# Patient Record
Sex: MALE | Race: WHITE | Employment: OTHER | URBAN - METROPOLITAN AREA
[De-identification: names, ages, dates, MRNs, and addresses within clinical notes are randomized per-mention and may not be internally consistent; named-entity substitution may affect disease eponyms.]

---

## 2017-01-05 ENCOUNTER — OFFICE VISIT (OUTPATIENT)
Dept: FAMILY MEDICINE CLINIC | Age: 67
End: 2017-01-05

## 2017-01-05 VITALS
HEIGHT: 68 IN | HEART RATE: 102 BPM | WEIGHT: 183 LBS | SYSTOLIC BLOOD PRESSURE: 116 MMHG | TEMPERATURE: 97.1 F | BODY MASS INDEX: 27.74 KG/M2 | DIASTOLIC BLOOD PRESSURE: 88 MMHG | OXYGEN SATURATION: 98 %

## 2017-01-05 DIAGNOSIS — F32.A DEPRESSION, UNSPECIFIED DEPRESSION TYPE: ICD-10-CM

## 2017-01-05 DIAGNOSIS — R53.83 OTHER FATIGUE: Primary | ICD-10-CM

## 2017-01-05 DIAGNOSIS — Z13.9 SCREENING: ICD-10-CM

## 2017-01-05 DIAGNOSIS — K50.812 CROHN'S DISEASE OF BOTH SMALL AND LARGE INTESTINE WITH INTESTINAL OBSTRUCTION (HCC): ICD-10-CM

## 2017-01-05 PROCEDURE — 99214 OFFICE O/P EST MOD 30 MIN: CPT | Performed by: FAMILY MEDICINE

## 2017-01-05 RX ORDER — SERTRALINE HYDROCHLORIDE 100 MG/1
100 TABLET, FILM COATED ORAL DAILY
Qty: 90 TABLET | Refills: 1 | Status: SHIPPED | OUTPATIENT
Start: 2017-01-05

## 2017-01-05 ASSESSMENT — ENCOUNTER SYMPTOMS
RESPIRATORY NEGATIVE: 1
ABDOMINAL PAIN: 1

## 2017-01-13 LAB
ALBUMIN SERPL-MCNC: 4.5 G/DL
ALP BLD-CCNC: 87 U/L
ALT SERPL-CCNC: 33 U/L
AST SERPL-CCNC: 43 U/L
BASOPHILS ABSOLUTE: 0 /ΜL
BASOPHILS RELATIVE PERCENT: 0.4 %
BILIRUB SERPL-MCNC: 0.5 MG/DL (ref 0.1–1.4)
BUN BLDV-MCNC: 17 MG/DL
CALCIUM SERPL-MCNC: 9 MG/DL
CHLORIDE BLD-SCNC: 103 MMOL/L
CHOLESTEROL, TOTAL: 198 MG/DL
CHOLESTEROL/HDL RATIO: NORMAL
CO2: 29 MMOL/L
CREAT SERPL-MCNC: 1.1 MG/DL
EOSINOPHILS ABSOLUTE: 0.2 /ΜL
EOSINOPHILS RELATIVE PERCENT: 3.1 %
GFR CALCULATED: 69
GLUCOSE BLD-MCNC: 89 MG/DL
HCT VFR BLD CALC: 44 % (ref 41–53)
HDLC SERPL-MCNC: 49 MG/DL (ref 35–70)
HEMOGLOBIN: 14.9 G/DL (ref 13.5–17.5)
LDL CHOLESTEROL CALCULATED: 84 MG/DL (ref 0–160)
LYMPHOCYTES ABSOLUTE: 1.8 /ΜL
LYMPHOCYTES RELATIVE PERCENT: 30.2 %
MCH RBC QN AUTO: 33.8 PG
MCHC RBC AUTO-ENTMCNC: 34 G/DL
MCV RBC AUTO: 99.6 FL
MONOCYTES ABSOLUTE: 0.6 /ΜL
MONOCYTES RELATIVE PERCENT: 9.4 %
NEUTROPHILS ABSOLUTE: 3.4 /ΜL
NEUTROPHILS RELATIVE PERCENT: 56.9 %
PDW BLD-RTO: 14.4 %
PLATELET # BLD: 163 K/ΜL
PMV BLD AUTO: NORMAL FL
POTASSIUM SERPL-SCNC: 4.6 MMOL/L
RBC # BLD: 4.42 10^6/ΜL
SODIUM BLD-SCNC: 142 MMOL/L
T3 TOTAL: 94
T4 TOTAL: 6.9
TOTAL PROTEIN: 6.8
TRIGL SERPL-MCNC: 325 MG/DL
TSH SERPL DL<=0.05 MIU/L-ACNC: 2.6 UIU/ML
VLDLC SERPL CALC-MCNC: 65 MG/DL
WBC # BLD: 6 10^3/ML

## 2017-01-24 DIAGNOSIS — Z13.9 SCREENING: ICD-10-CM

## 2017-01-24 DIAGNOSIS — K50.812 CROHN'S DISEASE OF BOTH SMALL AND LARGE INTESTINE WITH INTESTINAL OBSTRUCTION (HCC): ICD-10-CM

## 2017-01-24 DIAGNOSIS — R53.83 OTHER FATIGUE: ICD-10-CM

## 2017-02-16 ENCOUNTER — HOSPITAL ENCOUNTER (OUTPATIENT)
Dept: INFUSION THERAPY | Age: 67
Discharge: HOME | End: 2017-02-16
Attending: SPECIALIST | Admitting: SPECIALIST

## 2017-02-16 VITALS
TEMPERATURE: 98.8 F | DIASTOLIC BLOOD PRESSURE: 93 MMHG | HEART RATE: 82 BPM | HEIGHT: 68 IN | BODY MASS INDEX: 28.04 KG/M2 | SYSTOLIC BLOOD PRESSURE: 135 MMHG | WEIGHT: 185 LBS | RESPIRATION RATE: 16 BRPM

## 2017-02-16 DIAGNOSIS — K50.814 CROHN'S DISEASE OF BOTH SMALL AND LARGE INTESTINE WITH ABSCESS (HCC): ICD-10-CM

## 2017-02-16 RX ORDER — 0.9 % SODIUM CHLORIDE 0.9 %
10 VIAL (ML) INJECTION PRN
Status: CANCELLED
Start: 2017-02-16

## 2017-02-16 RX ORDER — 0.9 % SODIUM CHLORIDE 0.9 %
10 VIAL (ML) INJECTION PRN
Status: ACTIVE | OUTPATIENT
Start: 2017-02-16 | End: 2017-02-16

## 2017-02-16 RX ADMIN — Medication 10 ML: at 10:05

## 2017-02-16 RX ADMIN — Medication 10 ML: at 11:40

## 2017-04-13 ENCOUNTER — HOSPITAL ENCOUNTER (OUTPATIENT)
Dept: INFUSION THERAPY | Age: 67
Discharge: HOME | End: 2017-04-13
Attending: SPECIALIST | Admitting: SPECIALIST

## 2017-04-13 VITALS — HEART RATE: 76 BPM | RESPIRATION RATE: 18 BRPM | SYSTOLIC BLOOD PRESSURE: 145 MMHG | DIASTOLIC BLOOD PRESSURE: 86 MMHG

## 2017-04-13 DIAGNOSIS — K50.814 CROHN'S DISEASE OF BOTH SMALL AND LARGE INTESTINE WITH ABSCESS (HCC): ICD-10-CM

## 2017-04-13 RX ORDER — 0.9 % SODIUM CHLORIDE 0.9 %
10 VIAL (ML) INJECTION PRN
Status: DISCONTINUED | OUTPATIENT
Start: 2017-04-13 | End: 2017-04-14 | Stop reason: HOSPADM

## 2017-04-13 RX ORDER — 0.9 % SODIUM CHLORIDE 0.9 %
10 VIAL (ML) INJECTION PRN
Status: CANCELLED
Start: 2017-04-13

## 2017-04-13 RX ADMIN — Medication 10 ML: at 12:06

## 2017-04-13 RX ADMIN — Medication 10 ML: at 11:25

## 2017-06-08 ENCOUNTER — HOSPITAL ENCOUNTER (OUTPATIENT)
Dept: INFUSION THERAPY | Age: 67
Discharge: HOME | End: 2017-06-08
Attending: SPECIALIST | Admitting: SPECIALIST

## 2017-06-08 VITALS
HEART RATE: 75 BPM | RESPIRATION RATE: 18 BRPM | DIASTOLIC BLOOD PRESSURE: 82 MMHG | OXYGEN SATURATION: 97 % | SYSTOLIC BLOOD PRESSURE: 126 MMHG | TEMPERATURE: 98.1 F

## 2017-06-08 DIAGNOSIS — K50.814 CROHN'S DISEASE OF BOTH SMALL AND LARGE INTESTINE WITH ABSCESS (HCC): ICD-10-CM

## 2017-06-08 RX ORDER — 0.9 % SODIUM CHLORIDE 0.9 %
10 VIAL (ML) INJECTION PRN
Status: CANCELLED
Start: 2017-06-08

## 2017-06-08 RX ORDER — 0.9 % SODIUM CHLORIDE 0.9 %
10 VIAL (ML) INJECTION PRN
Status: DISCONTINUED | OUTPATIENT
Start: 2017-06-08 | End: 2017-06-09 | Stop reason: HOSPADM

## 2017-06-08 RX ADMIN — Medication 10 ML: at 10:58

## 2017-06-12 PROBLEM — K50.80 CROHN'S DISEASE OF BOTH SMALL AND LARGE INTESTINE WITHOUT COMPLICATION (HCC): Status: ACTIVE | Noted: 2017-06-12

## 2017-08-03 ENCOUNTER — HOSPITAL ENCOUNTER (OUTPATIENT)
Dept: INFUSION THERAPY | Age: 67
Discharge: HOME | End: 2017-08-03
Attending: SPECIALIST | Admitting: SPECIALIST

## 2017-08-03 VITALS
DIASTOLIC BLOOD PRESSURE: 78 MMHG | RESPIRATION RATE: 16 BRPM | TEMPERATURE: 98 F | HEART RATE: 72 BPM | SYSTOLIC BLOOD PRESSURE: 136 MMHG

## 2017-08-03 RX ORDER — SODIUM CHLORIDE 0.9 % (FLUSH) 0.9 %
10 SYRINGE (ML) INJECTION PRN
Status: DISCONTINUED | OUTPATIENT
Start: 2017-08-03 | End: 2017-08-04 | Stop reason: HOSPADM

## 2017-08-03 RX ADMIN — Medication 10 ML: at 11:09

## 2017-08-03 RX ADMIN — Medication 10 ML: at 10:28

## 2017-09-28 ENCOUNTER — HOSPITAL ENCOUNTER (OUTPATIENT)
Dept: INFUSION THERAPY | Age: 67
Discharge: HOME | End: 2017-09-28
Attending: SPECIALIST | Admitting: SPECIALIST

## 2017-09-28 VITALS — WEIGHT: 187 LBS | BODY MASS INDEX: 28.43 KG/M2

## 2017-09-28 RX ORDER — SODIUM CHLORIDE 0.9 % (FLUSH) 0.9 %
10 SYRINGE (ML) INJECTION PRN
Status: DISCONTINUED | OUTPATIENT
Start: 2017-09-28 | End: 2017-09-29 | Stop reason: HOSPADM

## 2017-09-28 RX ADMIN — Medication 10 ML: at 11:15

## 2017-09-28 RX ADMIN — Medication 10 ML: at 10:20

## 2017-09-28 ASSESSMENT — PAIN SCALES - GENERAL: PAINLEVEL_OUTOF10: 0

## 2017-11-22 ENCOUNTER — HOSPITAL ENCOUNTER (OUTPATIENT)
Dept: INFUSION THERAPY | Age: 67
Discharge: HOME | End: 2017-11-22
Attending: SPECIALIST | Admitting: SPECIALIST

## 2017-11-22 VITALS
HEIGHT: 67 IN | DIASTOLIC BLOOD PRESSURE: 79 MMHG | HEART RATE: 60 BPM | WEIGHT: 186 LBS | RESPIRATION RATE: 16 BRPM | SYSTOLIC BLOOD PRESSURE: 150 MMHG | BODY MASS INDEX: 29.19 KG/M2 | TEMPERATURE: 98.2 F

## 2017-11-22 RX ORDER — SODIUM CHLORIDE 0.9 % (FLUSH) 0.9 %
10 SYRINGE (ML) INJECTION PRN
Status: DISCONTINUED | OUTPATIENT
Start: 2017-11-22 | End: 2017-11-23 | Stop reason: HOSPADM

## 2017-11-22 RX ADMIN — Medication 10 ML: at 10:40

## 2017-11-22 RX ADMIN — Medication 10 ML: at 09:55

## 2017-11-22 NOTE — DISCHARGE SUMMARY
Tolerated Entyvio well. Discharge instructions explained written copy given. Understanding verbalized. Discharged home. Down to private auto per self.

## 2017-11-22 NOTE — PLAN OF CARE
Ambulatory to unit room 6 for Entyvio. Orientated to unit. Procedure and plan of care explained. Questions answered. Understanding verbalized.

## 2018-01-24 ENCOUNTER — HOSPITAL ENCOUNTER (OUTPATIENT)
Dept: INFUSION THERAPY | Age: 68
Discharge: HOME | End: 2018-01-24
Attending: SPECIALIST | Admitting: SPECIALIST

## 2018-01-24 VITALS
HEART RATE: 69 BPM | RESPIRATION RATE: 16 BRPM | SYSTOLIC BLOOD PRESSURE: 138 MMHG | TEMPERATURE: 98.5 F | DIASTOLIC BLOOD PRESSURE: 84 MMHG

## 2018-01-24 RX ORDER — SODIUM CHLORIDE 0.9 % (FLUSH) 0.9 %
10 SYRINGE (ML) INJECTION PRN
Status: DISCONTINUED | OUTPATIENT
Start: 2018-01-24 | End: 2018-01-25 | Stop reason: HOSPADM

## 2018-01-24 RX ADMIN — Medication 10 ML: at 11:05

## 2018-01-24 RX ADMIN — Medication 10 ML: at 10:10

## 2018-01-24 ASSESSMENT — PAIN SCALES - GENERAL: PAINLEVEL_OUTOF10: 0

## 2018-03-21 ENCOUNTER — HOSPITAL ENCOUNTER (OUTPATIENT)
Dept: INFUSION THERAPY | Age: 68
Discharge: HOME | End: 2018-03-21
Attending: SPECIALIST | Admitting: SPECIALIST

## 2018-03-21 VITALS
OXYGEN SATURATION: 97 % | WEIGHT: 189 LBS | BODY MASS INDEX: 29.66 KG/M2 | DIASTOLIC BLOOD PRESSURE: 83 MMHG | TEMPERATURE: 98.4 F | SYSTOLIC BLOOD PRESSURE: 136 MMHG | HEART RATE: 74 BPM | RESPIRATION RATE: 16 BRPM | HEIGHT: 67 IN

## 2018-03-21 RX ORDER — 0.9 % SODIUM CHLORIDE 0.9 %
10 VIAL (ML) INJECTION PRN
Status: DISCONTINUED | OUTPATIENT
Start: 2018-03-21 | End: 2018-03-22 | Stop reason: HOSPADM

## 2018-03-21 RX ADMIN — Medication 10 ML: at 10:40

## 2018-03-21 RX ADMIN — Medication 10 ML: at 09:55

## 2018-05-16 ENCOUNTER — HOSPITAL ENCOUNTER (OUTPATIENT)
Dept: INFUSION THERAPY | Age: 68
Discharge: HOME | End: 2018-05-16
Attending: SPECIALIST | Admitting: SPECIALIST

## 2018-05-16 VITALS
SYSTOLIC BLOOD PRESSURE: 134 MMHG | HEART RATE: 62 BPM | TEMPERATURE: 98 F | RESPIRATION RATE: 16 BRPM | DIASTOLIC BLOOD PRESSURE: 77 MMHG

## 2018-05-16 RX ORDER — SODIUM CHLORIDE 0.9 % (FLUSH) 0.9 %
10 SYRINGE (ML) INJECTION PRN
Status: DISCONTINUED | OUTPATIENT
Start: 2018-05-16 | End: 2018-05-17 | Stop reason: HOSPADM

## 2018-05-16 RX ADMIN — Medication 10 ML: at 09:55

## 2018-05-16 RX ADMIN — Medication 10 ML: at 10:55

## 2018-07-11 ENCOUNTER — HOSPITAL ENCOUNTER (OUTPATIENT)
Dept: INFUSION THERAPY | Age: 68
Discharge: HOME | End: 2018-07-11
Attending: SPECIALIST | Admitting: SPECIALIST

## 2018-07-11 VITALS
TEMPERATURE: 98.3 F | HEART RATE: 83 BPM | RESPIRATION RATE: 20 BRPM | SYSTOLIC BLOOD PRESSURE: 123 MMHG | DIASTOLIC BLOOD PRESSURE: 82 MMHG

## 2018-07-11 RX ORDER — SODIUM CHLORIDE 0.9 % (FLUSH) 0.9 %
10 SYRINGE (ML) INJECTION PRN
Status: DISCONTINUED | OUTPATIENT
Start: 2018-07-11 | End: 2018-07-12 | Stop reason: HOSPADM

## 2018-07-11 RX ADMIN — Medication 10 ML: at 11:20

## 2018-07-11 RX ADMIN — Medication 10 ML: at 10:00

## 2018-07-11 ASSESSMENT — PAIN SCALES - GENERAL: PAINLEVEL_OUTOF10: 0

## 2018-09-05 ENCOUNTER — HOSPITAL ENCOUNTER (OUTPATIENT)
Dept: INFUSION THERAPY | Age: 68
Discharge: HOME | End: 2018-09-05
Attending: SPECIALIST | Admitting: SPECIALIST

## 2018-09-05 VITALS
SYSTOLIC BLOOD PRESSURE: 134 MMHG | HEART RATE: 67 BPM | DIASTOLIC BLOOD PRESSURE: 87 MMHG | RESPIRATION RATE: 18 BRPM | TEMPERATURE: 97.4 F

## 2018-09-05 RX ORDER — SERTRALINE HYDROCHLORIDE 100 MG/1
100 TABLET, FILM COATED ORAL
COMMUNITY
End: 2018-09-05

## 2018-09-05 RX ORDER — FOLIC ACID 1 MG/1
1 TABLET ORAL
COMMUNITY
Start: 2016-05-02 | End: 2018-09-05

## 2018-09-05 RX ORDER — DIPHENOXYLATE HYDROCHLORIDE AND ATROPINE SULFATE 2.5; .025 MG/1; MG/1
2 TABLET ORAL
COMMUNITY
End: 2018-09-05

## 2018-09-05 ASSESSMENT — PAIN SCALES - GENERAL: PAINLEVEL_OUTOF10: 0

## 2018-09-06 NOTE — PROGRESS NOTES
Pt taken to room 00 for entyvio infusion. Pt oriented to room, call light, bed/chair controls, TV, pt voiced understanding. Plan of care explained to pt, pt voiced understanding.

## 2018-10-31 ENCOUNTER — HOSPITAL ENCOUNTER (OUTPATIENT)
Dept: INFUSION THERAPY | Age: 68
Setting detail: INFUSION SERIES
Discharge: HOME | End: 2018-10-31
Payer: MEDICARE

## 2018-10-31 VITALS
SYSTOLIC BLOOD PRESSURE: 150 MMHG | HEART RATE: 86 BPM | DIASTOLIC BLOOD PRESSURE: 86 MMHG | OXYGEN SATURATION: 98 % | TEMPERATURE: 98.9 F | RESPIRATION RATE: 16 BRPM

## 2018-10-31 PROCEDURE — 96365 THER/PROPH/DIAG IV INF INIT: CPT

## 2018-10-31 NOTE — PROGRESS NOTES
Pt taken to room 1 . Pt oriented to room, call light, bed/chair controls, TV, pt voiced understanding. Plan of care explained to pt, pt voiced understanding.      Pt information packet about Entyvio given to pt

## 2018-10-31 NOTE — PROGRESS NOTES
Reviewed discharge instructions with pt, voiced understanding. Pt ambulated to exit with steady gait.

## 2018-12-19 ENCOUNTER — HOSPITAL ENCOUNTER (OUTPATIENT)
Dept: INFUSION THERAPY | Age: 68
End: 2018-12-19

## 2018-12-27 ENCOUNTER — HOSPITAL ENCOUNTER (OUTPATIENT)
Dept: INFUSION THERAPY | Age: 68
Setting detail: INFUSION SERIES
Discharge: HOME | End: 2018-12-27
Payer: MEDICARE

## 2018-12-27 VITALS
WEIGHT: 175 LBS | BODY MASS INDEX: 27.47 KG/M2 | RESPIRATION RATE: 14 BRPM | TEMPERATURE: 98.4 F | DIASTOLIC BLOOD PRESSURE: 85 MMHG | HEIGHT: 67 IN | HEART RATE: 87 BPM | OXYGEN SATURATION: 96 % | SYSTOLIC BLOOD PRESSURE: 139 MMHG

## 2018-12-27 PROCEDURE — 96365 THER/PROPH/DIAG IV INF INIT: CPT

## 2018-12-27 PROCEDURE — 96360 HYDRATION IV INFUSION INIT: CPT

## 2018-12-27 PROCEDURE — 99211 OFF/OP EST MAY X REQ PHY/QHP: CPT

## 2018-12-27 RX ORDER — 0.9 % SODIUM CHLORIDE 0.9 %
10 VIAL (ML) INJECTION PRN
Status: DISCONTINUED | OUTPATIENT
Start: 2018-12-27 | End: 2018-12-28 | Stop reason: HOSPADM

## 2019-02-21 ENCOUNTER — HOSPITAL ENCOUNTER (OUTPATIENT)
Dept: INFUSION THERAPY | Age: 69
Setting detail: INFUSION SERIES
Discharge: HOME | End: 2019-02-21
Payer: MEDICARE

## 2019-02-21 VITALS
DIASTOLIC BLOOD PRESSURE: 89 MMHG | SYSTOLIC BLOOD PRESSURE: 126 MMHG | HEART RATE: 86 BPM | HEIGHT: 68 IN | WEIGHT: 172 LBS | TEMPERATURE: 98.6 F | OXYGEN SATURATION: 96 % | BODY MASS INDEX: 26.07 KG/M2 | RESPIRATION RATE: 15 BRPM

## 2019-02-21 PROCEDURE — 96365 THER/PROPH/DIAG IV INF INIT: CPT

## 2019-02-21 PROCEDURE — 2580000003 HC RX 258: Performed by: SPECIALIST

## 2019-02-21 PROCEDURE — 99211 OFF/OP EST MAY X REQ PHY/QHP: CPT

## 2019-02-21 RX ORDER — 0.9 % SODIUM CHLORIDE 0.9 %
10 VIAL (ML) INJECTION PRN
Status: DISCONTINUED | OUTPATIENT
Start: 2019-02-21 | End: 2019-02-22 | Stop reason: HOSPADM

## 2019-02-21 RX ADMIN — SODIUM CHLORIDE, PRESERVATIVE FREE 10 ML: 5 INJECTION INTRAVENOUS at 11:00

## 2019-02-21 NOTE — PLAN OF CARE
Ambulatory to unit room 1 for Entyvio. Reorientated to unit. Procedure and plan of care explained. Questions answered. Understanding verbalized.

## 2019-04-18 ENCOUNTER — HOSPITAL ENCOUNTER (OUTPATIENT)
Dept: INFUSION THERAPY | Age: 69
Setting detail: INFUSION SERIES
Discharge: HOME | End: 2019-04-18
Payer: MEDICARE

## 2019-04-18 VITALS
SYSTOLIC BLOOD PRESSURE: 129 MMHG | BODY MASS INDEX: 25.76 KG/M2 | HEART RATE: 72 BPM | TEMPERATURE: 97.1 F | HEIGHT: 68 IN | RESPIRATION RATE: 14 BRPM | WEIGHT: 170 LBS | DIASTOLIC BLOOD PRESSURE: 78 MMHG

## 2019-04-18 PROCEDURE — 2580000003 HC RX 258

## 2019-04-18 PROCEDURE — 99211 OFF/OP EST MAY X REQ PHY/QHP: CPT

## 2019-04-18 PROCEDURE — 2580000003 HC RX 258: Performed by: SPECIALIST

## 2019-04-18 PROCEDURE — 96365 THER/PROPH/DIAG IV INF INIT: CPT

## 2019-04-18 RX ORDER — 0.9 % SODIUM CHLORIDE 0.9 %
10 VIAL (ML) INJECTION PRN
Status: DISCONTINUED | OUTPATIENT
Start: 2019-04-18 | End: 2019-04-19 | Stop reason: HOSPADM

## 2019-04-18 RX ADMIN — SODIUM CHLORIDE, PRESERVATIVE FREE 10 ML: 5 INJECTION INTRAVENOUS at 10:30

## 2019-04-18 RX ADMIN — SODIUM CHLORIDE, PRESERVATIVE FREE 10 ML: 5 INJECTION INTRAVENOUS at 09:45

## 2019-04-18 NOTE — DISCHARGE SUMMARY
Tolerated Entyvio  well. Discharge instructions explained written copy given. Understanding verbalized. Discharged Down to private auto per self.

## 2019-05-14 ENCOUNTER — HOSPITAL ENCOUNTER (OUTPATIENT)
Dept: SURGERY | Age: 69
Discharge: HOME | End: 2019-05-14
Payer: MEDICARE

## 2019-05-14 ENCOUNTER — HOSPITAL ENCOUNTER (OUTPATIENT)
Dept: OPERATING ROOM | Age: 69
Setting detail: OUTPATIENT SURGERY
Discharge: HOME | End: 2019-05-15

## 2019-05-14 PROCEDURE — 91200 LIVER ELASTOGRAPHY: CPT

## 2019-05-17 NOTE — PROCEDURES
FIBROSCAN  REPORT     Patient Name: Samanta Davis  : 1950    Date: 19      MRN: [id number]    Physician: No att. providers found    Ordering Physician: Everrett Kehr  Date of exam: 19      Technician: wa                  Probe used:    [] M   [x] XL      Indication: (select suspected liver diagnosis)   [] NAFLD  (K76.0)  [] Chronic Hepatitis C (B18.2) [] Chronic Hepatitis B (B18.1)   [] Alcohol-related Liver Disease (K70.9)  [] PBC (K74.3)  [] Sclerosing Cholangitis (K83.0)   [] Other:______________________    Recent labs: AST 17 ALT 12 Bilirubin N  Alk. Phosphatase N  Platelet Count  405A  Date of labs:18        Procedure: After providing oral explanation of the Fibroscan VCTE test procedure to the patient, the patient was placed in the supine position with the right arm in maximum abduction to allow exposure of the right lateral abdomen. The patient was briefly assessed, identifying the terminus of the xiphoid process and locating an ideal testing site, mid-line and lateral to this point. The patient was instructed to breath normally and remain stationary during the testing process. Pre-measurement data confirmed that the probe was centered over the liver parenchyma. A series of at least ten 50 Hz mechanical pulses were applied with controlled application pressure to induce a mechanical shear wave in the liver tissue. For each measurement, the shear wave propagation speed was detected, displayed and converted to its equivalent liver stiffness value measured in kilopascals (kPa). Skin-to-liver capsule distance and shear wave characteristics were monitored during the entire exam to assure data quality. Median liver stiffness measurement and interquartile range were calculated and displayed in real time. Acquired measurement data was stored and submitted to the provider for review and interpretation.  The patient tolerated the procedure well and was discharged without incident. FINDINGS:  A. FIBROSIS ASSSESSMENT:   MEDIAN LIVER STIFFNESS SCORE____4.8___________kPa   INTERQUARTILE RANGE (IQR) /MEDIAN ____15_____%   INTERPRETATION: Taking into account the patient's history, this liver stiffness     score is consistent with:        [x]  NO OR MINIMAL FIBROSIS (F0-F1)                   []  MODERATE FIBROSIS (F2)                   []  ADVANCED / SIGNIFICANT FIBROSIS (F3)                   []  CIRRHOSIS (F4)  B. LIVER FAT ESTIMATION:       MEDIAN CAP (controlled attenuation parameter)_258________ dB/m  IRQ of  __53___ % INTERPRETATION: Taking into account the patient's history, this CAP score is     consistent with:        [] NO STEATOSIS (S0)        [x] MINIMAL-MILD STEATOSIS (S0-S1)        [] MILD- MODERATE STEATOSIS (S1-S2)          [] SIGNIFICANT STEATOSIS (S3)   COMMENTS: THE ABOVE FINDINGS SUGGEST MINIMAL TO MILD STEATOSIS. THE CALCULATED FIB-4 INDEX OF 2.01 SUGGESTS F2-F3 FIBROSIS HOWEVER THE FIBROSCAN SUGGESTS NO SIGNIFICANT FIBROSIS      SIGNATURE OF INTERPRETING PROVIDER: Electronically signed by Luz Delgado MD on 5/17/2019 at 10:17 AM    My interpretation of this Vibration Controlled Transient Elastogtaphy (VCTE) was developed after careful consideration of the patient's current medical evidence. Any and all Fibroscan studies must be carefully evaluated, taking fully into account all individual measurements/scans, patient history, and other factors. Any further medical or surgical intervention should be made only while fully considering the circumstances of this patient, in consultation with this patient, fully informed by the product characteristics of any drugs or treatment protocols.

## 2019-06-13 ENCOUNTER — HOSPITAL ENCOUNTER (OUTPATIENT)
Dept: INFUSION THERAPY | Age: 69
Setting detail: INFUSION SERIES
Discharge: HOME | End: 2019-06-13
Payer: MEDICARE

## 2019-06-13 VITALS
TEMPERATURE: 98.2 F | SYSTOLIC BLOOD PRESSURE: 138 MMHG | RESPIRATION RATE: 12 BRPM | DIASTOLIC BLOOD PRESSURE: 71 MMHG | HEART RATE: 70 BPM

## 2019-06-13 PROCEDURE — 99211 OFF/OP EST MAY X REQ PHY/QHP: CPT

## 2019-06-13 PROCEDURE — 2580000003 HC RX 258: Performed by: SPECIALIST

## 2019-06-13 PROCEDURE — 96365 THER/PROPH/DIAG IV INF INIT: CPT

## 2019-06-13 RX ORDER — 0.9 % SODIUM CHLORIDE 0.9 %
10 VIAL (ML) INJECTION PRN
Status: DISCONTINUED | OUTPATIENT
Start: 2019-06-13 | End: 2019-06-14 | Stop reason: HOSPADM

## 2019-06-13 RX ADMIN — SODIUM CHLORIDE, PRESERVATIVE FREE 10 ML: 5 INJECTION INTRAVENOUS at 10:05

## 2019-06-13 RX ADMIN — SODIUM CHLORIDE, PRESERVATIVE FREE 10 ML: 5 INJECTION INTRAVENOUS at 10:45

## 2019-06-13 NOTE — PLAN OF CARE
Ambulatory to unit room 6 for Entyvio. Reorientated to unit. Procedure and plan of care explained. Questions answered. Understanding verbalized.

## 2019-06-13 NOTE — DISCHARGE SUMMARY
Tolerated etyvio well. Discharge instructions explained written copy given. Understanding verbalized. Discharged home. Down to private auto per self.

## 2019-06-25 LAB
A/G RATIO: 1.8 (CALC) (ref 0.8–2.6)
ALBUMIN SERPL-MCNC: 4.5 GM/DL (ref 3.5–5.2)
ALP BLD-CCNC: 89 U/L (ref 23–144)
ALT SERPL-CCNC: 13 U/L (ref 0–60)
AST SERPL-CCNC: 19 U/L (ref 0–55)
BASOPHILS ABSOLUTE: 0 K/MM3 (ref 0–0.3)
BASOPHILS RELATIVE PERCENT: 0.7 % (ref 0–2)
BILIRUB SERPL-MCNC: 0.4 MG/DL (ref 0–1.2)
BUN / CREAT RATIO: 13 (CALC) (ref 7–25)
BUN BLDV-MCNC: 15 MG/DL (ref 3–29)
C-REACTIVE PROTEIN: <0.3 MG/DL
CALCIUM SERPL-MCNC: 9.8 MG/DL (ref 8.5–10.5)
CHLORIDE BLD-SCNC: 103 MEQ/L (ref 96–110)
CO2: 27 MEQ/L (ref 19–32)
CREAT SERPL-MCNC: 1.2 MG/DL
EOSINOPHILS ABSOLUTE: 0.2 K/MM3 (ref 0–0.6)
EOSINOPHILS RELATIVE PERCENT: 2.4 % (ref 0–7)
GFR SERPL CREATININE-BSD FRML MDRD: 61 ML/MIN/1.73M2
GLOBULIN: 2.5 GM/DL (CALC) (ref 1.9–3.6)
GLUCOSE BLD-MCNC: 81 MG/DL
HCT VFR BLD CALC: 44.3 % (ref 41–50)
HEMOGLOBIN: 15.7 G/DL (ref 13.8–17.2)
LEUKOCYTES, UA: 6.7 K/MM3 (ref 3.8–10.8)
LYMPHOCYTES ABSOLUTE: 1.9 K/MM3 (ref 0.9–4.1)
LYMPHOCYTES RELATIVE PERCENT: 29.1 % (ref 14–51)
MCH RBC QN AUTO: 34.5 PG (ref 27–33)
MCHC RBC AUTO-ENTMCNC: 35.4 G/DL (ref 32–36)
MCV RBC AUTO: 97.6 FL (ref 80–100)
MONOCYTES ABSOLUTE: 0.6 K/MM3 (ref 0.2–1.1)
MONOCYTES RELATIVE PERCENT: 9.2 % (ref 0–14)
NEUTROPHILS ABSOLUTE: 3.9 K/MM3 (ref 1.5–7.8)
PDW BLD-RTO: 12.8 % (ref 9–15)
PLATELET # BLD: 184 K/MM3 (ref 130–400)
POTASSIUM SERPL-SCNC: 4.7 MEQ/L (ref 3.4–5.3)
RBC # BLD: 4.54 M/MM3 (ref 4.4–5.8)
SEGMENTED NEUTROPHILS RELATIVE PERCENT: 58.6 % (ref 40–76)
SODIUM BLD-SCNC: 142 MEQ/L (ref 135–148)
TOTAL PROTEIN: 7 GM/DL (ref 6–8.3)

## 2019-06-26 LAB — COPY(IES) SENT TO:: NORMAL

## 2019-08-08 ENCOUNTER — HOSPITAL ENCOUNTER (OUTPATIENT)
Dept: INFUSION THERAPY | Age: 69
Setting detail: INFUSION SERIES
Discharge: HOME | End: 2019-08-08
Payer: MEDICARE

## 2019-08-08 VITALS
SYSTOLIC BLOOD PRESSURE: 130 MMHG | RESPIRATION RATE: 12 BRPM | HEIGHT: 68 IN | HEART RATE: 76 BPM | DIASTOLIC BLOOD PRESSURE: 77 MMHG | BODY MASS INDEX: 25.76 KG/M2 | TEMPERATURE: 98 F | WEIGHT: 170 LBS | OXYGEN SATURATION: 97 %

## 2019-08-08 PROCEDURE — 2580000003 HC RX 258: Performed by: SPECIALIST

## 2019-08-08 PROCEDURE — 96365 THER/PROPH/DIAG IV INF INIT: CPT

## 2019-08-08 PROCEDURE — 99211 OFF/OP EST MAY X REQ PHY/QHP: CPT

## 2019-08-08 RX ORDER — 0.9 % SODIUM CHLORIDE 0.9 %
10 VIAL (ML) INJECTION PRN
Status: DISCONTINUED | OUTPATIENT
Start: 2019-08-08 | End: 2019-08-09 | Stop reason: HOSPADM

## 2019-08-08 RX ADMIN — Medication 10 ML: at 10:50

## 2019-08-08 RX ADMIN — Medication 10 ML: at 10:00

## 2019-08-08 NOTE — PLAN OF CARE
Ambulatory to unit room 3 for Entyvio. Reorientated to unit. Procedure and plan of care explained. Questions answered. Understanding verbalized.

## 2019-08-13 ENCOUNTER — HOSPITAL ENCOUNTER (OUTPATIENT)
Age: 69
Discharge: HOME | End: 2019-08-13
Payer: MEDICARE

## 2019-08-13 LAB
C-REACTIVE PROTEIN, HIGH SENSITIVITY: 2.7 MG/L
HCT VFR BLD CALC: 42.7 % (ref 42–52)
HEMOGLOBIN: 14.1 GM/DL (ref 13.5–18)
MCH RBC QN AUTO: 33.6 PG (ref 27–31)
MCHC RBC AUTO-ENTMCNC: 33 % (ref 32–36)
MCV RBC AUTO: 101.7 FL (ref 78–100)
PDW BLD-RTO: 12.3 % (ref 11.7–14.9)
PLATELET # BLD: 167 K/CU MM (ref 140–440)
PMV BLD AUTO: 11.3 FL (ref 7.5–11.1)
RBC # BLD: 4.2 M/CU MM (ref 4.6–6.2)
WBC # BLD: 6.8 K/CU MM (ref 4–10.5)

## 2019-08-13 PROCEDURE — 83993 ASSAY FOR CALPROTECTIN FECAL: CPT

## 2019-08-13 PROCEDURE — 86140 C-REACTIVE PROTEIN: CPT

## 2019-08-13 PROCEDURE — 36415 COLL VENOUS BLD VENIPUNCTURE: CPT

## 2019-08-13 PROCEDURE — 85027 COMPLETE CBC AUTOMATED: CPT

## 2019-08-16 LAB
CALPROTECTIN, FECAL: 28 UG/G
CALPROTECTIN, FECAL: NORMAL UG/G

## 2019-10-03 ENCOUNTER — HOSPITAL ENCOUNTER (OUTPATIENT)
Dept: INFUSION THERAPY | Age: 69
Setting detail: INFUSION SERIES
Discharge: HOME | End: 2019-10-03
Payer: MEDICARE

## 2019-10-03 VITALS
DIASTOLIC BLOOD PRESSURE: 85 MMHG | SYSTOLIC BLOOD PRESSURE: 135 MMHG | HEART RATE: 71 BPM | TEMPERATURE: 98.3 F | OXYGEN SATURATION: 97 % | RESPIRATION RATE: 16 BRPM

## 2019-10-03 PROCEDURE — 96365 THER/PROPH/DIAG IV INF INIT: CPT

## 2019-10-03 PROCEDURE — 99211 OFF/OP EST MAY X REQ PHY/QHP: CPT

## 2019-10-03 PROCEDURE — 2580000003 HC RX 258: Performed by: SPECIALIST

## 2019-10-03 RX ORDER — SODIUM CHLORIDE 0.9 % (FLUSH) 0.9 %
10 SYRINGE (ML) INJECTION PRN
Status: DISCONTINUED | OUTPATIENT
Start: 2019-10-03 | End: 2019-10-04 | Stop reason: HOSPADM

## 2019-10-03 RX ADMIN — Medication 10 ML: at 10:55

## 2019-10-03 RX ADMIN — Medication 10 ML: at 09:32

## 2019-10-03 NOTE — DISCHARGE SUMMARY
Tolerated INFUSION WELL well. Discharge instructions explained written copy given. Understanding verbalized. Discharged home. Down to private auto per self.

## 2019-10-17 LAB
BASOPHILS ABSOLUTE: 0 K/MM3 (ref 0–0.3)
BASOPHILS RELATIVE PERCENT: 0.7 % (ref 0–2)
C-REACTIVE PROTEIN: 0.4 MG/DL
EOSINOPHILS ABSOLUTE: 0.2 K/MM3 (ref 0–0.6)
EOSINOPHILS RELATIVE PERCENT: 3.4 % (ref 0–7)
HCT VFR BLD CALC: 42.5 % (ref 41–50)
HEMOGLOBIN: 14.6 G/DL (ref 13.8–17.2)
LEUKOCYTES, UA: 6 K/MM3 (ref 3.8–10.8)
LYMPHOCYTES ABSOLUTE: 1.9 K/MM3 (ref 0.9–4.1)
LYMPHOCYTES RELATIVE PERCENT: 31.1 % (ref 14–51)
MCH RBC QN AUTO: 34.2 PG (ref 27–33)
MCHC RBC AUTO-ENTMCNC: 34.3 G/DL (ref 32–36)
MCV RBC AUTO: 99.5 FL (ref 80–100)
MONOCYTES ABSOLUTE: 0.7 K/MM3 (ref 0.2–1.1)
MONOCYTES RELATIVE PERCENT: 11.4 % (ref 0–14)
NEUTROPHILS ABSOLUTE: 3.2 K/MM3 (ref 1.5–7.8)
PDW BLD-RTO: 12.8 % (ref 9–15)
PLATELET # BLD: 181 K/MM3 (ref 130–400)
RBC # BLD: 4.27 M/MM3 (ref 4.4–5.8)
SEGMENTED NEUTROPHILS RELATIVE PERCENT: 53.4 % (ref 40–76)

## 2019-10-21 LAB
A/G RATIO: 1.7 (CALC) (ref 0.8–2.6)
ALBUMIN SERPL-MCNC: 4.3 GM/DL (ref 3.5–5.2)
ALP BLD-CCNC: 87 U/L (ref 23–144)
ALT SERPL-CCNC: 11 U/L (ref 0–60)
AST SERPL-CCNC: 24 (ref 0–55)
BILIRUB SERPL-MCNC: 0.3 MG/DL (ref 0–1.2)
BUN / CREAT RATIO: 13 (CALC) (ref 7–25)
BUN BLDV-MCNC: 16 MG/DL (ref 3–29)
CALCIUM SERPL-MCNC: 9.4 MG/DL (ref 8.5–10.5)
CHLORIDE BLD-SCNC: 107 MEQ/L (ref 96–110)
CO2: 25 MEQ/L (ref 19–32)
COPY(IES) SENT TO:: NORMAL
CREAT SERPL-MCNC: 1.2 MG/DL
GFR SERPL CREATININE-BSD FRML MDRD: 60 ML/MIN/1.73M2
GLOBULIN: 2.5 GM/DL (CALC) (ref 1.9–3.6)
GLUCOSE BLD-MCNC: 91 MG/DL
POTASSIUM SERPL-SCNC: 4.9 MEQ/L (ref 3.4–5.3)
SODIUM BLD-SCNC: 144 MEQ/L (ref 135–148)
TOTAL PROTEIN: 6.8 GM/DL (ref 6–8.3)

## 2019-11-27 ENCOUNTER — HOSPITAL ENCOUNTER (OUTPATIENT)
Dept: INFUSION THERAPY | Age: 69
Setting detail: INFUSION SERIES
Discharge: HOME | End: 2019-11-27
Payer: MEDICARE

## 2019-11-27 VITALS
HEART RATE: 68 BPM | TEMPERATURE: 98.9 F | DIASTOLIC BLOOD PRESSURE: 74 MMHG | RESPIRATION RATE: 14 BRPM | SYSTOLIC BLOOD PRESSURE: 117 MMHG | OXYGEN SATURATION: 96 %

## 2019-11-27 PROCEDURE — 99211 OFF/OP EST MAY X REQ PHY/QHP: CPT

## 2019-11-27 PROCEDURE — 96365 THER/PROPH/DIAG IV INF INIT: CPT

## 2019-11-27 NOTE — PROGRESS NOTES
Reviewed discharge instructions, voiced understanding, and copies of AVS given to take home. Pt tolerated infusion well. Pt to exit via steady gait.     Orders Placed This Encounter   Medications    DISCONTD: vedolizumab (ENTYVIO) 300 mg in sodium chloride 0.9 % 250 mL infusion    vedolizumab (ENTYVIO) 300 mg in sodium chloride 0.9 % 250 mL infusion

## 2020-01-22 ENCOUNTER — HOSPITAL ENCOUNTER (OUTPATIENT)
Dept: INFUSION THERAPY | Age: 70
Setting detail: INFUSION SERIES
Discharge: HOME | End: 2020-01-22
Payer: MEDICARE

## 2020-01-22 VITALS
RESPIRATION RATE: 16 BRPM | HEART RATE: 90 BPM | DIASTOLIC BLOOD PRESSURE: 80 MMHG | HEIGHT: 67 IN | SYSTOLIC BLOOD PRESSURE: 124 MMHG | TEMPERATURE: 98.2 F | WEIGHT: 172 LBS | OXYGEN SATURATION: 98 % | BODY MASS INDEX: 27 KG/M2

## 2020-01-22 PROCEDURE — 99211 OFF/OP EST MAY X REQ PHY/QHP: CPT

## 2020-01-22 PROCEDURE — 2580000003 HC RX 258: Performed by: SPECIALIST

## 2020-01-22 PROCEDURE — 96365 THER/PROPH/DIAG IV INF INIT: CPT

## 2020-01-22 RX ORDER — 0.9 % SODIUM CHLORIDE 0.9 %
10 VIAL (ML) INJECTION PRN
Status: DISCONTINUED | OUTPATIENT
Start: 2020-01-22 | End: 2020-01-23 | Stop reason: HOSPADM

## 2020-01-22 RX ADMIN — SODIUM CHLORIDE, PRESERVATIVE FREE 10 ML: 5 INJECTION INTRAVENOUS at 09:05

## 2020-01-22 RX ADMIN — SODIUM CHLORIDE, PRESERVATIVE FREE 10 ML: 5 INJECTION INTRAVENOUS at 10:00

## 2020-01-22 NOTE — PLAN OF CARE
Ambulatory to unit room  for [facility]. Reorientated to unit. Procedure and plan of care explained. Questions answered. Understanding verbalized.

## 2020-01-22 NOTE — DISCHARGE SUMMARY
Tolerated infusion  well. Reviewed discharge instructions, understanding verbalized. Copies of AVS given to take home. Patient discharged home. Down to exit per self.     Orders Placed This Encounter   Medications    vedolizumab (ENTYVIO) 300 mg in sodium chloride 0.9 % 250 mL infusion    sodium chloride (PF) 0.9 % injection 10 mL

## 2020-03-12 LAB
BASOPHILS ABSOLUTE: 0.1 K/MM3 (ref 0–0.3)
BASOPHILS RELATIVE PERCENT: 0.9 % (ref 0–2)
EOSINOPHILS ABSOLUTE: 0.2 K/MM3 (ref 0–0.6)
EOSINOPHILS RELATIVE PERCENT: 3.1 % (ref 0–7)
HCT VFR BLD CALC: 42 % (ref 41–50)
HEMOGLOBIN: 14.3 G/DL (ref 13.8–17.2)
LEUKOCYTES, UA: 5.9 K/MM3 (ref 3.8–10.8)
LYMPHOCYTES ABSOLUTE: 1.7 K/MM3 (ref 0.9–4.1)
LYMPHOCYTES RELATIVE PERCENT: 28.9 % (ref 14–51)
MCH RBC QN AUTO: 33.9 PG (ref 27–33)
MCHC RBC AUTO-ENTMCNC: 34.1 G/DL (ref 32–36)
MCV RBC AUTO: 99.4 FL (ref 80–100)
MONOCYTES ABSOLUTE: 0.5 K/MM3 (ref 0.2–1.1)
MONOCYTES RELATIVE PERCENT: 8.4 % (ref 0–14)
NEUTROPHILS ABSOLUTE: 3.5 K/MM3 (ref 1.5–7.8)
PDW BLD-RTO: 13.1 % (ref 9–15)
PLATELET # BLD: 180 K/MM3 (ref 130–400)
RBC # BLD: 4.23 M/MM3 (ref 4.4–5.8)
SEGMENTED NEUTROPHILS RELATIVE PERCENT: 58.7 % (ref 40–76)
VITAMIN B-12: 440 PG/ML (ref 200–1100)

## 2020-03-16 LAB
COPY(IES) SENT TO:: NORMAL
VITAMIN D 25-HYDROXY: 31 NG/ML
VITAMIN D2, 1,25 (OH)2: <4 NG/ML
VITAMIN D3, 1,25 (OH)2: 31 NG/ML

## 2020-03-18 ENCOUNTER — HOSPITAL ENCOUNTER (OUTPATIENT)
Dept: INFUSION THERAPY | Age: 70
Setting detail: INFUSION SERIES
Discharge: HOME | End: 2020-03-18
Payer: MEDICARE

## 2020-03-18 VITALS
TEMPERATURE: 97.6 F | SYSTOLIC BLOOD PRESSURE: 135 MMHG | RESPIRATION RATE: 16 BRPM | OXYGEN SATURATION: 97 % | HEART RATE: 82 BPM | DIASTOLIC BLOOD PRESSURE: 80 MMHG

## 2020-03-18 PROCEDURE — 99211 OFF/OP EST MAY X REQ PHY/QHP: CPT

## 2020-03-18 PROCEDURE — 96365 THER/PROPH/DIAG IV INF INIT: CPT

## 2020-03-18 PROCEDURE — 2580000003 HC RX 258: Performed by: SPECIALIST

## 2020-03-18 RX ORDER — 0.9 % SODIUM CHLORIDE 0.9 %
10 VIAL (ML) INJECTION PRN
Status: DISCONTINUED | OUTPATIENT
Start: 2020-03-18 | End: 2020-03-19 | Stop reason: HOSPADM

## 2020-03-18 RX ADMIN — SODIUM CHLORIDE, PRESERVATIVE FREE 10 ML: 5 INJECTION INTRAVENOUS at 10:13

## 2020-03-18 RX ADMIN — SODIUM CHLORIDE, PRESERVATIVE FREE 30 ML: 5 INJECTION INTRAVENOUS at 11:31

## 2020-03-18 NOTE — PROGRESS NOTES
Tolerated Entyvio infusion well. Reviewed discharge instruction, voiced understanding. Copies of AVS given. Pt discharged home. Pt to exit via ambulation.     Orders Placed This Encounter   Medications    sodium chloride (PF) 0.9 % injection 10 mL    vedolizumab (ENTYVIO) 300 mg in sodium chloride 0.9 % 250 mL infusion

## 2020-04-02 ENCOUNTER — TELEPHONE (OUTPATIENT)
Dept: INFUSION THERAPY | Age: 70
End: 2020-04-02

## 2020-05-12 ENCOUNTER — HOSPITAL ENCOUNTER (OUTPATIENT)
Dept: INFUSION THERAPY | Age: 70
Setting detail: INFUSION SERIES
Discharge: HOME | End: 2020-05-12
Payer: MEDICARE

## 2020-05-12 VITALS
TEMPERATURE: 97.2 F | SYSTOLIC BLOOD PRESSURE: 144 MMHG | DIASTOLIC BLOOD PRESSURE: 80 MMHG | HEART RATE: 88 BPM | OXYGEN SATURATION: 98 % | RESPIRATION RATE: 16 BRPM

## 2020-05-12 PROCEDURE — 96365 THER/PROPH/DIAG IV INF INIT: CPT

## 2020-05-12 PROCEDURE — 99211 OFF/OP EST MAY X REQ PHY/QHP: CPT

## 2020-05-12 PROCEDURE — 2580000003 HC RX 258: Performed by: SPECIALIST

## 2020-05-12 RX ORDER — SODIUM CHLORIDE 0.9 % (FLUSH) 0.9 %
10 SYRINGE (ML) INJECTION PRN
Status: DISCONTINUED | OUTPATIENT
Start: 2020-05-12 | End: 2020-05-13 | Stop reason: HOSPADM

## 2020-05-12 RX ADMIN — SODIUM CHLORIDE, PRESERVATIVE FREE 10 ML: 5 INJECTION INTRAVENOUS at 09:37

## 2020-05-12 RX ADMIN — SODIUM CHLORIDE, PRESERVATIVE FREE 30 ML: 5 INJECTION INTRAVENOUS at 10:53

## 2020-05-12 NOTE — PROGRESS NOTES
The following patient supplied medication(s) have been identified and labeled by the pharmacist for administration in the hospital.. Jeff Rizvi Entyvio injection.     Heather Carrero  5/12/2020 9:24 AM

## 2020-05-12 NOTE — PROGRESS NOTES
Tolerated infusion well. Reviewed discharge instruction, voiced understanding. Copies of AVS given. Pt discharged home. Pt to exit via steady gait.     Orders Placed This Encounter   Medications    vedolizumab (ENTYVIO) 300 mg in sodium chloride 0.9 % 250 mL infusion    sodium chloride flush 0.9 % injection 10 mL

## 2020-05-22 ENCOUNTER — HOSPITAL ENCOUNTER (OUTPATIENT)
Dept: MRI IMAGING | Age: 70
Discharge: HOME | End: 2020-05-22
Payer: MEDICARE

## 2020-05-22 LAB
GFR AFRICAN AMERICAN: >60 ML/MIN/1.73M2
GFR NON-AFRICAN AMERICAN: 57 ML/MIN/1.73M2
POC CREATININE: 1.3 MG/DL (ref 0.9–1.3)

## 2020-05-22 PROCEDURE — 74183 MRI ABD W/O CNTR FLWD CNTR: CPT

## 2020-05-22 PROCEDURE — A9577 INJ MULTIHANCE: HCPCS | Performed by: FAMILY MEDICINE

## 2020-05-22 PROCEDURE — 6360000004 HC RX CONTRAST MEDICATION: Performed by: FAMILY MEDICINE

## 2020-05-22 RX ADMIN — GADOBENATE DIMEGLUMINE 17 ML: 529 INJECTION, SOLUTION INTRAVENOUS at 11:50

## 2020-06-19 LAB
BASOPHILS ABSOLUTE: 0.1 K/MM3 (ref 0–0.3)
BASOPHILS RELATIVE PERCENT: 0.8 % (ref 0–2)
DIFFERENTIAL TYPE: ABNORMAL
EOSINOPHILS ABSOLUTE: 0.2 K/MM3 (ref 0–0.6)
EOSINOPHILS RELATIVE PERCENT: 3 % (ref 0–7)
HCT VFR BLD CALC: 43.8 % (ref 41–50)
HEMOGLOBIN: 15.5 G/DL (ref 13.8–17.2)
LYMPHOCYTES ABSOLUTE: 2 K/MM3 (ref 0.9–4.1)
LYMPHOCYTES RELATIVE PERCENT: 32.3 % (ref 14–51)
MCH RBC QN AUTO: 34.7 PG (ref 27–33)
MCHC RBC AUTO-ENTMCNC: 35.5 G/DL (ref 32–36)
MCV RBC AUTO: 97.7 FL (ref 80–100)
MONOCYTES ABSOLUTE: 0.5 K/MM3 (ref 0.2–1.1)
MONOCYTES RELATIVE PERCENT: 8.2 % (ref 0–14)
NEUTROPHILS ABSOLUTE: 3.4 K/MM3 (ref 1.5–7.8)
NEUTROPHILS RELATIVE PERCENT: 55.7 % (ref 40–76)
PDW BLD-RTO: 13.1 % (ref 9–15)
PLATELET # BLD: 205 K/MM3 (ref 130–400)
RBC # BLD: 4.48 M/MM3 (ref 4.4–5.8)
WBC: 6.1 K/MM3 (ref 3.8–10.8)

## 2020-07-07 ENCOUNTER — HOSPITAL ENCOUNTER (OUTPATIENT)
Dept: INFUSION THERAPY | Age: 70
Setting detail: INFUSION SERIES
Discharge: HOME | End: 2020-07-07
Payer: MEDICARE

## 2020-07-07 VITALS
TEMPERATURE: 97.5 F | HEART RATE: 74 BPM | DIASTOLIC BLOOD PRESSURE: 84 MMHG | OXYGEN SATURATION: 97 % | RESPIRATION RATE: 14 BRPM | SYSTOLIC BLOOD PRESSURE: 132 MMHG

## 2020-07-07 PROCEDURE — 99211 OFF/OP EST MAY X REQ PHY/QHP: CPT

## 2020-07-07 PROCEDURE — 96365 THER/PROPH/DIAG IV INF INIT: CPT

## 2020-08-27 DIAGNOSIS — K50.90 CROHN'S DISEASE WITHOUT COMPLICATION, UNSPECIFIED GASTROINTESTINAL TRACT LOCATION (HCC): ICD-10-CM

## 2020-08-27 RX ORDER — SODIUM CHLORIDE 0.9 % (FLUSH) 0.9 %
10 SYRINGE (ML) INJECTION PRN
Status: CANCELLED | OUTPATIENT
Start: 2020-09-01

## 2020-08-27 RX ORDER — SODIUM CHLORIDE 0.9 % (FLUSH) 0.9 %
30 SYRINGE (ML) INJECTION ONCE
Status: CANCELLED | OUTPATIENT
Start: 2020-09-01

## 2020-09-01 ENCOUNTER — HOSPITAL ENCOUNTER (OUTPATIENT)
Dept: INFUSION THERAPY | Age: 70
Setting detail: INFUSION SERIES
Discharge: HOME | End: 2020-09-01
Payer: MEDICARE

## 2020-09-01 VITALS
HEART RATE: 82 BPM | SYSTOLIC BLOOD PRESSURE: 127 MMHG | OXYGEN SATURATION: 99 % | RESPIRATION RATE: 16 BRPM | TEMPERATURE: 97.2 F | DIASTOLIC BLOOD PRESSURE: 78 MMHG

## 2020-09-01 DIAGNOSIS — K50.90 CROHN'S DISEASE WITHOUT COMPLICATION, UNSPECIFIED GASTROINTESTINAL TRACT LOCATION (HCC): Primary | ICD-10-CM

## 2020-09-01 PROCEDURE — 99211 OFF/OP EST MAY X REQ PHY/QHP: CPT

## 2020-09-01 PROCEDURE — 2580000003 HC RX 258: Performed by: SPECIALIST

## 2020-09-01 PROCEDURE — 6360000002 HC RX W HCPCS: Performed by: SPECIALIST

## 2020-09-01 PROCEDURE — 96365 THER/PROPH/DIAG IV INF INIT: CPT

## 2020-09-01 RX ORDER — SODIUM CHLORIDE 0.9 % (FLUSH) 0.9 %
10 SYRINGE (ML) INJECTION PRN
Status: CANCELLED | OUTPATIENT
Start: 2020-10-27

## 2020-09-01 RX ORDER — SODIUM CHLORIDE 0.9 % (FLUSH) 0.9 %
30 SYRINGE (ML) INJECTION ONCE
Status: CANCELLED | OUTPATIENT
Start: 2020-10-27

## 2020-09-01 RX ORDER — SODIUM CHLORIDE 0.9 % (FLUSH) 0.9 %
10 SYRINGE (ML) INJECTION PRN
Status: DISCONTINUED | OUTPATIENT
Start: 2020-09-01 | End: 2020-09-02 | Stop reason: HOSPADM

## 2020-09-01 RX ORDER — SODIUM CHLORIDE 0.9 % (FLUSH) 0.9 %
30 SYRINGE (ML) INJECTION ONCE
Status: COMPLETED | OUTPATIENT
Start: 2020-09-01 | End: 2020-09-01

## 2020-09-01 RX ADMIN — VEDOLIZUMAB 300 MG: 300 INJECTION, POWDER, LYOPHILIZED, FOR SOLUTION INTRAVENOUS at 09:58

## 2020-09-01 RX ADMIN — SODIUM CHLORIDE, PRESERVATIVE FREE 30 ML: 5 INJECTION INTRAVENOUS at 10:34

## 2020-09-01 NOTE — PROGRESS NOTES
Tolerated infusion well. Reviewed discharge instruction, voiced understanding. Copies of AVS given. Pt discharged home. Pt to exit via ambulation.     Orders Placed This Encounter   Medications    vedolizumab (ENTYVIO) 300 mg in sodium chloride 0.9 % 250 mL infusion    sodium chloride flush 0.9 % injection 30 mL    sodium chloride flush 0.9 % injection 10 mL

## 2020-09-24 LAB
BASOPHILS ABSOLUTE: 0 K/MM3 (ref 0–0.3)
BASOPHILS RELATIVE PERCENT: 0.7 % (ref 0–2)
DIFFERENTIAL TYPE: ABNORMAL
EOSINOPHILS ABSOLUTE: 0.1 K/MM3 (ref 0–0.6)
EOSINOPHILS RELATIVE PERCENT: 2.4 % (ref 0–7)
HCT VFR BLD CALC: 42.5 % (ref 41–50)
HEMOGLOBIN: 14.9 G/DL (ref 13.8–17.2)
LYMPHOCYTES ABSOLUTE: 1.9 K/MM3 (ref 0.9–4.1)
LYMPHOCYTES RELATIVE PERCENT: 29.9 % (ref 14–51)
MCH RBC QN AUTO: 34.5 PG (ref 27–33)
MCHC RBC AUTO-ENTMCNC: 35 G/DL (ref 32–36)
MCV RBC AUTO: 98.8 FL (ref 80–100)
MONOCYTES ABSOLUTE: 0.6 K/MM3 (ref 0.2–1.1)
MONOCYTES RELATIVE PERCENT: 10.3 % (ref 0–14)
NEUTROPHILS ABSOLUTE: 3.5 K/MM3 (ref 1.5–7.8)
NEUTROPHILS RELATIVE PERCENT: 56.7 % (ref 40–76)
PDW BLD-RTO: 12.8 % (ref 9–15)
PLATELET # BLD: 186 K/MM3 (ref 130–400)
RBC # BLD: 4.3 M/MM3 (ref 4.4–5.8)
WBC: 6.2 K/MM3 (ref 3.8–10.8)

## 2020-10-27 ENCOUNTER — HOSPITAL ENCOUNTER (OUTPATIENT)
Dept: INFUSION THERAPY | Age: 70
Setting detail: INFUSION SERIES
Discharge: HOME | End: 2020-10-27
Payer: MEDICARE

## 2020-10-27 VITALS
OXYGEN SATURATION: 96 % | RESPIRATION RATE: 16 BRPM | SYSTOLIC BLOOD PRESSURE: 131 MMHG | DIASTOLIC BLOOD PRESSURE: 82 MMHG | TEMPERATURE: 97.5 F | HEART RATE: 88 BPM

## 2020-10-27 DIAGNOSIS — K50.90 CROHN'S DISEASE WITHOUT COMPLICATION, UNSPECIFIED GASTROINTESTINAL TRACT LOCATION (HCC): Primary | ICD-10-CM

## 2020-10-27 PROCEDURE — 96365 THER/PROPH/DIAG IV INF INIT: CPT

## 2020-10-27 PROCEDURE — 6360000002 HC RX W HCPCS: Performed by: SPECIALIST

## 2020-10-27 PROCEDURE — 2580000003 HC RX 258: Performed by: SPECIALIST

## 2020-10-27 PROCEDURE — 99211 OFF/OP EST MAY X REQ PHY/QHP: CPT

## 2020-10-27 RX ORDER — SODIUM CHLORIDE 0.9 % (FLUSH) 0.9 %
10 SYRINGE (ML) INJECTION PRN
Status: CANCELLED | OUTPATIENT
Start: 2020-12-22

## 2020-10-27 RX ORDER — SODIUM CHLORIDE 0.9 % (FLUSH) 0.9 %
30 SYRINGE (ML) INJECTION ONCE
Status: COMPLETED | OUTPATIENT
Start: 2020-10-27 | End: 2020-10-27

## 2020-10-27 RX ORDER — SODIUM CHLORIDE 0.9 % (FLUSH) 0.9 %
30 SYRINGE (ML) INJECTION ONCE
Status: CANCELLED | OUTPATIENT
Start: 2020-12-22

## 2020-10-27 RX ORDER — SODIUM CHLORIDE 0.9 % (FLUSH) 0.9 %
10 SYRINGE (ML) INJECTION PRN
Status: DISCONTINUED | OUTPATIENT
Start: 2020-10-27 | End: 2020-10-28 | Stop reason: HOSPADM

## 2020-10-27 RX ADMIN — VEDOLIZUMAB 300 MG: 300 INJECTION, POWDER, LYOPHILIZED, FOR SOLUTION INTRAVENOUS at 10:35

## 2020-10-27 RX ADMIN — SODIUM CHLORIDE, PRESERVATIVE FREE 10 ML: 5 INJECTION INTRAVENOUS at 09:45

## 2020-10-27 RX ADMIN — SODIUM CHLORIDE, PRESERVATIVE FREE 30 ML: 5 INJECTION INTRAVENOUS at 11:05

## 2020-10-27 NOTE — DISCHARGE SUMMARY
Tolerated Entyvio well. Reviewed discharge instructions, understanding verbalized. Copies of AVS given to take home. Patient discharged home. Down to exit per self.     Orders Placed This Encounter   Medications    vedolizumab (ENTYVIO) 300 mg in sodium chloride 0.9 % 250 mL infusion    sodium chloride flush 0.9 % injection 30 mL    sodium chloride flush 0.9 % injection 10 mL

## 2020-12-22 ENCOUNTER — HOSPITAL ENCOUNTER (OUTPATIENT)
Dept: INFUSION THERAPY | Age: 70
Setting detail: INFUSION SERIES
Discharge: HOME | End: 2020-12-22
Payer: MEDICARE

## 2020-12-22 VITALS
DIASTOLIC BLOOD PRESSURE: 82 MMHG | TEMPERATURE: 97.3 F | RESPIRATION RATE: 16 BRPM | OXYGEN SATURATION: 96 % | HEART RATE: 92 BPM | SYSTOLIC BLOOD PRESSURE: 136 MMHG

## 2020-12-22 DIAGNOSIS — K50.90 CROHN'S DISEASE WITHOUT COMPLICATION, UNSPECIFIED GASTROINTESTINAL TRACT LOCATION (HCC): Primary | ICD-10-CM

## 2020-12-22 PROCEDURE — 6360000002 HC RX W HCPCS: Performed by: SPECIALIST

## 2020-12-22 PROCEDURE — 96365 THER/PROPH/DIAG IV INF INIT: CPT

## 2020-12-22 PROCEDURE — 99211 OFF/OP EST MAY X REQ PHY/QHP: CPT

## 2020-12-22 PROCEDURE — 2580000003 HC RX 258: Performed by: SPECIALIST

## 2020-12-22 RX ORDER — SODIUM CHLORIDE 0.9 % (FLUSH) 0.9 %
30 SYRINGE (ML) INJECTION ONCE
Status: COMPLETED | OUTPATIENT
Start: 2020-12-22 | End: 2020-12-22

## 2020-12-22 RX ORDER — SODIUM CHLORIDE 0.9 % (FLUSH) 0.9 %
30 SYRINGE (ML) INJECTION ONCE
Status: CANCELLED | OUTPATIENT
Start: 2021-02-16

## 2020-12-22 RX ORDER — SODIUM CHLORIDE 0.9 % (FLUSH) 0.9 %
10 SYRINGE (ML) INJECTION PRN
Status: CANCELLED | OUTPATIENT
Start: 2021-02-16

## 2020-12-22 RX ORDER — SODIUM CHLORIDE 0.9 % (FLUSH) 0.9 %
10 SYRINGE (ML) INJECTION PRN
Status: DISCONTINUED | OUTPATIENT
Start: 2020-12-22 | End: 2020-12-23 | Stop reason: HOSPADM

## 2020-12-22 RX ADMIN — SODIUM CHLORIDE, PRESERVATIVE FREE 30 ML: 5 INJECTION INTRAVENOUS at 09:45

## 2020-12-22 RX ADMIN — SODIUM CHLORIDE, PRESERVATIVE FREE 10 ML: 5 INJECTION INTRAVENOUS at 09:05

## 2020-12-22 RX ADMIN — VEDOLIZUMAB 300 MG: 300 INJECTION, POWDER, LYOPHILIZED, FOR SOLUTION INTRAVENOUS at 09:10

## 2020-12-30 RX ORDER — VEDOLIZUMAB 300 MG/5ML
INJECTION, POWDER, LYOPHILIZED, FOR SOLUTION INTRAVENOUS
Qty: 300 MG | Refills: 3 | Status: SHIPPED | OUTPATIENT
Start: 2020-12-30 | End: 2021-09-21 | Stop reason: SDUPTHER

## 2021-01-04 DIAGNOSIS — K50.819 CROHN'S DISEASE OF SMALL AND LARGE INTESTINES WITH COMPLICATION (HCC): ICD-10-CM

## 2021-01-04 RX ORDER — SODIUM CHLORIDE 0.9 % (FLUSH) 0.9 %
10 SYRINGE (ML) INJECTION PRN
Status: CANCELLED | OUTPATIENT
Start: 2021-02-16

## 2021-01-04 RX ORDER — SODIUM CHLORIDE 0.9 % (FLUSH) 0.9 %
30 SYRINGE (ML) INJECTION ONCE
Status: CANCELLED | OUTPATIENT
Start: 2021-02-16

## 2021-01-05 PROBLEM — K50.819 CROHN'S DISEASE OF SMALL AND LARGE INTESTINES WITH COMPLICATION (HCC): Status: ACTIVE | Noted: 2021-01-05

## 2021-02-16 ENCOUNTER — HOSPITAL ENCOUNTER (OUTPATIENT)
Dept: INFUSION THERAPY | Age: 71
Setting detail: INFUSION SERIES
Discharge: HOME | End: 2021-02-16
Payer: MEDICARE

## 2021-02-16 VITALS
HEART RATE: 86 BPM | DIASTOLIC BLOOD PRESSURE: 87 MMHG | OXYGEN SATURATION: 96 % | SYSTOLIC BLOOD PRESSURE: 138 MMHG | TEMPERATURE: 97.3 F | RESPIRATION RATE: 16 BRPM

## 2021-02-16 DIAGNOSIS — K50.80 CROHN'S DISEASE OF BOTH SMALL AND LARGE INTESTINE WITHOUT COMPLICATION (HCC): Primary | ICD-10-CM

## 2021-02-16 PROCEDURE — 99211 OFF/OP EST MAY X REQ PHY/QHP: CPT

## 2021-02-16 PROCEDURE — 96365 THER/PROPH/DIAG IV INF INIT: CPT

## 2021-02-16 PROCEDURE — 2580000003 HC RX 258: Performed by: SPECIALIST

## 2021-02-16 RX ORDER — SODIUM CHLORIDE 0.9 % (FLUSH) 0.9 %
30 SYRINGE (ML) INJECTION ONCE
Status: COMPLETED | OUTPATIENT
Start: 2021-02-16 | End: 2021-02-16

## 2021-02-16 RX ORDER — SODIUM CHLORIDE 0.9 % (FLUSH) 0.9 %
10 SYRINGE (ML) INJECTION PRN
Status: DISCONTINUED | OUTPATIENT
Start: 2021-02-16 | End: 2021-02-17 | Stop reason: HOSPADM

## 2021-02-16 RX ORDER — SODIUM CHLORIDE 0.9 % (FLUSH) 0.9 %
30 SYRINGE (ML) INJECTION ONCE
Status: CANCELLED | OUTPATIENT
Start: 2021-04-13

## 2021-02-16 RX ORDER — SODIUM CHLORIDE 0.9 % (FLUSH) 0.9 %
10 SYRINGE (ML) INJECTION PRN
Status: CANCELLED | OUTPATIENT
Start: 2021-04-13

## 2021-02-16 RX ADMIN — SODIUM CHLORIDE, PRESERVATIVE FREE 30 ML: 5 INJECTION INTRAVENOUS at 10:25

## 2021-02-16 NOTE — DISCHARGE SUMMARY
Tolerated Entyvio* well. Reviewed discharge instructions, understanding verbalized. Copies of AVS given to take home. Patient discharged home. Down to exit per self.     Orders Placed This Encounter   Medications    vedolizumab (ENTYVIO) 300 mg in sodium chloride 0.9 % 250 mL infusion    sodium chloride flush 0.9 % injection 30 mL    sodium chloride flush 0.9 % injection 10 mL

## 2021-04-13 ENCOUNTER — HOSPITAL ENCOUNTER (OUTPATIENT)
Dept: INFUSION THERAPY | Age: 71
Setting detail: INFUSION SERIES
Discharge: HOME | End: 2021-04-13
Payer: MEDICARE

## 2021-04-13 VITALS
RESPIRATION RATE: 16 BRPM | SYSTOLIC BLOOD PRESSURE: 129 MMHG | HEART RATE: 71 BPM | TEMPERATURE: 97.3 F | DIASTOLIC BLOOD PRESSURE: 83 MMHG | OXYGEN SATURATION: 97 %

## 2021-04-13 DIAGNOSIS — K50.80 CROHN'S DISEASE OF BOTH SMALL AND LARGE INTESTINE WITHOUT COMPLICATION (HCC): Primary | ICD-10-CM

## 2021-04-13 PROCEDURE — 96365 THER/PROPH/DIAG IV INF INIT: CPT

## 2021-04-13 PROCEDURE — 2580000003 HC RX 258: Performed by: SPECIALIST

## 2021-04-13 PROCEDURE — 99211 OFF/OP EST MAY X REQ PHY/QHP: CPT

## 2021-04-13 RX ORDER — SODIUM CHLORIDE 0.9 % (FLUSH) 0.9 %
30 SYRINGE (ML) INJECTION ONCE
Status: CANCELLED | OUTPATIENT
Start: 2021-06-08

## 2021-04-13 RX ORDER — SODIUM CHLORIDE 0.9 % (FLUSH) 0.9 %
10 SYRINGE (ML) INJECTION PRN
Status: DISCONTINUED | OUTPATIENT
Start: 2021-04-13 | End: 2021-04-14 | Stop reason: HOSPADM

## 2021-04-13 RX ORDER — SODIUM CHLORIDE 0.9 % (FLUSH) 0.9 %
30 SYRINGE (ML) INJECTION ONCE
Status: COMPLETED | OUTPATIENT
Start: 2021-04-13 | End: 2021-04-13

## 2021-04-13 RX ORDER — SODIUM CHLORIDE 0.9 % (FLUSH) 0.9 %
10 SYRINGE (ML) INJECTION PRN
Status: CANCELLED | OUTPATIENT
Start: 2021-06-08

## 2021-04-13 RX ADMIN — SODIUM CHLORIDE, PRESERVATIVE FREE 30 ML: 5 INJECTION INTRAVENOUS at 10:52

## 2021-04-13 RX ADMIN — SODIUM CHLORIDE, PRESERVATIVE FREE 10 ML: 5 INJECTION INTRAVENOUS at 10:07

## 2021-04-13 NOTE — PROGRESS NOTES
Tolerated Entyvio infusion well. Reviewed discharge instruction, voiced understanding. Copies of AVS given. Pt discharged home. Pt to exit via ambulation.     Orders Placed This Encounter   Medications    vedolizumab (ENTYVIO) 300 mg in sodium chloride 0.9 % 250 mL infusion    sodium chloride flush 0.9 % injection 30 mL    sodium chloride flush 0.9 % injection 10 mL

## 2021-04-22 LAB
BASOPHILS ABSOLUTE: 0 K/MM3 (ref 0–0.3)
BASOPHILS RELATIVE PERCENT: 1 % (ref 0–2)
DIFFERENTIAL TYPE: ABNORMAL
EOSINOPHILS ABSOLUTE: 0.1 K/MM3 (ref 0–0.6)
EOSINOPHILS RELATIVE PERCENT: 3 % (ref 0–7)
HCT VFR BLD CALC: 41 % (ref 41–50)
HEMOGLOBIN: 13.8 G/DL (ref 13.8–17.2)
LYMPHOCYTES ABSOLUTE: 1.4 K/MM3 (ref 0.9–4.1)
LYMPHOCYTES RELATIVE PERCENT: 29.8 % (ref 14–51)
MCH RBC QN AUTO: 32.8 PG (ref 27–33)
MCHC RBC AUTO-ENTMCNC: 33.8 G/DL (ref 32–36)
MCV RBC AUTO: 97.2 FL (ref 80–100)
MONOCYTES ABSOLUTE: 0.5 K/MM3 (ref 0.2–1.1)
MONOCYTES RELATIVE PERCENT: 10.9 % (ref 0–14)
NEUTROPHILS ABSOLUTE: 2.5 K/MM3 (ref 1.5–7.8)
NEUTROPHILS RELATIVE PERCENT: 55.3 % (ref 40–76)
PDW BLD-RTO: 12.6 % (ref 9–15)
PLATELET # BLD: 184 K/MM3 (ref 130–400)
RBC # BLD: 4.22 M/MM3 (ref 4.4–5.8)
WBC: 4.6 K/MM3 (ref 3.8–10.8)

## 2021-04-26 ENCOUNTER — OFFICE VISIT (OUTPATIENT)
Dept: GASTROENTEROLOGY | Age: 71
End: 2021-04-26
Payer: MEDICARE

## 2021-04-26 VITALS
OXYGEN SATURATION: 96 % | HEIGHT: 67 IN | DIASTOLIC BLOOD PRESSURE: 78 MMHG | TEMPERATURE: 97.7 F | WEIGHT: 184 LBS | BODY MASS INDEX: 28.88 KG/M2 | SYSTOLIC BLOOD PRESSURE: 122 MMHG | HEART RATE: 91 BPM

## 2021-04-26 DIAGNOSIS — K50.80 CROHN'S DISEASE OF BOTH SMALL AND LARGE INTESTINE WITHOUT COMPLICATION (HCC): Primary | ICD-10-CM

## 2021-04-26 PROCEDURE — G8427 DOCREV CUR MEDS BY ELIG CLIN: HCPCS | Performed by: SPECIALIST

## 2021-04-26 PROCEDURE — 3017F COLORECTAL CA SCREEN DOC REV: CPT | Performed by: SPECIALIST

## 2021-04-26 PROCEDURE — 99213 OFFICE O/P EST LOW 20 MIN: CPT | Performed by: SPECIALIST

## 2021-04-26 PROCEDURE — 1123F ACP DISCUSS/DSCN MKR DOCD: CPT | Performed by: SPECIALIST

## 2021-04-26 PROCEDURE — 4040F PNEUMOC VAC/ADMIN/RCVD: CPT | Performed by: SPECIALIST

## 2021-04-26 PROCEDURE — G8417 CALC BMI ABV UP PARAM F/U: HCPCS | Performed by: SPECIALIST

## 2021-04-26 PROCEDURE — 4004F PT TOBACCO SCREEN RCVD TLK: CPT | Performed by: SPECIALIST

## 2021-04-26 RX ORDER — FENOFIBRATE 145 MG/1
145 TABLET, COATED ORAL DAILY
COMMUNITY
Start: 2021-02-27

## 2021-04-26 NOTE — PROGRESS NOTES
Gastroenterology Office Note            Jessa Timmons. Sandi Hill MD      Subjective:      Patient ID:      Marleen Night                 1950    HPI:   Had virtual visit with Dr Trenton Meyers at [facility] 1 week ago- reviewed his note- they are planning flexible sigmoidoscopy and CTE at [facility]. Having 5-6 BM/d- a this baseline. Had \"temporary blockage\" last month that lasted a day or two. Weight stable or increased.  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

## 2021-04-29 ENCOUNTER — HOSPITAL ENCOUNTER (OUTPATIENT)
Dept: WOMENS IMAGING | Age: 71
Discharge: HOME | End: 2021-04-29
Payer: MEDICARE

## 2021-04-29 DIAGNOSIS — Z79.52 LONG TERM CURRENT USE OF SYSTEMIC STEROIDS: ICD-10-CM

## 2021-04-29 PROCEDURE — 77080 DXA BONE DENSITY AXIAL: CPT

## 2021-06-08 ENCOUNTER — HOSPITAL ENCOUNTER (OUTPATIENT)
Dept: INFUSION THERAPY | Age: 71
Setting detail: INFUSION SERIES
Discharge: HOME | End: 2021-06-08
Payer: MEDICARE

## 2021-06-08 VITALS
TEMPERATURE: 98.1 F | SYSTOLIC BLOOD PRESSURE: 142 MMHG | RESPIRATION RATE: 16 BRPM | HEART RATE: 63 BPM | OXYGEN SATURATION: 97 % | DIASTOLIC BLOOD PRESSURE: 81 MMHG

## 2021-06-08 DIAGNOSIS — K50.80 CROHN'S DISEASE OF BOTH SMALL AND LARGE INTESTINE WITHOUT COMPLICATION (HCC): Primary | ICD-10-CM

## 2021-06-08 PROCEDURE — 96365 THER/PROPH/DIAG IV INF INIT: CPT

## 2021-06-08 PROCEDURE — 99211 OFF/OP EST MAY X REQ PHY/QHP: CPT

## 2021-06-08 PROCEDURE — 6360000002 HC RX W HCPCS: Performed by: SPECIALIST

## 2021-06-08 PROCEDURE — 2580000003 HC RX 258: Performed by: SPECIALIST

## 2021-06-08 RX ORDER — SODIUM CHLORIDE 0.9 % (FLUSH) 0.9 %
30 SYRINGE (ML) INJECTION ONCE
Status: COMPLETED | OUTPATIENT
Start: 2021-06-08 | End: 2021-06-08

## 2021-06-08 RX ORDER — SODIUM CHLORIDE 0.9 % (FLUSH) 0.9 %
30 SYRINGE (ML) INJECTION ONCE
Status: CANCELLED | OUTPATIENT
Start: 2021-08-03

## 2021-06-08 RX ORDER — SODIUM CHLORIDE 0.9 % (FLUSH) 0.9 %
10 SYRINGE (ML) INJECTION PRN
Status: CANCELLED | OUTPATIENT
Start: 2021-08-03

## 2021-06-08 RX ORDER — SODIUM CHLORIDE 0.9 % (FLUSH) 0.9 %
10 SYRINGE (ML) INJECTION PRN
Status: DISCONTINUED | OUTPATIENT
Start: 2021-06-08 | End: 2021-06-09 | Stop reason: HOSPADM

## 2021-06-08 RX ADMIN — SODIUM CHLORIDE, PRESERVATIVE FREE 30 ML: 5 INJECTION INTRAVENOUS at 10:48

## 2021-06-08 RX ADMIN — VEDOLIZUMAB 300 MG: 300 INJECTION, POWDER, LYOPHILIZED, FOR SOLUTION INTRAVENOUS at 10:12

## 2021-06-08 RX ADMIN — SODIUM CHLORIDE, PRESERVATIVE FREE 10 ML: 5 INJECTION INTRAVENOUS at 10:11

## 2021-06-08 NOTE — PROGRESS NOTES
Tolerated infusion well. Reviewed discharge instruction, voiced understanding. Copies of AVS given. Pt discharged home. Pt to exit via steady gait.     Orders Placed This Encounter   Medications    vedolizumab (ENTYVIO) 300 mg in sodium chloride 0.9 % 250 mL infusion    sodium chloride flush 0.9 % injection 30 mL    sodium chloride flush 0.9 % injection 10 mL

## 2021-07-05 PROBLEM — N17.0 ACUTE RENAL FAILURE WITH TUBULAR NECROSIS (HCC): Status: ACTIVE | Noted: 2021-07-05

## 2021-07-05 PROBLEM — N18.31 STAGE 3A CHRONIC KIDNEY DISEASE (HCC): Status: ACTIVE | Noted: 2021-07-05

## 2021-07-05 PROBLEM — E78.1 HIGH TRIGLYCERIDES: Status: ACTIVE | Noted: 2021-07-05

## 2021-07-05 PROBLEM — F41.9 ANXIETY: Status: ACTIVE | Noted: 2021-07-05

## 2021-07-07 LAB
ABSOLUTE IMMATURE GRANULOCYTE: 0 K/UL (ref 0–0.1)
BASOPHILS ABSOLUTE: 0.1 K/UL (ref 0–0.3)
BASOPHILS RELATIVE PERCENT: 1.2 % (ref 0–2)
DIFFERENTIAL TYPE: ABNORMAL
EOSINOPHILS ABSOLUTE: 0.2 K/UL (ref 0–0.5)
EOSINOPHILS RELATIVE PERCENT: 3.6 % (ref 0–5)
HCT VFR BLD CALC: 41.2 % (ref 37.5–51)
HEMOGLOBIN: 14.2 G/DL (ref 13–17.7)
IMMATURE GRANULOCYTES: 0.4 %
LYMPHOCYTES ABSOLUTE: 1.5 K/UL (ref 0.9–4.1)
LYMPHOCYTES RELATIVE PERCENT: 29.9 % (ref 14–51)
MCH RBC QN AUTO: 32.7 PG (ref 26–34)
MCHC RBC AUTO-ENTMCNC: 34.5 G/DL (ref 30.7–35.5)
MCV RBC AUTO: 94.9 FL (ref 80–100)
MONOCYTES ABSOLUTE: 0.6 K/UL (ref 0.2–1)
MONOCYTES RELATIVE PERCENT: 12.3 % (ref 4–12)
NEUTROPHILS ABSOLUTE: 2.6 K/UL (ref 1.8–7.5)
NEUTROPHILS RELATIVE PERCENT: 52.6 % (ref 42–80)
NUCLEATED RBCS: 0 /100 WBC
PDW BLD-RTO: 12.5 %
PLATELET # BLD: 188 K/UL (ref 140–400)
PMV BLD AUTO: 11.7 FL (ref 7.2–11.7)
RBC # BLD: 4.34 M/UL (ref 4.14–5.8)
WBC: 5 K/UL (ref 3.5–10.9)

## 2021-07-19 RX ORDER — MELATONIN
1000 DAILY
Qty: 30 TABLET | Refills: 5 | Status: SHIPPED | OUTPATIENT
Start: 2021-07-19 | End: 2021-11-19 | Stop reason: DRUGHIGH

## 2021-07-26 ENCOUNTER — OFFICE VISIT (OUTPATIENT)
Dept: GASTROENTEROLOGY | Age: 71
End: 2021-07-26
Payer: MEDICARE

## 2021-07-26 VITALS
WEIGHT: 185.4 LBS | DIASTOLIC BLOOD PRESSURE: 64 MMHG | TEMPERATURE: 97 F | BODY MASS INDEX: 29.1 KG/M2 | HEART RATE: 68 BPM | OXYGEN SATURATION: 98 % | SYSTOLIC BLOOD PRESSURE: 120 MMHG | HEIGHT: 67 IN

## 2021-07-26 DIAGNOSIS — K50.814 CROHN'S DISEASE OF BOTH SMALL AND LARGE INTESTINE WITH ABSCESS (HCC): Primary | ICD-10-CM

## 2021-07-26 PROCEDURE — 99214 OFFICE O/P EST MOD 30 MIN: CPT | Performed by: SPECIALIST

## 2021-07-26 PROCEDURE — 4040F PNEUMOC VAC/ADMIN/RCVD: CPT | Performed by: SPECIALIST

## 2021-07-26 PROCEDURE — 3017F COLORECTAL CA SCREEN DOC REV: CPT | Performed by: SPECIALIST

## 2021-07-26 PROCEDURE — 1036F TOBACCO NON-USER: CPT | Performed by: SPECIALIST

## 2021-07-26 PROCEDURE — 1123F ACP DISCUSS/DSCN MKR DOCD: CPT | Performed by: SPECIALIST

## 2021-07-26 PROCEDURE — G8417 CALC BMI ABV UP PARAM F/U: HCPCS | Performed by: SPECIALIST

## 2021-07-26 PROCEDURE — G8427 DOCREV CUR MEDS BY ELIG CLIN: HCPCS | Performed by: SPECIALIST

## 2021-07-26 NOTE — PROGRESS NOTES
Gastroenterology Office Note            Phyllis Rodríguez. Alexx Rosado MD      Subjective:      Patient ID:      Meri Alfaro                 1950    HPI:   Has at least 6 BM/d and some arthralgias and fatigue. Has occasional transient bouts of SBO- unusually but not always from dietary indiscretion. . Was at [facility]- sigmoidoscopy showed the ileocolic anastamotic stricture and the scope could not be advanced into the rufina-terminal ileum (?)- not sure if they tried a gastroscope. CT enterography was done and questioned inflammation in the rufina-terminal ileum (in addition to the known ICA stricture. His Vit B12 level was recently normal but Vit D was low- started on Vit D replacement 1000 units per day. No weight loss, fever    Review of Systems:    Pertinent items included in the HPI      Objective:   PHYSICAL EXAM:    Vitals:  /64 (Site: Right Upper Arm, Position: Sitting, Cuff Size: Medium Adult)   Pulse 68   Temp 97 °F (36.1 °C) (Infrared)   Ht 5' 7\" (1.702 m)   Wt 185 lb 6.4 oz (84.1 kg)   SpO2 98%   BMI 29.04 kg/m²   CONSTITUTIONAL: alert    LUNGS:  clear to auscultation  CARDIOVASCULAR:  regular rate and rhythm and no murmur noted  ABDOMEN:  normal bowel sounds, non-distended, non-tender and no masses palpated, no hepatosplenomegaly  EXTREMITIES: no clubbing, cyanosis, or edema    Assessment:      1) Crohn's ileocolitis- S/P extensive resection and ileocolic anastamosis with chronic stricture  2) ? active disease ion the rufina-terminal ileum- if so, would increase Entyvio to every 4 weeks  3) Vit D deficiency      Plan:      Discussed options of repeating CRP and fecal calprotectin vs repeat sigmoidoscopy with gastroscope- he chose the latter  Continue Vit D replacement and check level in 3 months  Return 3 months  Call if any problems

## 2021-07-29 ENCOUNTER — HOSPITAL ENCOUNTER (OUTPATIENT)
Age: 71
Setting detail: SPECIMEN
Discharge: HOME | End: 2021-07-29
Payer: MEDICARE

## 2021-07-29 ENCOUNTER — HOSPITAL ENCOUNTER (OUTPATIENT)
Age: 71
Discharge: HOME | End: 2021-07-29
Payer: MEDICARE

## 2021-07-29 DIAGNOSIS — K50.814 CROHN'S DISEASE OF BOTH SMALL AND LARGE INTESTINE WITH ABSCESS (HCC): ICD-10-CM

## 2021-07-29 LAB — C-REACTIVE PROTEIN, HIGH SENSITIVITY: 3.5 MG/L

## 2021-07-29 PROCEDURE — 86141 C-REACTIVE PROTEIN HS: CPT

## 2021-07-29 PROCEDURE — 36415 COLL VENOUS BLD VENIPUNCTURE: CPT

## 2021-07-29 PROCEDURE — 87449 NOS EACH ORGANISM AG IA: CPT

## 2021-07-29 PROCEDURE — 87324 CLOSTRIDIUM AG IA: CPT

## 2021-07-29 PROCEDURE — 86140 C-REACTIVE PROTEIN: CPT

## 2021-07-30 ENCOUNTER — TELEPHONE (OUTPATIENT)
Dept: GASTROENTEROLOGY | Age: 71
End: 2021-07-30

## 2021-07-30 LAB
REASON FOR REJECTION: NORMAL
REJECTED TEST: NORMAL

## 2021-08-03 ENCOUNTER — HOSPITAL ENCOUNTER (OUTPATIENT)
Dept: INFUSION THERAPY | Age: 71
Setting detail: INFUSION SERIES
Discharge: HOME | End: 2021-08-03
Payer: MEDICARE

## 2021-08-03 VITALS
OXYGEN SATURATION: 98 % | HEART RATE: 71 BPM | TEMPERATURE: 97.7 F | SYSTOLIC BLOOD PRESSURE: 163 MMHG | DIASTOLIC BLOOD PRESSURE: 77 MMHG | RESPIRATION RATE: 16 BRPM

## 2021-08-03 DIAGNOSIS — K50.80 CROHN'S DISEASE OF BOTH SMALL AND LARGE INTESTINE WITHOUT COMPLICATION (HCC): Primary | ICD-10-CM

## 2021-08-03 PROCEDURE — 2580000003 HC RX 258: Performed by: SPECIALIST

## 2021-08-03 PROCEDURE — 96365 THER/PROPH/DIAG IV INF INIT: CPT

## 2021-08-03 PROCEDURE — 99211 OFF/OP EST MAY X REQ PHY/QHP: CPT

## 2021-08-03 RX ORDER — SODIUM CHLORIDE 0.9 % (FLUSH) 0.9 %
10 SYRINGE (ML) INJECTION PRN
Status: DISCONTINUED | OUTPATIENT
Start: 2021-08-03 | End: 2021-08-04 | Stop reason: HOSPADM

## 2021-08-03 RX ORDER — SODIUM CHLORIDE 0.9 % (FLUSH) 0.9 %
30 SYRINGE (ML) INJECTION ONCE
Status: CANCELLED | OUTPATIENT
Start: 2021-09-28

## 2021-08-03 RX ORDER — SODIUM CHLORIDE 0.9 % (FLUSH) 0.9 %
30 SYRINGE (ML) INJECTION ONCE
Status: COMPLETED | OUTPATIENT
Start: 2021-08-03 | End: 2021-08-03

## 2021-08-03 RX ORDER — SODIUM CHLORIDE 0.9 % (FLUSH) 0.9 %
10 SYRINGE (ML) INJECTION PRN
Status: CANCELLED | OUTPATIENT
Start: 2021-09-28

## 2021-08-03 RX ADMIN — SODIUM CHLORIDE, PRESERVATIVE FREE 10 ML: 5 INJECTION INTRAVENOUS at 09:39

## 2021-08-03 RX ADMIN — SODIUM CHLORIDE, PRESERVATIVE FREE 30 ML: 5 INJECTION INTRAVENOUS at 10:26

## 2021-08-12 LAB
ABSOLUTE IMMATURE GRANULOCYTE: 0 K/UL (ref 0–0.1)
ALBUMIN: 4.3 G/DL (ref 3.5–5.2)
BASOPHILS ABSOLUTE: 0.1 K/UL (ref 0–0.3)
BASOPHILS RELATIVE PERCENT: 1 % (ref 0–2)
BILIRUBIN URINE: NEGATIVE MG/DL
BUN BLDV-MCNC: 17 MG/DL (ref 3–29)
BUN/CREAT BLD: 11 (ref 7–25)
CALCIUM SERPL-MCNC: 9.7 MG/DL (ref 8.5–10.5)
CHLORIDE BLD-SCNC: 106 MEQ/L (ref 96–110)
CLARITY: CLEAR
CO2: 29 MEQ/L (ref 19–32)
COLOR, URINE: YELLOW
CREAT SERPL-MCNC: 1.5 MG/DL (ref 0.5–1.4)
CREATININE URINE: 123.4 MG/DL
DIFFERENTIAL TYPE: ABNORMAL
EOSINOPHILS ABSOLUTE: 0.2 K/UL (ref 0–0.5)
EOSINOPHILS RELATIVE PERCENT: 3.5 % (ref 0–5)
FASTING STATUS: ABNORMAL
GFR AFRICAN AMERICAN: 53 MLS/MIN/1.73M2
GFR NON-AFRICAN AMERICAN: 46 MLS/MIN/1.73M2
GLUCOSE BLD-MCNC: 88 MG/DL (ref 70–99)
GLUCOSE URINE: NEGATIVE MG/DL
HCT VFR BLD CALC: 40.4 % (ref 37.5–51)
HEMOGLOBIN: 13.8 G/DL (ref 13–17.7)
IMMATURE GRANULOCYTES: 0.4 %
KETONES, URINE: NEGATIVE MG/DL
LEUKOCYTE ESTERASE, URINE: NEGATIVE
LYMPHOCYTES ABSOLUTE: 1.5 K/UL (ref 0.9–4.1)
LYMPHOCYTES RELATIVE PERCENT: 31.3 % (ref 14–51)
MCH RBC QN AUTO: 32.5 PG (ref 26–34)
MCHC RBC AUTO-ENTMCNC: 34.2 G/DL (ref 30.7–35.5)
MCV RBC AUTO: 95.3 FL (ref 80–100)
MONOCYTES ABSOLUTE: 0.6 K/UL (ref 0.2–1)
MONOCYTES RELATIVE PERCENT: 12.1 % (ref 4–12)
NEUTROPHILS ABSOLUTE: 2.5 K/UL (ref 1.8–7.5)
NEUTROPHILS RELATIVE PERCENT: 51.7 % (ref 42–80)
NITRATE, UA: NEGATIVE
NUCLEATED RBCS: 0 /100 WBC
PDW BLD-RTO: 12.3 %
PH, URINE: 5.5 (ref 4.5–8)
PHOSPHORUS: 2.7 MG/DL (ref 2.1–4.3)
PLATELET # BLD: 189 K/UL (ref 140–400)
PMV BLD AUTO: 11.5 FL (ref 7.2–11.7)
POTASSIUM SERPL-SCNC: 4.7 MEQ/L (ref 3.4–5.3)
PROT/CREAT RATIO, UR: 0 RATIO (ref 5–170)
RBC # BLD: 4.24 M/UL (ref 4.14–5.8)
SODIUM BLD-SCNC: 143 MEQ/L (ref 135–148)
SPECIFIC GRAVITY, URINE: 1.02 (ref 1–1.03)
TOTAL PROTEIN, URINE: 4 MG/DL
TOTAL PROTEIN, URINE: NEGATIVE MG/DL
URINE HGB: NEGATIVE MG/DL
UROBILINOGEN, URINE: <2 MG/DL (ref 0–2)
WBC: 4.9 K/UL (ref 3.5–10.9)

## 2021-08-17 ENCOUNTER — TELEPHONE (OUTPATIENT)
Dept: GASTROENTEROLOGY | Age: 71
End: 2021-08-17

## 2021-09-22 RX ORDER — VEDOLIZUMAB 300 MG/5ML
INJECTION, POWDER, LYOPHILIZED, FOR SOLUTION INTRAVENOUS
Qty: 2 EACH | Refills: 5 | Status: SHIPPED | OUTPATIENT
Start: 2021-09-22

## 2021-09-22 NOTE — TELEPHONE ENCOUNTER
Patient seen by QDOC RN, 20G IV placed in RAC. Blood work sent to lab. Shay Mcnulty from Pathology called and stated the patient's stool specimen for C-dif was rejection because the specimen was too formed.

## 2021-09-23 ENCOUNTER — TELEPHONE (OUTPATIENT)
Dept: GASTROENTEROLOGY | Age: 71
End: 2021-09-23

## 2021-09-23 NOTE — TELEPHONE ENCOUNTER
Phoned patient yesterday, let him know that his requested refill of Entyvio has been sent in by Dr. Gali Henderson to [facility].

## 2021-09-28 ENCOUNTER — HOSPITAL ENCOUNTER (OUTPATIENT)
Dept: INFUSION THERAPY | Age: 71
Setting detail: INFUSION SERIES
Discharge: HOME | End: 2021-09-28
Payer: MEDICARE

## 2021-09-28 VITALS
SYSTOLIC BLOOD PRESSURE: 138 MMHG | OXYGEN SATURATION: 96 % | DIASTOLIC BLOOD PRESSURE: 82 MMHG | TEMPERATURE: 97.2 F | HEART RATE: 86 BPM | RESPIRATION RATE: 16 BRPM

## 2021-09-28 DIAGNOSIS — K50.80 CROHN'S DISEASE OF BOTH SMALL AND LARGE INTESTINE WITHOUT COMPLICATION (HCC): Primary | ICD-10-CM

## 2021-09-28 PROCEDURE — 2580000003 HC RX 258: Performed by: SPECIALIST

## 2021-09-28 PROCEDURE — 96365 THER/PROPH/DIAG IV INF INIT: CPT

## 2021-09-28 PROCEDURE — 99211 OFF/OP EST MAY X REQ PHY/QHP: CPT

## 2021-09-28 RX ORDER — SODIUM CHLORIDE 0.9 % (FLUSH) 0.9 %
30 SYRINGE (ML) INJECTION ONCE
Status: CANCELLED | OUTPATIENT
Start: 2021-11-23

## 2021-09-28 RX ORDER — SODIUM CHLORIDE 0.9 % (FLUSH) 0.9 %
10 SYRINGE (ML) INJECTION PRN
Status: DISCONTINUED | OUTPATIENT
Start: 2021-09-28 | End: 2021-09-29 | Stop reason: HOSPADM

## 2021-09-28 RX ORDER — SODIUM CHLORIDE 0.9 % (FLUSH) 0.9 %
10 SYRINGE (ML) INJECTION PRN
Status: CANCELLED | OUTPATIENT
Start: 2021-11-23

## 2021-09-28 RX ORDER — SODIUM CHLORIDE 0.9 % (FLUSH) 0.9 %
30 SYRINGE (ML) INJECTION ONCE
Status: COMPLETED | OUTPATIENT
Start: 2021-09-28 | End: 2021-09-28

## 2021-09-28 RX ADMIN — SODIUM CHLORIDE, PRESERVATIVE FREE 10 ML: 5 INJECTION INTRAVENOUS at 09:48

## 2021-09-28 RX ADMIN — SODIUM CHLORIDE, PRESERVATIVE FREE 30 ML: 5 INJECTION INTRAVENOUS at 10:27

## 2021-09-28 NOTE — PROGRESS NOTES
Tolerated infusion well. Reviewed discharge instruction, voiced understanding. Copies of AVS given. Pt discharged home. Pt to exit via steady gait.     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

## 2021-10-14 ENCOUNTER — TELEPHONE (OUTPATIENT)
Dept: GASTROENTEROLOGY | Age: 71
End: 2021-10-14

## 2021-10-14 RX ORDER — LOPERAMIDE HYDROCHLORIDE 2 MG/1
2 TABLET ORAL 4 TIMES DAILY
Qty: 360 TABLET | Refills: 3 | Status: SHIPPED | OUTPATIENT
Start: 2021-10-14 | End: 2021-10-18 | Stop reason: SDUPTHER

## 2021-10-14 NOTE — TELEPHONE ENCOUNTER
Patient wanting refill imodium AD  2 mg 4xday  Requesting 90 day script    [address] [facility].

## 2021-10-18 ENCOUNTER — TELEPHONE (OUTPATIENT)
Dept: GASTROENTEROLOGY | Age: 71
End: 2021-10-18

## 2021-10-18 RX ORDER — LOPERAMIDE HYDROCHLORIDE 2 MG/1
2 TABLET ORAL 4 TIMES DAILY
Qty: 360 TABLET | Refills: 3 | Status: SHIPPED | OUTPATIENT
Start: 2021-10-18 | End: 2022-08-18

## 2021-10-19 ENCOUNTER — TELEPHONE (OUTPATIENT)
Dept: GASTROENTEROLOGY | Age: 71
End: 2021-10-19

## 2021-10-19 NOTE — TELEPHONE ENCOUNTER
Patient requesting a standing order for bloodwork every three months.   Patient is going to [facility] and will  script in office

## 2021-10-29 ENCOUNTER — OFFICE VISIT (OUTPATIENT)
Dept: GASTROENTEROLOGY | Age: 71
End: 2021-10-29
Payer: MEDICARE

## 2021-10-29 VITALS
BODY MASS INDEX: 28.91 KG/M2 | HEART RATE: 80 BPM | WEIGHT: 184.2 LBS | TEMPERATURE: 97.1 F | OXYGEN SATURATION: 93 % | DIASTOLIC BLOOD PRESSURE: 62 MMHG | SYSTOLIC BLOOD PRESSURE: 116 MMHG | HEIGHT: 67 IN

## 2021-10-29 DIAGNOSIS — K50.814 CROHN'S DISEASE OF BOTH SMALL AND LARGE INTESTINE WITH ABSCESS (HCC): Primary | ICD-10-CM

## 2021-10-29 PROCEDURE — 99214 OFFICE O/P EST MOD 30 MIN: CPT | Performed by: SPECIALIST

## 2021-10-29 PROCEDURE — 4040F PNEUMOC VAC/ADMIN/RCVD: CPT | Performed by: SPECIALIST

## 2021-10-29 PROCEDURE — G8417 CALC BMI ABV UP PARAM F/U: HCPCS | Performed by: SPECIALIST

## 2021-10-29 PROCEDURE — G8427 DOCREV CUR MEDS BY ELIG CLIN: HCPCS | Performed by: SPECIALIST

## 2021-10-29 PROCEDURE — 1036F TOBACCO NON-USER: CPT | Performed by: SPECIALIST

## 2021-10-29 PROCEDURE — 3017F COLORECTAL CA SCREEN DOC REV: CPT | Performed by: SPECIALIST

## 2021-10-29 PROCEDURE — 1123F ACP DISCUSS/DSCN MKR DOCD: CPT | Performed by: SPECIALIST

## 2021-10-29 PROCEDURE — G8484 FLU IMMUNIZE NO ADMIN: HCPCS | Performed by: SPECIALIST

## 2021-10-29 NOTE — PROGRESS NOTES
Gastroenterology Office Note            Lisha Taylor. Rosales Rome MD      Subjective:      Patient ID:      Susie De La Cruz                 1950    HPI:   Doing well- does complain of some arthralgias. CRP, CBC in July were OK -- stool calprotectin and C diff ordered but not done. Bowels have not changed- 5 loose stools per day without blood. No skin or eye symptoms. Flex sig at [facility] in May showed anast stricture and inflammation and scope could not be passed through the anastamosis. CT enterography showed the stricture and was suspicious for inflammation in the rufina-terminal ileum. No abd pain,weight loss, fever. Review of Systems:    Pertinent items included in the HPI      Objective:   PHYSICAL EXAM:    Vitals:  /62 (Site: Left Upper Arm, Position: Sitting, Cuff Size: Medium Adult)   Pulse 80   Temp 97.1 °F (36.2 °C) (Infrared)   Ht 5' 7\" (1.702 m)   Wt 184 lb 3.2 oz (83.6 kg)   SpO2 93%   BMI 28.85 kg/m²   CONSTITUTIONAL: alert    LUNGS:  clear to auscultation  CARDIOVASCULAR:  regular rate and rhythm and no murmur noted  ABDOMEN:  normal bowel sounds, non-distended, non-tender and no masses palpated, no hepatosplenomegaly  EXTREMITIES: no clubbing, cyanosis, or edema    Assessment:      1) Crohn's ileocolitis- S/P extensive resection and ileocolic anastamosis with chronic stricture  2) ? active disease ion the rufina-terminal ileum- if so, would increase Entyvio to every 4 weeks  3) Vit D deficiency      Plan:      Continue current meds  CBC, CMP, CRP, Vit D  Advised him to talk to [facility]- if they wasn't to see him, make an appt (even virtual if they want) and consider repeat flex sigmoid ( ?with a gastroscope) to see if in deep remission- if not increase Entyvio to monthly  return 3 months  Call if any problems

## 2021-11-03 LAB
ALBUMIN/GLOBULIN RATIO: 2.3 RATIO (ref 0.8–2.6)
ALBUMIN: 5.2 G/DL (ref 3.5–5.2)
ALP BLD-CCNC: 80 U/L (ref 23–144)
ALT SERPL-CCNC: 8 U/L (ref 0–60)
AST SERPL-CCNC: 23 U/L (ref 0–55)
BILIRUB SERPL-MCNC: 0.5 MG/DL (ref 0–1.2)
BUN BLDV-MCNC: 23 MG/DL (ref 3–29)
BUN/CREAT BLD: 13 (ref 7–25)
C-REACTIVE PROTEIN: 0.45 MG/DL
CALCIUM SERPL-MCNC: 10.2 MG/DL (ref 8.5–10.5)
CHLORIDE BLD-SCNC: 104 MEQ/L (ref 96–110)
CO2: 26 MEQ/L (ref 19–32)
CREAT SERPL-MCNC: 1.8 MG/DL (ref 0.5–1.4)
GLOBULIN: 2.3 G/DL (ref 1.9–3.6)
GLOMERULAR FILTRATION RATE: 37 MLS/MIN/1.73M2
GLUCOSE BLD-MCNC: 92 MG/DL (ref 70–99)
HCT VFR BLD CALC: 45.5 % (ref 37.5–51)
HEMOGLOBIN: 15.4 G/DL (ref 13–17.7)
MCH RBC QN AUTO: 32.7 PG (ref 26–34)
MCHC RBC AUTO-ENTMCNC: 33.8 G/DL (ref 30.7–35.5)
MCV RBC AUTO: 96.6 FL (ref 80–100)
PDW BLD-RTO: 12.1 %
PLATELET # BLD: 279 K/UL (ref 140–400)
PMV BLD AUTO: 11.3 FL (ref 7.2–11.7)
POTASSIUM SERPL-SCNC: 4.6 MEQ/L (ref 3.4–5.3)
RBC # BLD: 4.71 M/UL (ref 4.14–5.8)
SODIUM BLD-SCNC: 143 MEQ/L (ref 135–148)
STATUS: ABNORMAL
TOTAL PROTEIN: 7.5 G/DL (ref 6–8.3)
VITAMIN D 25-HYDROXY: 24 NG/ML (ref 30–100)
WBC: 7.9 K/UL (ref 3.5–10.9)

## 2021-11-19 RX ORDER — MELATONIN
1000 DAILY
Qty: 30 TABLET | Refills: 5 | Status: SHIPPED | OUTPATIENT
Start: 2021-11-19 | End: 2022-08-18

## 2021-11-19 RX ORDER — MELATONIN
1500 DAILY
Qty: 45 TABLET | Refills: 11 | Status: SHIPPED
Start: 2021-11-19 | End: 2021-11-19 | Stop reason: DRUGHIGH

## 2021-11-19 NOTE — RESULT ENCOUNTER NOTE
25 [place] Vit d level sl low  He takes 25 mcg/d- recommended he take 2 on mitchel-numbered days and one on add-numbered days

## 2021-11-23 ENCOUNTER — HOSPITAL ENCOUNTER (OUTPATIENT)
Dept: INFUSION THERAPY | Age: 71
Setting detail: INFUSION SERIES
Discharge: HOME | End: 2021-11-23
Payer: MEDICARE

## 2021-11-23 VITALS
DIASTOLIC BLOOD PRESSURE: 78 MMHG | TEMPERATURE: 97.3 F | HEART RATE: 87 BPM | OXYGEN SATURATION: 97 % | RESPIRATION RATE: 16 BRPM | SYSTOLIC BLOOD PRESSURE: 135 MMHG

## 2021-11-23 DIAGNOSIS — K50.80 CROHN'S DISEASE OF BOTH SMALL AND LARGE INTESTINE WITHOUT COMPLICATION (HCC): Primary | ICD-10-CM

## 2021-11-23 PROCEDURE — 6360000002 HC RX W HCPCS: Performed by: SPECIALIST

## 2021-11-23 PROCEDURE — 99211 OFF/OP EST MAY X REQ PHY/QHP: CPT

## 2021-11-23 PROCEDURE — 96365 THER/PROPH/DIAG IV INF INIT: CPT

## 2021-11-23 PROCEDURE — 2580000003 HC RX 258: Performed by: SPECIALIST

## 2021-11-23 RX ORDER — SODIUM CHLORIDE 0.9 % (FLUSH) 0.9 %
30 SYRINGE (ML) INJECTION ONCE
Status: CANCELLED | OUTPATIENT
Start: 2022-01-18

## 2021-11-23 RX ORDER — SODIUM CHLORIDE 0.9 % (FLUSH) 0.9 %
30 SYRINGE (ML) INJECTION ONCE
Status: COMPLETED | OUTPATIENT
Start: 2021-11-23 | End: 2021-11-23

## 2021-11-23 RX ORDER — SODIUM CHLORIDE 0.9 % (FLUSH) 0.9 %
10 SYRINGE (ML) INJECTION PRN
Status: CANCELLED | OUTPATIENT
Start: 2022-01-18

## 2021-11-23 RX ORDER — SODIUM CHLORIDE 0.9 % (FLUSH) 0.9 %
10 SYRINGE (ML) INJECTION PRN
Status: DISCONTINUED | OUTPATIENT
Start: 2021-11-23 | End: 2021-11-23

## 2021-11-23 RX ADMIN — VEDOLIZUMAB 300 MG: 300 INJECTION, POWDER, LYOPHILIZED, FOR SOLUTION INTRAVENOUS at 09:45

## 2021-11-23 RX ADMIN — SODIUM CHLORIDE, PRESERVATIVE FREE 10 ML: 5 INJECTION INTRAVENOUS at 09:45

## 2021-11-23 RX ADMIN — SODIUM CHLORIDE, PRESERVATIVE FREE 30 ML: 5 INJECTION INTRAVENOUS at 10:20

## 2021-11-30 RX ORDER — SODIUM CHLORIDE 0.9 % (FLUSH) 0.9 %
5-40 SYRINGE (ML) INJECTION PRN
Status: CANCELLED | OUTPATIENT
Start: 2022-01-18

## 2021-11-30 RX ORDER — SODIUM CHLORIDE 0.9 % (FLUSH) 0.9 %
30 SYRINGE (ML) INJECTION ONCE
Status: CANCELLED | OUTPATIENT
Start: 2022-01-18

## 2021-11-30 RX ORDER — SODIUM CHLORIDE 9 MG/ML
INJECTION, SOLUTION INTRAVENOUS CONTINUOUS
Status: CANCELLED | OUTPATIENT
Start: 2022-01-18

## 2022-01-18 ENCOUNTER — HOSPITAL ENCOUNTER (OUTPATIENT)
Dept: INFUSION THERAPY | Age: 72
Setting detail: INFUSION SERIES
Discharge: HOME | End: 2022-01-18
Payer: MEDICARE

## 2022-01-18 VITALS
DIASTOLIC BLOOD PRESSURE: 79 MMHG | SYSTOLIC BLOOD PRESSURE: 139 MMHG | TEMPERATURE: 97.3 F | OXYGEN SATURATION: 98 % | HEART RATE: 84 BPM | RESPIRATION RATE: 16 BRPM

## 2022-01-18 DIAGNOSIS — K50.80 CROHN'S DISEASE OF BOTH SMALL AND LARGE INTESTINE WITHOUT COMPLICATION (HCC): Primary | ICD-10-CM

## 2022-01-18 PROCEDURE — 96365 THER/PROPH/DIAG IV INF INIT: CPT

## 2022-01-18 PROCEDURE — 6360000002 HC RX W HCPCS: Performed by: SPECIALIST

## 2022-01-18 PROCEDURE — 2580000003 HC RX 258: Performed by: SPECIALIST

## 2022-01-18 PROCEDURE — 99211 OFF/OP EST MAY X REQ PHY/QHP: CPT

## 2022-01-18 RX ORDER — SODIUM CHLORIDE 0.9 % (FLUSH) 0.9 %
5-40 SYRINGE (ML) INJECTION PRN
Status: CANCELLED | OUTPATIENT
Start: 2022-03-15

## 2022-01-18 RX ORDER — SODIUM CHLORIDE 0.9 % (FLUSH) 0.9 %
5-40 SYRINGE (ML) INJECTION PRN
Status: DISCONTINUED | OUTPATIENT
Start: 2022-01-18 | End: 2022-01-19 | Stop reason: HOSPADM

## 2022-01-18 RX ORDER — SODIUM CHLORIDE 0.9 % (FLUSH) 0.9 %
30 SYRINGE (ML) INJECTION ONCE
Status: CANCELLED | OUTPATIENT
Start: 2022-03-15

## 2022-01-18 RX ORDER — SODIUM CHLORIDE 9 MG/ML
INJECTION, SOLUTION INTRAVENOUS CONTINUOUS
Status: CANCELLED | OUTPATIENT
Start: 2022-03-15

## 2022-01-18 RX ORDER — SODIUM CHLORIDE 0.9 % (FLUSH) 0.9 %
30 SYRINGE (ML) INJECTION ONCE
Status: COMPLETED | OUTPATIENT
Start: 2022-01-18 | End: 2022-01-18

## 2022-01-18 RX ADMIN — SODIUM CHLORIDE, PRESERVATIVE FREE 30 ML: 5 INJECTION INTRAVENOUS at 10:05

## 2022-01-18 RX ADMIN — VEDOLIZUMAB 300 MG: 300 INJECTION, POWDER, LYOPHILIZED, FOR SOLUTION INTRAVENOUS at 09:30

## 2022-01-18 RX ADMIN — SODIUM CHLORIDE, PRESERVATIVE FREE 10 ML: 5 INJECTION INTRAVENOUS at 09:28

## 2022-01-18 NOTE — PROGRESS NOTES
Tolerated INFSUION well. Reviewed discharge instruction, voiced understanding. Copies of AVS given. Pt discharged HOME. Pt to exit via STEADY GAIT.     Orders Placed This Encounter   Medications    vedolizumab (ENTYVIO) 300 mg in sodium chloride 0.9 % 250 mL infusion    sodium chloride flush 0.9 % injection 30 mL    sodium chloride flush 0.9 % injection 5-40 mL

## 2022-01-20 LAB
ABSOLUTE IMMATURE GRANULOCYTE: 0 K/UL (ref 0–0.1)
BASOPHILS ABSOLUTE: 0.1 K/UL (ref 0–0.3)
BASOPHILS RELATIVE PERCENT: 1.1 % (ref 0–2)
DIFFERENTIAL TYPE: NORMAL
EOSINOPHILS ABSOLUTE: 0.2 K/UL (ref 0–0.5)
EOSINOPHILS RELATIVE PERCENT: 3.6 % (ref 0–5)
HCT VFR BLD CALC: 40.8 % (ref 37.5–51)
HEMOGLOBIN: 14.3 G/DL (ref 13–17.7)
IMMATURE GRANULOCYTES: 0.3 %
LYMPHOCYTES ABSOLUTE: 1.8 K/UL (ref 0.9–4.1)
LYMPHOCYTES RELATIVE PERCENT: 30.1 % (ref 14–51)
MCH RBC QN AUTO: 33.1 PG (ref 26–34)
MCHC RBC AUTO-ENTMCNC: 35 G/DL (ref 30.7–35.5)
MCV RBC AUTO: 94.4 FL (ref 80–100)
MONOCYTES ABSOLUTE: 0.6 K/UL (ref 0.2–1)
MONOCYTES RELATIVE PERCENT: 9.8 % (ref 4–12)
NEUTROPHILS ABSOLUTE: 3.4 K/UL (ref 1.8–7.5)
NEUTROPHILS RELATIVE PERCENT: 55.1 % (ref 42–80)
NUCLEATED RBCS: 0 /100 WBC
PDW BLD-RTO: 12.2 %
PLATELET # BLD: 199 K/UL (ref 140–400)
PMV BLD AUTO: 11.3 FL (ref 7.2–11.7)
RBC # BLD: 4.32 M/UL (ref 4.14–5.8)
WBC: 6.1 K/UL (ref 3.5–10.9)

## 2022-01-31 ENCOUNTER — OFFICE VISIT (OUTPATIENT)
Dept: GASTROENTEROLOGY | Age: 72
End: 2022-01-31
Payer: MEDICARE

## 2022-01-31 VITALS
RESPIRATION RATE: 18 BRPM | WEIGHT: 181 LBS | DIASTOLIC BLOOD PRESSURE: 78 MMHG | SYSTOLIC BLOOD PRESSURE: 140 MMHG | OXYGEN SATURATION: 98 % | BODY MASS INDEX: 28.41 KG/M2 | HEART RATE: 80 BPM | HEIGHT: 67 IN

## 2022-01-31 DIAGNOSIS — K50.814 CROHN'S DISEASE OF BOTH SMALL AND LARGE INTESTINE WITH ABSCESS (HCC): Primary | ICD-10-CM

## 2022-01-31 PROCEDURE — 4040F PNEUMOC VAC/ADMIN/RCVD: CPT | Performed by: SPECIALIST

## 2022-01-31 PROCEDURE — G8417 CALC BMI ABV UP PARAM F/U: HCPCS | Performed by: SPECIALIST

## 2022-01-31 PROCEDURE — 99213 OFFICE O/P EST LOW 20 MIN: CPT | Performed by: SPECIALIST

## 2022-01-31 PROCEDURE — 1123F ACP DISCUSS/DSCN MKR DOCD: CPT | Performed by: SPECIALIST

## 2022-01-31 PROCEDURE — 3017F COLORECTAL CA SCREEN DOC REV: CPT | Performed by: SPECIALIST

## 2022-01-31 PROCEDURE — G8427 DOCREV CUR MEDS BY ELIG CLIN: HCPCS | Performed by: SPECIALIST

## 2022-01-31 PROCEDURE — 1036F TOBACCO NON-USER: CPT | Performed by: SPECIALIST

## 2022-01-31 PROCEDURE — G8484 FLU IMMUNIZE NO ADMIN: HCPCS | Performed by: SPECIALIST

## 2022-01-31 RX ORDER — DIPHENOXYLATE HYDROCHLORIDE AND ATROPINE SULFATE 2.5; .025 MG/1; MG/1
2 TABLET ORAL 2 TIMES DAILY
Qty: 120 TABLET | Refills: 5 | Status: SHIPPED | OUTPATIENT
Start: 2022-01-31 | End: 2022-07-30

## 2022-01-31 NOTE — PROGRESS NOTES
Gastroenterology Office Note            Scottie Bal MD      Subjective:      Patient ID:      Jeovanny Martinez                 1950    HPI:   Doing well- reports no problems  The patient denies abdominal pain,nausea,vomiting, diarrhea, constipation,melena, hematochezia,hematemesis,weight loss or dysphagia.   He increased his dose of vit D    Review of Systems:    Pertinent items included in the HPI      Objective:   PHYSICAL EXAM:    Vitals:  BP (!) 140/78 (Site: Left Upper Arm, Position: Sitting, Cuff Size: Medium Adult)   Pulse 80   Resp 18   Ht 5' 7\" (1.702 m)   Wt 181 lb (82.1 kg)   SpO2 98%   BMI 28.35 kg/m²   CONSTITUTIONAL: alert    LUNGS:  clear to auscultation  CARDIOVASCULAR:  regular rate and rhythm and no murmur noted  ABDOMEN:  normal bowel sounds, non-distended, non-tender and no masses palpated, no hepatosplenomegaly  EXTREMITIES: no clubbing, cyanosis, or edema    Assessment:      1) Crohn's ileocolitis- S/P extensive resection and ileocolic anastamosis with chronic stricture  2) Vit D deficiency      Plan:      Continue current meds (Entyvio every 8 weeks, lomotil, vit D)  Repeat 25 [place] Vit D level, CRP  He will call back to schedule surveillance colonoscopy  Return 3 months  Call if any problems

## 2022-02-08 LAB
ALBUMIN: 4.7 G/DL (ref 3.5–5.2)
BILIRUBIN URINE: NEGATIVE MG/DL
BUN BLDV-MCNC: 16 MG/DL (ref 3–29)
BUN/CREAT BLD: 11 (ref 7–25)
C-REACTIVE PROTEIN: <0.3 MG/DL
CALCIUM SERPL-MCNC: 9.4 MG/DL (ref 8.5–10.5)
CHLORIDE BLD-SCNC: 110 MEQ/L (ref 96–110)
CLARITY: CLEAR
CO2: 24 MEQ/L (ref 19–32)
COLOR, URINE: YELLOW
CREAT SERPL-MCNC: 1.5 MG/DL (ref 0.5–1.4)
CREATININE URINE: 252.5 MG/DL
FASTING STATUS: ABNORMAL
GLOMERULAR FILTRATION RATE: 46 MLS/MIN/1.73M2
GLUCOSE BLD-MCNC: 89 MG/DL (ref 70–99)
GLUCOSE URINE: NEGATIVE MG/DL
KETONES, URINE: NEGATIVE MG/DL
LEUKOCYTE ESTERASE, URINE: NEGATIVE
MAGNESIUM: 1.9 MG/DL (ref 1.4–2.5)
NITRATE, UA: NEGATIVE
PH, URINE: 5.5 (ref 4.5–8)
PHOSPHORUS: 3.1 MG/DL (ref 2.1–4.3)
POTASSIUM SERPL-SCNC: 4.4 MEQ/L (ref 3.4–5.3)
PROT/CREAT RATIO, UR: 0.1 RATIO (ref 5–170)
SODIUM BLD-SCNC: 144 MEQ/L (ref 135–148)
SPECIFIC GRAVITY, URINE: 1.02 (ref 1–1.03)
TOTAL PROTEIN, URINE: 10 MG/DL
TOTAL PROTEIN, URINE: 14 MG/DL
URINE HGB: NEGATIVE MG/DL
UROBILINOGEN, URINE: <2 MG/DL (ref 0–2)
VITAMIN D 25-HYDROXY: 17 NG/ML (ref 30–100)

## 2022-02-12 ENCOUNTER — TELEPHONE (OUTPATIENT)
Dept: GASTROENTEROLOGY | Age: 72
End: 2022-02-12

## 2022-02-12 NOTE — TELEPHONE ENCOUNTER
Vit D level still low-he says he was taking a prep of Vit D3 2000 units or 50 mcg/day for at least 6 weeks  Will double that dose and repeat level in 2-3 months

## 2022-03-15 RX ORDER — SODIUM CHLORIDE 0.9 % (FLUSH) 0.9 %
5-40 SYRINGE (ML) INJECTION PRN
Status: CANCELLED | OUTPATIENT
Start: 2022-05-10

## 2022-03-15 RX ORDER — SODIUM CHLORIDE 9 MG/ML
INJECTION, SOLUTION INTRAVENOUS CONTINUOUS
Status: CANCELLED | OUTPATIENT
Start: 2022-05-10

## 2022-03-15 RX ORDER — SODIUM CHLORIDE 0.9 % (FLUSH) 0.9 %
30 SYRINGE (ML) INJECTION ONCE
Status: CANCELLED | OUTPATIENT
Start: 2022-05-10

## 2022-03-16 ENCOUNTER — HOSPITAL ENCOUNTER (OUTPATIENT)
Dept: INFUSION THERAPY | Age: 72
Setting detail: INFUSION SERIES
Discharge: HOME | End: 2022-03-16
Payer: MEDICARE

## 2022-03-16 VITALS
OXYGEN SATURATION: 97 % | DIASTOLIC BLOOD PRESSURE: 81 MMHG | HEART RATE: 79 BPM | RESPIRATION RATE: 16 BRPM | TEMPERATURE: 97.3 F | SYSTOLIC BLOOD PRESSURE: 131 MMHG

## 2022-03-16 DIAGNOSIS — K50.80 CROHN'S DISEASE OF BOTH SMALL AND LARGE INTESTINE WITHOUT COMPLICATION (HCC): Primary | ICD-10-CM

## 2022-03-16 PROCEDURE — 6360000002 HC RX W HCPCS: Performed by: SPECIALIST

## 2022-03-16 PROCEDURE — 2580000003 HC RX 258: Performed by: SPECIALIST

## 2022-03-16 PROCEDURE — 96365 THER/PROPH/DIAG IV INF INIT: CPT

## 2022-03-16 PROCEDURE — 99211 OFF/OP EST MAY X REQ PHY/QHP: CPT

## 2022-03-16 RX ORDER — SODIUM CHLORIDE 0.9 % (FLUSH) 0.9 %
30 SYRINGE (ML) INJECTION ONCE
Status: COMPLETED | OUTPATIENT
Start: 2022-03-16 | End: 2022-03-16

## 2022-03-16 RX ORDER — SODIUM CHLORIDE 9 MG/ML
INJECTION, SOLUTION INTRAVENOUS CONTINUOUS
Status: DISCONTINUED | OUTPATIENT
Start: 2022-03-16 | End: 2022-03-16

## 2022-03-16 RX ORDER — SODIUM CHLORIDE 0.9 % (FLUSH) 0.9 %
5-40 SYRINGE (ML) INJECTION PRN
Status: DISCONTINUED | OUTPATIENT
Start: 2022-03-16 | End: 2022-03-17 | Stop reason: HOSPADM

## 2022-03-16 RX ADMIN — VEDOLIZUMAB 300 MG: 300 INJECTION, POWDER, LYOPHILIZED, FOR SOLUTION INTRAVENOUS at 09:43

## 2022-03-16 RX ADMIN — SODIUM CHLORIDE, PRESERVATIVE FREE 30 ML: 5 INJECTION INTRAVENOUS at 10:15

## 2022-03-16 RX ADMIN — SODIUM CHLORIDE, PRESERVATIVE FREE 10 ML: 5 INJECTION INTRAVENOUS at 09:41

## 2022-03-16 NOTE — PROGRESS NOTES
Tolerated infusion well. Reviewed discharge instruction, voiced understanding. Copies of AVS given. Pt discharged home. Pt to exit via steady gait.     Orders Placed This Encounter   Medications    DISCONTD: 0.9 % sodium chloride infusion    vedolizumab (ENTYVIO) 300 mg in sodium chloride 0.9 % 250 mL infusion    sodium chloride flush 0.9 % injection 30 mL    sodium chloride flush 0.9 % injection 5-40 mL

## 2022-04-15 ENCOUNTER — TELEPHONE (OUTPATIENT)
Dept: GASTROENTEROLOGY | Age: 72
End: 2022-04-15

## 2022-04-15 RX ORDER — MELATONIN
1000 DAILY
Qty: 30 TABLET | Refills: 5 | Status: CANCELLED | OUTPATIENT
Start: 2022-04-15

## 2022-04-15 NOTE — TELEPHONE ENCOUNTER
Patient is requesting lab orders for his q 3 month lab work. He wants to pick these up-going to [facility].

## 2022-04-22 LAB
ALBUMIN/GLOBULIN RATIO: 2.2 RATIO (ref 0.8–2.6)
ALBUMIN: 4.6 G/DL (ref 3.5–5.2)
ALP BLD-CCNC: 67 U/L (ref 23–144)
ALT SERPL-CCNC: 12 U/L (ref 0–60)
AST SERPL-CCNC: 21 U/L (ref 0–55)
BILIRUB SERPL-MCNC: 0.3 MG/DL (ref 0–1.2)
BUN BLDV-MCNC: 20 MG/DL (ref 3–29)
BUN/CREAT BLD: 11 (ref 7–25)
CALCIUM SERPL-MCNC: 9.8 MG/DL (ref 8.5–10.5)
CHLORIDE BLD-SCNC: 108 MEQ/L (ref 96–110)
CO2: 25 MEQ/L (ref 19–32)
CREAT SERPL-MCNC: 1.8 MG/DL (ref 0.5–1.4)
GLOBULIN: 2.1 G/DL (ref 1.9–3.6)
GLOMERULAR FILTRATION RATE: 39 MLS/MIN/1.73M2
GLUCOSE BLD-MCNC: 94 MG/DL (ref 70–99)
HCT VFR BLD CALC: 40.9 % (ref 37.5–51)
HEMOGLOBIN: 14.2 G/DL (ref 13–17.7)
MCH RBC QN AUTO: 32.9 PG (ref 26–34)
MCHC RBC AUTO-ENTMCNC: 34.7 G/DL (ref 30.7–35.5)
MCV RBC AUTO: 94.7 FL (ref 80–100)
PDW BLD-RTO: 12 %
PLATELET # BLD: 205 K/UL (ref 140–400)
PMV BLD AUTO: 11.4 FL (ref 7.2–11.7)
POTASSIUM SERPL-SCNC: 4.7 MEQ/L (ref 3.4–5.3)
RBC # BLD: 4.32 M/UL (ref 4.14–5.8)
SODIUM BLD-SCNC: 145 MEQ/L (ref 135–148)
STATUS: ABNORMAL
TOTAL PROTEIN: 6.7 G/DL (ref 6–8.3)
VITAMIN D 25-HYDROXY: 21 NG/ML (ref 30–100)
WBC: 6.1 K/UL (ref 3.5–10.9)

## 2022-05-02 ENCOUNTER — OFFICE VISIT (OUTPATIENT)
Dept: GASTROENTEROLOGY | Age: 72
End: 2022-05-02
Payer: MEDICARE

## 2022-05-02 VITALS
HEART RATE: 86 BPM | TEMPERATURE: 97.3 F | WEIGHT: 182.4 LBS | OXYGEN SATURATION: 99 % | HEIGHT: 67 IN | BODY MASS INDEX: 28.63 KG/M2 | SYSTOLIC BLOOD PRESSURE: 126 MMHG | DIASTOLIC BLOOD PRESSURE: 60 MMHG

## 2022-05-02 DIAGNOSIS — K50.814 CROHN'S DISEASE OF BOTH SMALL AND LARGE INTESTINE WITH ABSCESS (HCC): Primary | ICD-10-CM

## 2022-05-02 PROCEDURE — 99214 OFFICE O/P EST MOD 30 MIN: CPT | Performed by: SPECIALIST

## 2022-05-02 PROCEDURE — 1123F ACP DISCUSS/DSCN MKR DOCD: CPT | Performed by: SPECIALIST

## 2022-05-02 PROCEDURE — 4040F PNEUMOC VAC/ADMIN/RCVD: CPT | Performed by: SPECIALIST

## 2022-05-02 PROCEDURE — G8417 CALC BMI ABV UP PARAM F/U: HCPCS | Performed by: SPECIALIST

## 2022-05-02 PROCEDURE — 3017F COLORECTAL CA SCREEN DOC REV: CPT | Performed by: SPECIALIST

## 2022-05-02 PROCEDURE — 1036F TOBACCO NON-USER: CPT | Performed by: SPECIALIST

## 2022-05-02 PROCEDURE — G8427 DOCREV CUR MEDS BY ELIG CLIN: HCPCS | Performed by: SPECIALIST

## 2022-05-02 NOTE — PROGRESS NOTES
Gastroenterology Office Note            Lesley Wayne. Andie AGGARWAL      Subjective:      Patient ID:      Pedro Roger                 1950    CC: follow up for Crohn's    HPI:   Doing well- reports no problems except had a transient blockage in feb that resolved on its own in a couple days  The patient denies abdominal pain,nausea,vomiting, diarrhea, constipation,melena, hematochezia,hematemesis,weight loss or dysphagia. He increased his Vit D3 to 2000 units per day but level is still 21      Review of Systems:    see HPI for positives and pertinent negatives.  All other systems reviewed and are negative     Objective:   PHYSICAL EXAM:    Vitals:  /60 (Site: Left Upper Arm, Position: Sitting, Cuff Size: Medium Adult)   Pulse 86   Temp 97.3 °F (36.3 °C) (Infrared)   Ht 5' 7\" (1.702 m)   Wt 182 lb 6.4 oz (82.7 kg)   SpO2 99%   BMI 28.57 kg/m²   CONSTITUTIONAL: alert    LUNGS:  clear to auscultation  CARDIOVASCULAR:  regular rate and rhythm and no murmur noted  ABDOMEN:  normal bowel sounds, non-distended, non-tender and no masses palpated, no hepatosplenomegaly  EXTREMITIES: no clubbing, cyanosis, or edema    Assessment:      1) Crohn's ileocolitis- S/P extensive resection and ileocolic anastamosis with chronic stricture  2) 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

## 2022-05-04 ENCOUNTER — TELEPHONE (OUTPATIENT)
Dept: GASTROENTEROLOGY | Age: 72
End: 2022-05-04

## 2022-05-04 RX ORDER — ERGOCALCIFEROL 1.25 MG/1
50000 CAPSULE ORAL WEEKLY
Qty: 12 CAPSULE | Refills: 3 | Status: SHIPPED
Start: 2022-05-04 | End: 2022-05-09 | Stop reason: SDUPTHER

## 2022-05-04 NOTE — TELEPHONE ENCOUNTER
PT called stating that he spoke with the pharmacy about the Vitamin D you want him to start taking. He would like to know what the dosage should be. Insurance will not cover 10,000 IU daily. But it will cover 50,000 IU 1X wkly. The pharmacist told him that she can only get the 50,000 IU in Vitamin D-2 not D-3. He is requesting that you write a script for whatever you think his dosage should be and send it to the [facility] on [address] [place] [address].

## 2022-05-09 ENCOUNTER — TELEPHONE (OUTPATIENT)
Dept: GASTROENTEROLOGY | Age: 72
End: 2022-05-09

## 2022-05-09 RX ORDER — ERGOCALCIFEROL 1.25 MG/1
50000 CAPSULE ORAL WEEKLY
Qty: 12 CAPSULE | Refills: 1 | Status: SHIPPED | OUTPATIENT
Start: 2022-05-09 | End: 2022-08-05 | Stop reason: SDUPTHER

## 2022-05-09 NOTE — TELEPHONE ENCOUNTER
Pt was called and notified that the script for:       Vitamin D (ERGOCALCIFEROL) 1.25 MG (39522 UT) CAPS capsule [[id number]]    Dispense Quantity: 12 capsule Refills: 3            Frequency: WEEKLY    Alvina Haines [id number]  [place] , [place] - [address] - F [phone]   [address] [place] [address]   Phone:  [phone]  Fax:  [phone]    Was sent on 5/4/2022

## 2022-05-11 ENCOUNTER — HOSPITAL ENCOUNTER (OUTPATIENT)
Dept: INFUSION THERAPY | Age: 72
Setting detail: INFUSION SERIES
Discharge: HOME | End: 2022-05-11
Payer: MEDICARE

## 2022-05-11 VITALS
TEMPERATURE: 97.3 F | HEART RATE: 82 BPM | OXYGEN SATURATION: 99 % | SYSTOLIC BLOOD PRESSURE: 156 MMHG | DIASTOLIC BLOOD PRESSURE: 88 MMHG | RESPIRATION RATE: 16 BRPM

## 2022-05-11 DIAGNOSIS — K50.80 CROHN'S DISEASE OF BOTH SMALL AND LARGE INTESTINE WITHOUT COMPLICATION (HCC): Primary | ICD-10-CM

## 2022-05-11 PROCEDURE — 2580000003 HC RX 258: Performed by: SPECIALIST

## 2022-05-11 PROCEDURE — 6360000002 HC RX W HCPCS: Performed by: SPECIALIST

## 2022-05-11 PROCEDURE — 99211 OFF/OP EST MAY X REQ PHY/QHP: CPT

## 2022-05-11 PROCEDURE — 96365 THER/PROPH/DIAG IV INF INIT: CPT

## 2022-05-11 RX ORDER — SODIUM CHLORIDE 0.9 % (FLUSH) 0.9 %
5-40 SYRINGE (ML) INJECTION PRN
Status: CANCELLED | OUTPATIENT
Start: 2022-07-06

## 2022-05-11 RX ORDER — SODIUM CHLORIDE 0.9 % (FLUSH) 0.9 %
5-40 SYRINGE (ML) INJECTION PRN
Status: DISCONTINUED | OUTPATIENT
Start: 2022-05-11 | End: 2022-05-12 | Stop reason: HOSPADM

## 2022-05-11 RX ORDER — SODIUM CHLORIDE 0.9 % (FLUSH) 0.9 %
30 SYRINGE (ML) INJECTION ONCE
Status: CANCELLED | OUTPATIENT
Start: 2022-07-06

## 2022-05-11 RX ORDER — SODIUM CHLORIDE 0.9 % (FLUSH) 0.9 %
30 SYRINGE (ML) INJECTION ONCE
Status: COMPLETED | OUTPATIENT
Start: 2022-05-11 | End: 2022-05-11

## 2022-05-11 RX ADMIN — Medication 30 ML: at 10:10

## 2022-05-11 RX ADMIN — Medication 10 ML: at 09:38

## 2022-05-11 RX ADMIN — VEDOLIZUMAB 300 MG: 300 INJECTION, POWDER, LYOPHILIZED, FOR SOLUTION INTRAVENOUS at 09:39

## 2022-05-11 NOTE — PROGRESS NOTES
Pt taken to room 03 for entyvio. Pt oriented to room, call light, bed/chair controls, TV, pt voiced understanding. Plan of care explained to pt, pt voiced understanding.

## 2022-05-16 ENCOUNTER — TELEPHONE (OUTPATIENT)
Dept: GASTROENTEROLOGY | Age: 72
End: 2022-05-16

## 2022-05-17 ENCOUNTER — TELEPHONE (OUTPATIENT)
Dept: GASTROENTEROLOGY | Age: 72
End: 2022-05-17

## 2022-05-17 NOTE — TELEPHONE ENCOUNTER
Pt. Procedure scheduled for 7/12/22 dorene perez. [employer] script faxed to [address]

## 2022-05-19 ENCOUNTER — TELEPHONE (OUTPATIENT)
Dept: CARDIOLOGY CLINIC | Age: 72
End: 2022-05-19

## 2022-05-19 NOTE — TELEPHONE ENCOUNTER
Cardiologist: Dr. Ortega [place]  Surgeon: Dr. Miguel Narvaez  Surgery: Colonoscopy  Anesthesia: Porpofol  Date: 7/12/2022  FAX# [phone]  # [phone]    New Patient appt.   6/7/22  Radha

## 2022-06-07 ENCOUNTER — OFFICE VISIT (OUTPATIENT)
Dept: CARDIOLOGY CLINIC | Age: 72
End: 2022-06-07
Payer: MEDICARE

## 2022-06-07 VITALS
HEART RATE: 76 BPM | WEIGHT: 182.8 LBS | DIASTOLIC BLOOD PRESSURE: 78 MMHG | BODY MASS INDEX: 28.69 KG/M2 | HEIGHT: 67 IN | SYSTOLIC BLOOD PRESSURE: 118 MMHG

## 2022-06-07 DIAGNOSIS — Z01.810 PREOP CARDIOVASCULAR EXAM: ICD-10-CM

## 2022-06-07 DIAGNOSIS — R06.09 DOE (DYSPNEA ON EXERTION): ICD-10-CM

## 2022-06-07 DIAGNOSIS — K50.814 CROHN'S DISEASE OF BOTH SMALL AND LARGE INTESTINE WITH ABSCESS (HCC): ICD-10-CM

## 2022-06-07 DIAGNOSIS — F41.9 ANXIETY: Primary | ICD-10-CM

## 2022-06-07 PROCEDURE — 99204 OFFICE O/P NEW MOD 45 MIN: CPT | Performed by: INTERNAL MEDICINE

## 2022-06-07 PROCEDURE — 3017F COLORECTAL CA SCREEN DOC REV: CPT | Performed by: INTERNAL MEDICINE

## 2022-06-07 PROCEDURE — G8427 DOCREV CUR MEDS BY ELIG CLIN: HCPCS | Performed by: INTERNAL MEDICINE

## 2022-06-07 PROCEDURE — 93000 ELECTROCARDIOGRAM COMPLETE: CPT | Performed by: INTERNAL MEDICINE

## 2022-06-07 PROCEDURE — 1123F ACP DISCUSS/DSCN MKR DOCD: CPT | Performed by: INTERNAL MEDICINE

## 2022-06-07 PROCEDURE — G8417 CALC BMI ABV UP PARAM F/U: HCPCS | Performed by: INTERNAL MEDICINE

## 2022-06-07 PROCEDURE — 1036F TOBACCO NON-USER: CPT | Performed by: INTERNAL MEDICINE

## 2022-06-07 RX ORDER — ESOMEPRAZOLE MAGNESIUM 20 MG/1
20 FOR SUSPENSION ORAL DAILY
COMMUNITY

## 2022-06-07 NOTE — PROGRESS NOTES
Isra Allen MD                                  CARDIOLOGY  NOTE           Chief Complaint:    Chief Complaint   Patient presents with    New Patient     [address] Pt denies other cardiac symptoms    Cardiac Clearance        HPI:     Rupert Nowak is a 66y.o. year old male who presents to establish care    Patient has prior medical history significant for Crohn's disease s/p colon resection at [facility] clinic in the past.  Currently follows up with Dr. Gali Henderson, and is referred for preop clearance. Patient is to undergo colonoscopy for evaluation. It is unclear patient will go with conscious sedation or general anesthesia. Upon questioning, patient denies any chest pain. Has mild exertional dyspnea. Denies any palpitations. On a routine basis patient is able to carry out 7 METS without difficulty. EKG shows sinus rhythm    Current Outpatient Medications   Medication Sig Dispense Refill    esomeprazole Magnesium (NEXIUM) 20 MG PACK Take 20 mg by mouth daily      vitamin D (ERGOCALCIFEROL) 1.25 MG (49506 UT) CAPS capsule Take 1 capsule by mouth once a week 12 capsule 1    diphenoxylate-atropine (LOMOTIL) 2.5-0.025 MG per tablet Take 2 tablets by mouth 2 times daily for 180 days.  (Patient taking differently: Take 2 tablets by mouth daily. ) 120 tablet 5    vitamin D3 (CHOLECALCIFEROL) 25 MCG (1000 UT) TABS tablet Take 1 tablet by mouth daily (Patient taking differently: Take 50,000 Units by mouth daily ) 30 tablet 5    ENTYVIO 300 MG injection MD to administer 300mg IV every 8 weeks 2 each 5    prednisoLONE acetate (PRED FORTE) 1 % ophthalmic suspension Place 1 drop into the left eye daily      fenofibrate (TRICOR) 145 MG tablet Take 145 mg by mouth daily      sertraline (ZOLOFT) 100 MG tablet Take 1 tablet by mouth daily 90 tablet 1    Multiple Vitamins-Iron (MULTI-VITAMIN/IRON PO) Take by mouth      folic acid (FOLVITE) 1 MG tablet Take 1 mg by mouth daily      loperamide (IMODIUM A-D) 2 MG tablet Take 1 tablet by mouth 4 times daily (Patient not taking: Reported on 5/2/2022) 360 tablet 3     No current facility-administered medications for this visit. Allergies:     Imuran [azathioprine] and Sulfa antibiotics    Patient History:    Past Medical History:   Diagnosis Date    Acute renal failure with tubular necrosis ([facility] [address] 7/5/2021    Anxiety, generalized     Crohn's disease ([facility] [address]     Crohn's disease of small and large intestines with complication ([facility] [address] 7/0/5772    Crohn's disease without complication ([facility] [address] 9/94/5665    Depression     GERD (gastroesophageal reflux disease)     Hepatitis     hepatitis when a child    High cholesterol     Liver disease     Psychiatric problem      Past Surgical History:   Procedure Laterality Date    ABDOMEN SURGERY      COLONOSCOPY      DILATATION, ESOPHAGUS      ENDOSCOPY, COLON, DIAGNOSTIC      EYE SURGERY      cataract    SMALL INTESTINE SURGERY      SMALL INTESTINE SURGERY  12/2015    TONSILLECTOMY      childhood     Family History   Problem Relation Age of Onset    Arthritis Mother     Heart Disease Mother     Stroke Mother     Cancer Father     Depression Father     Hearing Loss Father     Arthritis Sister     Depression Sister     Anxiety Disorder Other     Other Other      Social History     Tobacco Use    Smoking status: Never Smoker    Smokeless tobacco: Former User   Substance Use Topics    Alcohol use: Yes        Review of Systems:     · Constitutional:  No Fever or Weight Loss   · Eyes: No Decreased Vision  · ENT: No Headaches, Hearing Loss or Vertigo  · Cardiovascular: No Chest Pain,  No Shortness of breath, No Palpitations. No Edema   · Respiratory: No cough or wheezing .  No Respiratory distress   · Gastrointestinal: No abdominal pain, appetite loss, blood in stools, constipation, diarrhea or heartburn  · Genitourinary: No dysuria, trouble voiding, or hematuria  · Musculoskeletal:  denies any new  joint aches , or pain · Integumentary: No rash or pruritis  · Neurological: No TIA or stroke symptoms  · Psychiatric: No anxiety or depression  · Endocrine: No malaise, fatigue or temperature intolerance  · Hematologic/Lymphatic: No bleeding problems, blood clots or swollen lymph nodes  · Allergic/Immunologic: No nasal congestion or hives        Objective:      Physical Exam:    /78 (Site: Left Upper Arm, Position: Sitting, Cuff Size: Large Adult)   Pulse 76   Ht 5' 7\" (1.702 m)   Wt 182 lb 12.8 oz (82.9 kg)   BMI 28.63 kg/m²   Wt Readings from Last 3 Encounters:   06/07/22 182 lb 12.8 oz (82.9 kg)   05/02/22 182 lb 6.4 oz (82.7 kg)   02/16/22 182 lb (82.6 kg)     Body mass index is 28.63 kg/m². Vitals:    06/07/22 1313   BP: 118/78   Pulse: 76        General Appearance and Constitutional: Conversant, Well developed, Well nourished, No acute distress, Non-toxic appearance. HEENT:  Normocephalic, Atraumatic, Bilateral external ears normal, Oropharynx moist, No oral exudates,   Nose normal.   Neck- Normal range of motion, No tenderness, Supple  Eyes:  EOMI, Conjunctiva normal, No discharge. Respiratory:  Normal breath sounds, No respiratory distress, No wheezing, No Rales, No Ronchi. No chest tenderness. Cardiovascular: S1-S2, no added heart sounds, No Mumurs appreciated. No gallops, rubs. No Pedal Edema   GI:  Bowel sounds normal, Soft, No tenderness,  :  No costovertebral angle tenderness   Musculoskeletal:  No gross deformities.  Back- No tenderness  Integument:  Well hydrated, no rash   Lymphatic:  No lymphadenopathy noted   Neurologic:  Alert & oriented x 3, Normal motor function, normal sensory function, no focal deficits noted   Psychiatric:  Speech and behavior appropriate       Medical decision making and Data review:    DATA:    No results found for: TROPONINT  BNP:  No results found for: PROBNP  PT/INR:  No results found for: PTINR  No results found for: LABA1C  Lab Results   Component Value Date    CHOL 198 01/13/2017    TRIG 325 01/13/2017    HDL 49 01/13/2017    LDLCALC 84 01/13/2017     Lab Results   Component Value Date    WBC 6.1 04/22/2022    HGB 14.2 04/22/2022    HCT 40.9 04/22/2022    MCV 94.7 04/22/2022     04/22/2022     TSH:   Lab Results   Component Value Date    TSH 2.6 01/13/2017     Lab Results   Component Value Date    AST 21 04/22/2022    ALT 12 04/22/2022    BILITOT 0.3 04/22/2022    ALKPHOS 67 04/22/2022         All labs, medications and tests reviewed by myself including data and history from outside source , patient and available family . 1. Anxiety    2. Preop cardiovascular exam    3. Crohn's disease of both small and large intestine with abscess ([facility] [address]    4. HUTSON (dyspnea on exertion)         Impression and Plan:      1. Crohn's disease  2. Preop cardiac risk assessment:    Patient is fairly low risk for coronary disease. Obtain exercise stress test for risk stratification  Obtain echocardiogram to rule out structural heart disease    Follow-up in 2 weeks      Return in about 3 weeks (around 6/28/2022). Counseled extensively and medication compliance urged. We discussed that for the  prevention of ASCVD our  goal is aggressive risk modification. Patient is encouraged to exercise even a brisk walk for 30 minutes  at least 3 to 4 times a week   Various goals were discussed and questions answered. Continue current medications. Appropriate prescriptions are addressed and refills ordered. Questions answered and patient verbalizes understanding. Call for any problems, questions, or concerns.

## 2022-06-07 NOTE — LETTER
[facility]    Dr. Rodolfo Edwards  1950  [id number]    Have you had any Chest Pain that is not new? - No  Have you had any Shortness of Breath - No  If Yes - When on exertion  Have you had any dizziness - No  Have you had any palpitations that are not new? - No  Vein \"LEG PROBLEM Questionnaire\"  1. Do you have prominent leg veins? No   2. Do you have any skin discoloration? No  3. Do you have any healed or active sores? No  4. Do you have swelling of the legs? No  5. Do you have a family history of varicose veins? No  6. Does your profession involve pro-longed        standing or heavy lifting? retired  9. Have you been fighting overweight problems? No  8. Do you have restless legs? No  9. Do you have any night time cramps?  sometimes  10. Do you have any of the following in your legs:        once in a while     When did you have your last labs drawn 04/22   [facility] you have them [facility]   What doctor ordered Dr. Miguel Narvaez    If we do not have these labs you are retrieve these labs for these providers! Do you have a surgery or procedure scheduled in the near future - Yes  If Yes- DO EKG  If Yes - Who is the surgery with?  Dr. Miguel Narvaez  Phone number of physician   Fax number of physician   Type of surgery Colonoscopy  GIVE THIS INFORMATION TO JOSE DORANTES     Ask patient if they want to sign up for MyChart if they are not already signed up     Check to see if we have an E-MAIL on file for the patient     Check medication list thoroughly!!! AND RECONCILE OUTSIDE MEDICATIONS  If dose has changed change the entire order not just the MG  BE SURE TO ASK PATIENT IF [address] At check out add to every patient's \"wrap up\" the following dot phrase AFTERHOURSEDUCATION and ensure we explain this to our patients

## 2022-06-07 NOTE — PATIENT INSTRUCTIONS
Please be informed that if you contact our office outside of normal business hours the physician on call cannot help with any scheduling or rescheduling issues, procedure instruction questions or any type of medication issue. We advise you for any urgent/emergency that you go to the nearest emergency room! PLEASE CALL OUR OFFICE DURING NORMAL BUSINESS HOURS    Monday - Friday   8 am to 5 pm    [address]: [phone]    [place]:  [phone]    **It is YOUR responsibilty to bring medication bottles and/or updated medication list to [address] [place] [address]. This will allow us to better serve you and all your healthcare needs**  Please hold on to these instructions the  will call you within 1-9 business days when we receive authorization from your insurance. Echocardiogram    WHAT TO EXPECT:   ? This test will take approximately 45 minutes. ? It is an ultrasound of the heart. ? It can look at the valves and chambers inside the heart   ? There is no special instructions for this test.     If you are unable to keep this appointment, please notify us 24 hours prior to test at [phone]. Please hold on to these instructions the  will call you within 1-9 business days when we receive authorization from your insurance. Treadmill Stress test    WHAT TO EXPECT:     The exercise stress test is a test used to provide information about how the heart responds to exertion. It involves walking on a treadmill at increasing levels of difficulty, while electrocardiogram, heart rate, and blood pressure are monitored. ? This test will take approximately 1 hours: Please arrive at the office 5-10 min before the scheduled testing time. ? Once you are taken back to the stress lab you will be asked to read and sign a consent form before proceeding with the test. At this time feel free to ask any question that you may have as the procedure is explained to you.   ?  You will be attached
Spontaneous, unlabored and symmetrical

## 2022-06-21 ENCOUNTER — PROCEDURE VISIT (OUTPATIENT)
Dept: CARDIOLOGY CLINIC | Age: 72
End: 2022-06-21

## 2022-06-21 ENCOUNTER — PROCEDURE VISIT (OUTPATIENT)
Dept: CARDIOLOGY CLINIC | Age: 72
End: 2022-06-21
Payer: MEDICARE

## 2022-06-21 DIAGNOSIS — Z01.810 PREOP CARDIOVASCULAR EXAM: ICD-10-CM

## 2022-06-21 DIAGNOSIS — R06.09 DOE (DYSPNEA ON EXERTION): ICD-10-CM

## 2022-06-21 DIAGNOSIS — Z01.818 PRE-OP EVALUATION: ICD-10-CM

## 2022-06-21 DIAGNOSIS — K50.814 CROHN'S DISEASE OF BOTH SMALL AND LARGE INTESTINE WITH ABSCESS (HCC): ICD-10-CM

## 2022-06-21 DIAGNOSIS — F41.9 ANXIETY: ICD-10-CM

## 2022-06-21 LAB
LV EF: 58 %
LVEF MODALITY: NORMAL

## 2022-06-21 PROCEDURE — 93015 CV STRESS TEST SUPVJ I&R: CPT | Performed by: INTERNAL MEDICINE

## 2022-06-21 PROCEDURE — 93306 TTE W/DOPPLER COMPLETE: CPT | Performed by: INTERNAL MEDICINE

## 2022-06-24 ENCOUNTER — TELEPHONE (OUTPATIENT)
Dept: CARDIOLOGY CLINIC | Age: 72
End: 2022-06-24

## 2022-06-24 NOTE — TELEPHONE ENCOUNTER
06/22/2022  ECHO     Summary   Left ventricular function and size is normal, EF is estimated at 55-60%. Mild left ventricular hypertrophy. Normal diastolic filling pattern for age. No significant valvular disease noted. Moderate aortic regurgitation; PHT: 404msec. Dilation of ascending aorta(3.8cm) . No evidence of pericardial effusion.       LM FOR PT TO CALL OFFICE   LMFPTCO    Pt notified of results

## 2022-06-30 ENCOUNTER — OFFICE VISIT (OUTPATIENT)
Dept: CARDIOLOGY CLINIC | Age: 72
End: 2022-06-30
Payer: MEDICARE

## 2022-06-30 VITALS
WEIGHT: 181 LBS | SYSTOLIC BLOOD PRESSURE: 132 MMHG | HEIGHT: 67 IN | BODY MASS INDEX: 28.41 KG/M2 | HEART RATE: 60 BPM | DIASTOLIC BLOOD PRESSURE: 80 MMHG

## 2022-06-30 DIAGNOSIS — Z01.810 PREOP CARDIOVASCULAR EXAM: ICD-10-CM

## 2022-06-30 DIAGNOSIS — I10 ESSENTIAL HYPERTENSION: ICD-10-CM

## 2022-06-30 DIAGNOSIS — I38 VALVULAR HEART DISEASE: ICD-10-CM

## 2022-06-30 DIAGNOSIS — E78.5 DYSLIPIDEMIA: Primary | ICD-10-CM

## 2022-06-30 PROCEDURE — 99214 OFFICE O/P EST MOD 30 MIN: CPT | Performed by: INTERNAL MEDICINE

## 2022-06-30 PROCEDURE — G8417 CALC BMI ABV UP PARAM F/U: HCPCS | Performed by: INTERNAL MEDICINE

## 2022-06-30 PROCEDURE — 1036F TOBACCO NON-USER: CPT | Performed by: INTERNAL MEDICINE

## 2022-06-30 PROCEDURE — 1123F ACP DISCUSS/DSCN MKR DOCD: CPT | Performed by: INTERNAL MEDICINE

## 2022-06-30 PROCEDURE — G8428 CUR MEDS NOT DOCUMENT: HCPCS | Performed by: INTERNAL MEDICINE

## 2022-06-30 PROCEDURE — 3017F COLORECTAL CA SCREEN DOC REV: CPT | Performed by: INTERNAL MEDICINE

## 2022-06-30 RX ORDER — AMLODIPINE BESYLATE 2.5 MG/1
2.5 TABLET ORAL DAILY
Qty: 30 TABLET | Refills: 3 | Status: SHIPPED | OUTPATIENT
Start: 2022-06-30

## 2022-06-30 NOTE — PROGRESS NOTES
daily ) 30 tablet 5    loperamide (IMODIUM A-D) 2 MG tablet Take 1 tablet by mouth 4 times daily (Patient not taking: Reported on 5/2/2022) 360 tablet 3    ENTYVIO 300 MG injection MD to administer 300mg IV every 8 weeks 2 each 5    prednisoLONE acetate (PRED FORTE) 1 % ophthalmic suspension Place 1 drop into the left eye daily      fenofibrate (TRICOR) 145 MG tablet Take 145 mg by mouth daily      sertraline (ZOLOFT) 100 MG tablet Take 1 tablet by mouth daily 90 tablet 1    Multiple Vitamins-Iron (MULTI-VITAMIN/IRON PO) Take by mouth      folic acid (FOLVITE) 1 MG tablet Take 1 mg by mouth daily       No current facility-administered medications for this visit. Allergies:     Imuran [azathioprine] and Sulfa antibiotics    Patient History:    Past Medical History:   Diagnosis Date    Acute renal failure with tubular necrosis ([facility] [address] 07/05/2021    Anxiety, generalized     Crohn's disease ([facility] [address]     Crohn's disease of small and large intestines with complication ([facility] [address] 47/30/3351    Crohn's disease without complication ([facility] [address] 07/46/0205    Depression     GERD (gastroesophageal reflux disease)     Hepatitis     hepatitis when a child    High cholesterol     Hx of echocardiogram 06/22/2022    No significant valvular disease noted. Dilation of ascending aorta(3.8cm) . Normal diastolic filling pattern for age.     Liver disease     Psychiatric problem      Past Surgical History:   Procedure Laterality Date    ABDOMEN SURGERY      COLONOSCOPY      DILATATION, ESOPHAGUS      ENDOSCOPY, COLON, DIAGNOSTIC      EYE SURGERY      cataract    SMALL INTESTINE SURGERY      SMALL INTESTINE SURGERY  12/2015    TONSILLECTOMY      childhood     Family History   Problem Relation Age of Onset    Arthritis Mother     Heart Disease Mother     Stroke Mother     Cancer Father     Depression Father     Hearing Loss Father     Arthritis Sister     Depression Sister     Anxiety Disorder Other     Other Other Soft, No tenderness,  :  No costovertebral angle tenderness   Musculoskeletal:  No gross deformities. Back- No tenderness  Integument:  Well hydrated, no rash   Lymphatic:  No lymphadenopathy noted   Neurologic:  Alert & oriented x 3, Normal motor function, normal sensory function, no focal deficits noted   Psychiatric:  Speech and behavior appropriate       Medical decision making and Data review:    DATA:    No results found for: TROPONINT  BNP:  No results found for: PROBNP  PT/INR:  No results found for: PTINR  No results found for: LABA1C  Lab Results   Component Value Date    CHOL 198 01/13/2017    TRIG 325 01/13/2017    HDL 49 01/13/2017    LDLCALC 84 01/13/2017     Lab Results   Component Value Date    WBC 6.1 04/22/2022    HGB 14.2 04/22/2022    HCT 40.9 04/22/2022    MCV 94.7 04/22/2022     04/22/2022     TSH:   Lab Results   Component Value Date    TSH 2.6 01/13/2017     Lab Results   Component Value Date    AST 21 04/22/2022    ALT 12 04/22/2022    BILITOT 0.3 04/22/2022    ALKPHOS 67 04/22/2022         All labs, medications and tests reviewed by myself including data and history from outside source , patient and available family . 1. Dyslipidemia    2. Essential hypertension    3. Preop cardiovascular exam    4. Valvular heart disease         Impression and Plan:      1. Essential hypertension  2. Left ventricular hypertrophy, mild, noted on echo. 3. Moderate aortic regurgitation    Patient on Norvasc 2.5 mg p.o. daily  Advised to check blood pressure routinely at home, if remains elevated, Norvasc can be uptitrated. 4. CKD: Stage III. Follow-up with nephrology, Dr. Karlee Mccoy  5. Hyperlipidemia: Continue with fenofibrate 145 mg daily  6. Crohn's disease: Per GI     7. Preop cardiac risk assessment: (Colonoscopy)     Patient is fairly low risk for coronary disease. Exercise stress test negative for ischemia  Echocardiogram fairly unremarkable except mild LVH.     Patient is deemed low risk for low risk surgery/colonoscopy. Okay to proceed  from cardiology standpoint. Return in about 1 year (around 6/30/2023). Counseled extensively and medication compliance urged. We discussed that for the  prevention of ASCVD our  goal is aggressive risk modification. Patient is encouraged to exercise even a brisk walk for 30 minutes  at least 3 to 4 times a week   Various goals were discussed and questions answered. Continue current medications. Appropriate prescriptions are addressed and refills ordered. Questions answered and patient verbalizes understanding. Call for any problems, questions, or concerns.

## 2022-07-06 ENCOUNTER — HOSPITAL ENCOUNTER (OUTPATIENT)
Dept: INFUSION THERAPY | Age: 72
Setting detail: INFUSION SERIES
Discharge: HOME | End: 2022-07-06
Payer: MEDICARE

## 2022-07-06 VITALS
HEART RATE: 63 BPM | SYSTOLIC BLOOD PRESSURE: 150 MMHG | TEMPERATURE: 97.3 F | OXYGEN SATURATION: 97 % | RESPIRATION RATE: 16 BRPM | DIASTOLIC BLOOD PRESSURE: 79 MMHG

## 2022-07-06 DIAGNOSIS — K50.80 CROHN'S DISEASE OF BOTH SMALL AND LARGE INTESTINE WITHOUT COMPLICATION (HCC): Primary | ICD-10-CM

## 2022-07-06 PROCEDURE — 6360000002 HC RX W HCPCS: Performed by: SPECIALIST

## 2022-07-06 PROCEDURE — 99211 OFF/OP EST MAY X REQ PHY/QHP: CPT

## 2022-07-06 PROCEDURE — 96365 THER/PROPH/DIAG IV INF INIT: CPT

## 2022-07-06 PROCEDURE — 2580000003 HC RX 258: Performed by: SPECIALIST

## 2022-07-06 RX ORDER — SODIUM CHLORIDE 0.9 % (FLUSH) 0.9 %
30 SYRINGE (ML) INJECTION ONCE
Status: CANCELLED | OUTPATIENT
Start: 2022-08-31

## 2022-07-06 RX ORDER — SODIUM CHLORIDE 0.9 % (FLUSH) 0.9 %
30 SYRINGE (ML) INJECTION ONCE
Status: COMPLETED | OUTPATIENT
Start: 2022-07-06 | End: 2022-07-06

## 2022-07-06 RX ORDER — SODIUM CHLORIDE 0.9 % (FLUSH) 0.9 %
5-40 SYRINGE (ML) INJECTION PRN
Status: CANCELLED | OUTPATIENT
Start: 2022-08-31

## 2022-07-06 RX ORDER — SODIUM CHLORIDE 0.9 % (FLUSH) 0.9 %
5-40 SYRINGE (ML) INJECTION PRN
Status: DISCONTINUED | OUTPATIENT
Start: 2022-07-06 | End: 2022-07-07 | Stop reason: HOSPADM

## 2022-07-06 RX ADMIN — VEDOLIZUMAB 300 MG: 300 INJECTION, POWDER, LYOPHILIZED, FOR SOLUTION INTRAVENOUS at 10:12

## 2022-07-06 RX ADMIN — SODIUM CHLORIDE, PRESERVATIVE FREE 10 ML: 5 INJECTION INTRAVENOUS at 10:10

## 2022-07-06 RX ADMIN — SODIUM CHLORIDE, PRESERVATIVE FREE 30 ML: 5 INJECTION INTRAVENOUS at 10:46

## 2022-07-06 NOTE — PROGRESS NOTES
Tolerated Entyvio well. Reviewed discharge instruction, voiced understanding. Copies of AVS given. Pt discharged home. Pt to exit via ambulation.     Orders Placed This Encounter   Medications    vedolizumab (ENTYVIO) 300 mg in sodium chloride 0.9 % 250 mL infusion    sodium chloride flush 0.9 % injection 30 mL    sodium chloride flush 0.9 % injection 5-40 mL

## 2022-07-08 NOTE — PROGRESS NOTES
Patient will be called on 7/11/2022 for his procedure at [facility] on 7/12/2022. 1. Do not eat or drink anything after 12 midnight (or____hours) unless instructed by your doctor prior to surgery. This includes no water, chewing gum or mints. NO chewing tobacco.  2. Follow your directions as prescribed by the doctor for your procedure and medications. 3.Check with your Doctor regarding stopping Plavix, Coumadin, Lovenox,Effient,Pradaxa,Xarelto, Fragmin or other blood thinners and follow their instructions. 4. Do not smoke, and do not drink any alcoholic beverages 24 hours prior to surgery. This includes NA Beer. 5. You may brush your teeth and gargle the morning of surgery. DO NOT SWALLOW WATER  6. You MUST make arrangements for a responsible adult to take you home after your surgery and be able to check on you every couple hours for the day. You will not be allowed     to leave alone or drive yourself home. It is strongly suggested someone stay with you the first 24 hrs. Your surgery will be cancelled if you do not have a ride home. 7. Please wear simple, loose fitting clothing to the hospital.  Danielle Coma not bring valuables (money, credit cards, checkbooks, etc.) Do not wear any makeup (including no eye makeup) or nail polish on your fingers or toes. 8. DO NOT wear any jewelry or piercings on day of surgery. All body piercing jewelry must be removed  9. If you have dentures, they will be removed before going to the OR; we will provide you a container. If you wear contact lenses or glasses, they will be removed; please bring a case for them. 10. If you  have a Living Will and Durable Power of  for Healthcare, please bring in a copy. 11 Please bring picture ID,  insurance card, paperwork from the doctors office    (H & P, Consent, & card for implantable devices). Patient will take his norvasc and nexium the morning of his procedure. Patient voiced understanding concerning his colon prep instructions and had no questions at this time.

## 2022-07-10 ENCOUNTER — ANESTHESIA EVENT (OUTPATIENT)
Dept: OPERATING ROOM | Age: 72
End: 2022-07-10
Payer: MEDICARE

## 2022-07-10 NOTE — ANESTHESIA PRE PROCEDURE
Department of Anesthesiology  Preprocedure Note       Name:  Shaneka Staley   Age:  66 y.o.  :  1950                                          MRN:  [id number]         Date:  7/10/2022      Surgeon: Guillermina Maldonado):  Nish Grier MD    Procedure: Procedure(s):  COLONOSCOPY DIAGNOSTIC    Medications prior to admission:   Prior to Admission medications    Medication Sig Start Date End Date Taking? Authorizing Provider   amLODIPine (NORVASC) 2.5 MG tablet Take 1 tablet by mouth daily 22   Fortino Dixon MD   esomeprazole Magnesium (NEXIUM) 20 MG PACK Take 20 mg by mouth daily    Historical Provider, MD   vitamin D (ERGOCALCIFEROL) 1.25 MG (82549 UT) CAPS capsule Take 1 capsule by mouth once a week 22   Nish Grier MD   diphenoxylate-atropine (LOMOTIL) 2.5-0.025 MG per tablet Take 2 tablets by mouth 2 times daily for 180 days. Patient taking differently: Take 2 tablets by mouth daily.   22  Nish Grier MD   vitamin D3 (CHOLECALCIFEROL) 25 MCG (1000 UT) TABS tablet Take 1 tablet by mouth daily  Patient taking differently: Take 50,000 Units by mouth daily  21   Nish Grier MD   loperamide (IMODIUM A-D) 2 MG tablet Take 1 tablet by mouth 4 times daily  Patient not taking: Reported on 2022 10/18/21   Nish Grier MD   ENTYVIO 300 MG injection MD to administer 300mg IV every 8 weeks 21   Nish Grier MD   prednisoLONE acetate (PRED FORTE) 1 % ophthalmic suspension Place 1 drop into the left eye daily    Historical Provider, MD   fenofibrate (TRICOR) 145 MG tablet Take 145 mg by mouth daily 21   Historical Provider, MD   sertraline (ZOLOFT) 100 MG tablet Take 1 tablet by mouth daily 17   Luciano Kan PA-C   Multiple Vitamins-Iron (MULTI-VITAMIN/IRON PO) Take by mouth    Historical Provider, MD   folic acid (FOLVITE) 1 MG tablet Take 1 mg by mouth daily    Historical Provider, MD       Current medications:    No current facility-administered medications for this encounter. Current Outpatient Medications   Medication Sig Dispense Refill    amLODIPine (NORVASC) 2.5 MG tablet Take 1 tablet by mouth daily 30 tablet 3    esomeprazole Magnesium (NEXIUM) 20 MG PACK Take 20 mg by mouth daily      vitamin D (ERGOCALCIFEROL) 1.25 MG (74438 UT) CAPS capsule Take 1 capsule by mouth once a week 12 capsule 1    diphenoxylate-atropine (LOMOTIL) 2.5-0.025 MG per tablet Take 2 tablets by mouth 2 times daily for 180 days. (Patient taking differently: Take 2 tablets by mouth daily. ) 120 tablet 5    vitamin D3 (CHOLECALCIFEROL) 25 MCG (1000 UT) TABS tablet Take 1 tablet by mouth daily (Patient taking differently: Take 50,000 Units by mouth daily ) 30 tablet 5    loperamide (IMODIUM A-D) 2 MG tablet Take 1 tablet by mouth 4 times daily (Patient not taking: Reported on 5/2/2022) 360 tablet 3    ENTYVIO 300 MG injection MD to administer 300mg IV every 8 weeks 2 each 5    prednisoLONE acetate (PRED FORTE) 1 % ophthalmic suspension Place 1 drop into the left eye daily      fenofibrate (TRICOR) 145 MG tablet Take 145 mg by mouth daily      sertraline (ZOLOFT) 100 MG tablet Take 1 tablet by mouth daily 90 tablet 1    Multiple Vitamins-Iron (MULTI-VITAMIN/IRON PO) Take by mouth      folic acid (FOLVITE) 1 MG tablet Take 1 mg by mouth daily         Allergies:     Allergies   Allergen Reactions    Imuran [Azathioprine]     Sulfa Antibiotics        Problem List:    Patient Active Problem List   Diagnosis Code    Crohn's disease of both small and large intestine with abscess ([facility] K50.814    SBO (small bowel obstruction) ([address] K56.609    Crohn's disease of both small and large intestine with intestinal obstruction ([address] K50.812    Osteoporosis M81.0    Crohn's disease of both small and large intestine without complication ([address] U87.82    Crohn's disease without complication ([address] U78.79    Crohn's disease of small and large intestines with complication ([address] K50.819    Anxiety F41.9    Acute renal failure with tubular necrosis (HCC) N17.0    Stage 3a chronic kidney disease (HCC) N18.31    High triglycerides E78.1       Past Medical History:        Diagnosis Date    Acute renal failure with tubular necrosis ([facility] 07/05/2021    Anxiety, generalized     Crohn's disease ([facility]     [facility]'s disease of small and large intestines with complication [facility] 07/82/3423    Crohn's disease without complication [facility] 92/58/2647    Depression     GERD (gastroesophageal reflux disease)     Hepatitis     hepatitis when a child    High cholesterol     Hx of echocardiogram 06/22/2022    No significant valvular disease noted. Dilation of ascending aorta(3.8cm) . Normal diastolic filling pattern for age.  Liver disease     Psychiatric problem        Past Surgical History:        Procedure Laterality Date    ABDOMEN SURGERY      bowel obstruction times three    COLONOSCOPY      DILATATION, ESOPHAGUS      ENDOSCOPY, COLON, DIAGNOSTIC      EYE SURGERY      cataract    SMALL INTESTINE SURGERY      SMALL INTESTINE SURGERY  12/2015    TONSILLECTOMY      childhood       Social History:    Social History     Tobacco Use    Smoking status: Never Smoker    Smokeless tobacco: Former User   Substance Use Topics    Alcohol use: Yes     Comment: rare                                Counseling given: Not Answered      Vital Signs (Current):   Vitals:    07/08/22 0903   Weight: 180 lb (81.6 kg)   Height: 5' 7\" (1.702 m)                                              BP Readings from Last 3 Encounters:   07/06/22 (!) 150/79   06/30/22 132/80   06/07/22 118/78       NPO Status:                                                                                 BMI:   Wt Readings from Last 3 Encounters:   06/30/22 181 lb (82.1 kg)   06/07/22 182 lb 12.8 oz (82.9 kg)   05/02/22 182 lb 6.4 oz (82.7 kg)     Body mass index is 28.19 kg/m².     CBC:   Lab Results   Component Value Date/Time and size is normal, EF is estimated at 55-60%. Mild left ventricular hypertrophy. Normal diastolic filling pattern for age. No significant valvular disease noted. Moderate aortic regurgitation; PHT: 404msec. Dilation of ascending aorta(3.8cm) . No evidence of pericardial effusion. Stress test 6/2022:     Normal exercise stress test without any ischemic changes   Good functional capacity      Patient is deemed low risk for low risk surgery/colonoscopy. Okay to proceed   from cardiology standpoint. Neuro/Psych:   (+) depression/anxiety             GI/Hepatic/Renal:   (+) GERD: well controlled, hepatitis:, liver disease:, renal disease: CRI, bowel prep,           Endo/Other: Negative Endo/Other ROS                    Abdominal:             Vascular: negative vascular ROS. Other Findings:           Anesthesia Plan      MAC     ASA 2       Induction: intravenous. Anesthetic plan and risks discussed with patient. Plan discussed with CRNA. GUMARO Starks - CRNA   7/10/2022       Pre Anesthesia Assessment complete.  Chart reviewed on 7/10/2022

## 2022-07-11 NOTE — PROGRESS NOTES
Spoke with patient and he will arrive at [facility] at 1300 on 7/12/2022 for his procedure at 1430.

## 2022-07-12 ENCOUNTER — HOSPITAL ENCOUNTER (OUTPATIENT)
Age: 72
Setting detail: OUTPATIENT SURGERY
Discharge: HOME | End: 2022-07-12
Attending: SPECIALIST | Admitting: SPECIALIST
Payer: MEDICARE

## 2022-07-12 ENCOUNTER — ANESTHESIA (OUTPATIENT)
Dept: OPERATING ROOM | Age: 72
End: 2022-07-12
Payer: MEDICARE

## 2022-07-12 VITALS
TEMPERATURE: 98.9 F | SYSTOLIC BLOOD PRESSURE: 141 MMHG | HEART RATE: 61 BPM | WEIGHT: 175.8 LBS | RESPIRATION RATE: 16 BRPM | OXYGEN SATURATION: 99 % | BODY MASS INDEX: 27.59 KG/M2 | DIASTOLIC BLOOD PRESSURE: 71 MMHG | HEIGHT: 67 IN

## 2022-07-12 DIAGNOSIS — Z90.49 HISTORY OF COLON RESECTION: ICD-10-CM

## 2022-07-12 DIAGNOSIS — K56.609 SMALL BOWEL OBSTRUCTION (HCC): ICD-10-CM

## 2022-07-12 DIAGNOSIS — K50.819 CROHN'S DISEASE OF BOTH SMALL AND LARGE INTESTINE WITH COMPLICATION (HCC): ICD-10-CM

## 2022-07-12 PROCEDURE — 2580000003 HC RX 258: Performed by: SPECIALIST

## 2022-07-12 PROCEDURE — 2709999900 HC NON-CHARGEABLE SUPPLY: Performed by: SPECIALIST

## 2022-07-12 PROCEDURE — 7100000010 HC PHASE II RECOVERY - FIRST 15 MIN: Performed by: SPECIALIST

## 2022-07-12 PROCEDURE — 7100000011 HC PHASE II RECOVERY - ADDTL 15 MIN: Performed by: SPECIALIST

## 2022-07-12 PROCEDURE — 45331 SIGMOIDOSCOPY AND BIOPSY: CPT | Performed by: SPECIALIST

## 2022-07-12 PROCEDURE — 3609008300 HC SIGMOIDOSCOPY W/BIOPSY SINGLE/MULTIPLE: Performed by: SPECIALIST

## 2022-07-12 PROCEDURE — 3700000000 HC ANESTHESIA ATTENDED CARE: Performed by: SPECIALIST

## 2022-07-12 PROCEDURE — 6360000002 HC RX W HCPCS

## 2022-07-12 PROCEDURE — 88305 TISSUE EXAM BY PATHOLOGIST: CPT | Performed by: PATHOLOGY

## 2022-07-12 PROCEDURE — 3700000001 HC ADD 15 MINUTES (ANESTHESIA): Performed by: SPECIALIST

## 2022-07-12 RX ORDER — PROPOFOL 10 MG/ML
INJECTION, EMULSION INTRAVENOUS PRN
Status: DISCONTINUED | OUTPATIENT
Start: 2022-07-12 | End: 2022-07-12 | Stop reason: SDUPTHER

## 2022-07-12 RX ORDER — SODIUM CHLORIDE, SODIUM LACTATE, POTASSIUM CHLORIDE, CALCIUM CHLORIDE 600; 310; 30; 20 MG/100ML; MG/100ML; MG/100ML; MG/100ML
INJECTION, SOLUTION INTRAVENOUS CONTINUOUS
Status: DISCONTINUED | OUTPATIENT
Start: 2022-07-12 | End: 2022-07-12 | Stop reason: HOSPADM

## 2022-07-12 RX ADMIN — SODIUM CHLORIDE, POTASSIUM CHLORIDE, SODIUM LACTATE AND CALCIUM CHLORIDE: 600; 310; 30; 20 INJECTION, SOLUTION INTRAVENOUS at 13:18

## 2022-07-12 RX ADMIN — PROPOFOL 460 MG: 10 INJECTION, EMULSION INTRAVENOUS at 13:56

## 2022-07-12 ASSESSMENT — PAIN - FUNCTIONAL ASSESSMENT: PAIN_FUNCTIONAL_ASSESSMENT: 0-10

## 2022-07-12 ASSESSMENT — PAIN SCALES - GENERAL
PAINLEVEL_OUTOF10: 0

## 2022-07-12 NOTE — PROGRESS NOTES
1439  Pt back to pre/post room from Endo per cart. Pt is responsive to noxious stimulus. Skin pink, W&D.  VS stable. Resp even and unlabored. 1445 Pt waking more now--responds appropriately in conversation. Given water to drink per request.   1453  Pt fully awake now, conversing with friend at bedside. Tolerating water w/o nausea. 0  Dr Dolly Garcia in to speak with patient about results. 1515 VS remain stable. IV dc'd for patient to get dressed to go home. 18  Pt instructed for discharge care and follow-up with understanding voiced. Pt to bathroom then ambulatory to car at exit with nurse escort.

## 2022-07-12 NOTE — BRIEF OP NOTE
BRIEF SIGMOIDOSCOPY REPORT:   Photos and full colonoscopy report available by going to \"chart review\" then \"procedures\" then  \"colonoscopy\" then \"View Report\"      IMPRESSION :   1) S/P subtotal colectomy with end-to-side ileo-sigmoid anastamosis  2) stenosis, but no ulceration, at the true ileo-sigmoid anastamosis- permitted tight passage of the gastroscope  3) 5 mm inflammatory pseudopolyp adjacent to the anastamosis of the blind ileal loop-sigmoid anastamosis.   4) no ulceration or active Crohn's seen at any point, including the rectosigmoid and 15 cm into the rufina-terminal ileum  5) small internal hemorrhoids  6) 4-quadrant biopsies taken from the rectosigmoid at 10  cm intervals    PLAN : continue Entyvio

## 2022-07-12 NOTE — ANESTHESIA POSTPROCEDURE EVALUATION
Department of Anesthesiology  Postprocedure Note    Patient: Trista Hazel  MRN: [id number]  YOB: 1950  Date of evaluation: 7/12/2022      Procedure Summary     Date: 07/12/22 Room / Location: [address]    Anesthesia Start: 1350 Anesthesia Stop: 8233    Procedure: [address] (N/A ) Diagnosis:       History of colon resection      Crohn's disease of both small and large intestine with complication ([address]      Small bowel obstruction ([address]      (History of colon resection [Z90.49] Crohn's disease of both small and large intestine with complication ([address] [P56.006] Small bowel obstruction ([address] [X64.633])    Surgeons: Sarah Del Valle MD Responsible Provider: GUMARO Reed CRNA    Anesthesia Type: MAC ASA Status: 2          Anesthesia Type: No value filed.     Makenna Phase I:      Makenna Phase II:        Anesthesia Post Evaluation    Patient location during evaluation: bedside  Patient participation: complete - patient participated  Level of consciousness: awake  Airway patency: patent  Nausea & Vomiting: no nausea and no vomiting  Complications: no  Cardiovascular status: blood pressure returned to baseline  Respiratory status: acceptable and room air  Hydration status: euvolemic

## 2022-07-22 LAB
ALBUMIN/GLOBULIN RATIO: 2.1 RATIO (ref 0.8–2.6)
ALBUMIN: 4.5 G/DL (ref 3.5–5.2)
ALP BLD-CCNC: 66 U/L (ref 23–144)
ALT SERPL-CCNC: 8 U/L (ref 0–60)
AST SERPL-CCNC: 19 U/L (ref 0–55)
BILIRUB SERPL-MCNC: 0.2 MG/DL (ref 0–1.2)
BUN BLDV-MCNC: 15 MG/DL (ref 3–29)
BUN/CREAT BLD: 10 (ref 7–25)
CALCIUM SERPL-MCNC: 9.3 MG/DL (ref 8.5–10.5)
CHLORIDE BLD-SCNC: 105 MEQ/L (ref 96–110)
CO2: 25 MEQ/L (ref 19–32)
CREAT SERPL-MCNC: 1.5 MG/DL (ref 0.5–1.4)
GLOBULIN: 2.1 G/DL (ref 1.9–3.6)
GLOMERULAR FILTRATION RATE: 49 MLS/MIN/1.73M2
GLUCOSE BLD-MCNC: 88 MG/DL (ref 70–99)
HCT VFR BLD CALC: 41.8 % (ref 37.5–51)
HEMOGLOBIN: 14 G/DL (ref 13–17.7)
MCH RBC QN AUTO: 31.8 PG (ref 26–34)
MCHC RBC AUTO-ENTMCNC: 33.5 G/DL (ref 30.7–35.5)
MCV RBC AUTO: 95 FL (ref 80–100)
PDW BLD-RTO: 12.2 %
PLATELET # BLD: 205 K/UL (ref 140–400)
PMV BLD AUTO: 11.6 FL (ref 7.2–11.7)
POTASSIUM SERPL-SCNC: 4.6 MEQ/L (ref 3.4–5.3)
RBC # BLD: 4.4 M/UL (ref 4.14–5.8)
SODIUM BLD-SCNC: 139 MEQ/L (ref 135–148)
STATUS: ABNORMAL
TOTAL PROTEIN: 6.6 G/DL (ref 6–8.3)
VITAMIN D 25-HYDROXY: 30 NG/ML (ref 30–100)
WBC: 5.6 K/UL (ref 3.5–10.9)

## 2022-08-05 ENCOUNTER — OFFICE VISIT (OUTPATIENT)
Dept: GASTROENTEROLOGY | Age: 72
End: 2022-08-05
Payer: MEDICARE

## 2022-08-05 VITALS
DIASTOLIC BLOOD PRESSURE: 78 MMHG | HEART RATE: 70 BPM | BODY MASS INDEX: 28.41 KG/M2 | HEIGHT: 67 IN | WEIGHT: 181 LBS | OXYGEN SATURATION: 96 % | SYSTOLIC BLOOD PRESSURE: 138 MMHG

## 2022-08-05 DIAGNOSIS — E55.9 VITAMIN D DEFICIENCY: ICD-10-CM

## 2022-08-05 DIAGNOSIS — K50.814 CROHN'S DISEASE OF BOTH SMALL AND LARGE INTESTINE WITH ABSCESS (HCC): Primary | ICD-10-CM

## 2022-08-05 PROCEDURE — G8427 DOCREV CUR MEDS BY ELIG CLIN: HCPCS | Performed by: SPECIALIST

## 2022-08-05 PROCEDURE — 1123F ACP DISCUSS/DSCN MKR DOCD: CPT | Performed by: SPECIALIST

## 2022-08-05 PROCEDURE — 3017F COLORECTAL CA SCREEN DOC REV: CPT | Performed by: SPECIALIST

## 2022-08-05 PROCEDURE — 1036F TOBACCO NON-USER: CPT | Performed by: SPECIALIST

## 2022-08-05 PROCEDURE — 99214 OFFICE O/P EST MOD 30 MIN: CPT | Performed by: SPECIALIST

## 2022-08-05 PROCEDURE — G8417 CALC BMI ABV UP PARAM F/U: HCPCS | Performed by: SPECIALIST

## 2022-08-05 RX ORDER — DIPHENOXYLATE HYDROCHLORIDE AND ATROPINE SULFATE 2.5; .025 MG/1; MG/1
2 TABLET ORAL 2 TIMES DAILY
Qty: 360 TABLET | Refills: 2 | Status: SHIPPED | OUTPATIENT
Start: 2022-08-05 | End: 2023-07-31

## 2022-08-05 RX ORDER — DIPHENOXYLATE HYDROCHLORIDE AND ATROPINE SULFATE 2.5; .025 MG/1; MG/1
2 TABLET ORAL 2 TIMES DAILY
COMMUNITY
End: 2022-08-05 | Stop reason: SDUPTHER

## 2022-08-05 RX ORDER — ERGOCALCIFEROL 1.25 MG/1
50000 CAPSULE ORAL WEEKLY
Qty: 12 CAPSULE | Refills: 1 | Status: SHIPPED | OUTPATIENT
Start: 2022-08-05

## 2022-08-05 NOTE — PROGRESS NOTES
Gastroenterology Office Note            Asuncion Warner. Andie AGGARWAL      Subjective:      Patient ID:      Hodan Matthews                 1950    CC: follow up for Crohns    HPI:   Doing well- reports no problems  Having 5-6 BM/d- no recent nocturnal stools  No abd pain, nausea, vomiting, fever, skin lesions, eye symptoms  Has had 2 Covid vaccines and 2 boosters      Review of Systems:    see HPI for positives and pertinent negatives.  All other systems reviewed and are negative     Objective:   PHYSICAL EXAM:    Vitals:  /78 (Site: Right Upper Arm, Position: Sitting, Cuff Size: Medium Adult)   Pulse 70   Ht 5' 7\" (1.702 m)   Wt 181 lb (82.1 kg)   SpO2 96%   BMI 28.35 kg/m²   CONSTITUTIONAL: alert    LUNGS:  clear to auscultation  CARDIOVASCULAR:  regular rate and rhythm and no murmur noted  ABDOMEN:  normal bowel sounds, non-distended, non-tender and no masses palpated, no hepatosplenomegaly  EXTREMITIES: no clubbing, cyanosis, or edema    Assessment:      1) Crohn's ileocolitis- S/P extensive resection and ileocolic anastamosis with chronic stricture  2) Vit D deficiency- due to malabsorption- replacement now with level at low end of normal      Plan:      Continue current meds including Entyvio  Return 4 months  Call if any problems

## 2022-08-11 LAB
ALBUMIN: 4.7 G/DL (ref 3.5–5.2)
BILIRUBIN URINE: NEGATIVE MG/DL
BUN BLDV-MCNC: 20 MG/DL (ref 3–29)
BUN/CREAT BLD: 12 (ref 7–25)
CALCIUM SERPL-MCNC: 9.9 MG/DL (ref 8.5–10.5)
CHLORIDE BLD-SCNC: 104 MEQ/L (ref 96–110)
CLARITY: CLEAR
CO2: 24 MEQ/L (ref 19–32)
COLOR, URINE: YELLOW
CREAT SERPL-MCNC: 1.7 MG/DL (ref 0.5–1.4)
CREATININE URINE: 225.8 MG/DL
FASTING STATUS: ABNORMAL
GLOMERULAR FILTRATION RATE: 42 MLS/MIN/1.73M2
GLUCOSE BLD-MCNC: 91 MG/DL (ref 70–99)
GLUCOSE URINE: NEGATIVE MG/DL
KETONES, URINE: NEGATIVE MG/DL
LEUKOCYTE ESTERASE, URINE: NEGATIVE
MAGNESIUM: 2 MG/DL (ref 1.4–2.5)
NITRATE, UA: NEGATIVE
PH, URINE: 5.5 (ref 4.5–8)
PHOSPHORUS: 2.8 MG/DL (ref 2.1–4.3)
POTASSIUM SERPL-SCNC: 4.3 MEQ/L (ref 3.4–5.3)
PROT/CREAT RATIO, UR: 0.1 RATIO (ref 5–170)
SODIUM BLD-SCNC: 140 MEQ/L (ref 135–148)
SPECIFIC GRAVITY, URINE: 1.02 (ref 1–1.03)
TOTAL PROTEIN, URINE: 10 MG/DL
TOTAL PROTEIN, URINE: 12 MG/DL
URINE HGB: NEGATIVE MG/DL
UROBILINOGEN, URINE: <2 MG/DL (ref 0–2)

## 2022-08-18 PROBLEM — N18.32 STAGE 3B CHRONIC KIDNEY DISEASE (HCC): Status: ACTIVE | Noted: 2022-08-18

## 2022-08-18 PROBLEM — I10 PRIMARY HYPERTENSION: Status: ACTIVE | Noted: 2022-08-18

## 2022-08-31 ENCOUNTER — HOSPITAL ENCOUNTER (OUTPATIENT)
Dept: INFUSION THERAPY | Age: 72
Setting detail: INFUSION SERIES
Discharge: HOME | End: 2022-08-31
Payer: MEDICARE

## 2022-08-31 VITALS
DIASTOLIC BLOOD PRESSURE: 76 MMHG | SYSTOLIC BLOOD PRESSURE: 141 MMHG | OXYGEN SATURATION: 97 % | RESPIRATION RATE: 16 BRPM | TEMPERATURE: 97.3 F | HEART RATE: 68 BPM

## 2022-08-31 DIAGNOSIS — K50.80 CROHN'S DISEASE OF BOTH SMALL AND LARGE INTESTINE WITHOUT COMPLICATION (HCC): Primary | ICD-10-CM

## 2022-08-31 PROCEDURE — 99211 OFF/OP EST MAY X REQ PHY/QHP: CPT

## 2022-08-31 PROCEDURE — 6360000002 HC RX W HCPCS: Performed by: SPECIALIST

## 2022-08-31 PROCEDURE — 2580000003 HC RX 258: Performed by: SPECIALIST

## 2022-08-31 PROCEDURE — 96365 THER/PROPH/DIAG IV INF INIT: CPT

## 2022-08-31 PROCEDURE — 85025 COMPLETE CBC W/AUTO DIFF WBC: CPT

## 2022-08-31 RX ORDER — SODIUM CHLORIDE 0.9 % (FLUSH) 0.9 %
30 SYRINGE (ML) INJECTION ONCE
Status: COMPLETED | OUTPATIENT
Start: 2022-08-31 | End: 2022-08-31

## 2022-08-31 RX ORDER — SODIUM CHLORIDE 0.9 % (FLUSH) 0.9 %
5-40 SYRINGE (ML) INJECTION PRN
Status: CANCELLED | OUTPATIENT
Start: 2022-10-26

## 2022-08-31 RX ORDER — SODIUM CHLORIDE 0.9 % (FLUSH) 0.9 %
30 SYRINGE (ML) INJECTION ONCE
Status: CANCELLED | OUTPATIENT
Start: 2022-10-26

## 2022-08-31 RX ORDER — SODIUM CHLORIDE 0.9 % (FLUSH) 0.9 %
5-40 SYRINGE (ML) INJECTION PRN
Status: DISCONTINUED | OUTPATIENT
Start: 2022-08-31 | End: 2022-09-01 | Stop reason: HOSPADM

## 2022-08-31 RX ADMIN — VEDOLIZUMAB 300 MG: 300 INJECTION, POWDER, LYOPHILIZED, FOR SOLUTION INTRAVENOUS at 09:30

## 2022-08-31 RX ADMIN — SODIUM CHLORIDE, PRESERVATIVE FREE 10 ML: 5 INJECTION INTRAVENOUS at 09:26

## 2022-08-31 RX ADMIN — SODIUM CHLORIDE, PRESERVATIVE FREE 30 ML: 5 INJECTION INTRAVENOUS at 10:06

## 2022-08-31 NOTE — PROGRESS NOTES
Pt taken to room 1, oriented to room, bed/chair controls, and call light. Needs met at present. Call light in reach. Pt aware of and agreeable for plan of care.

## 2022-08-31 NOTE — PROGRESS NOTES
Tolerated ENTYVIO well. Reviewed discharge instruction, voiced understanding. Copies of AVS given. Pt discharged HOME. Pt to exit via AMBULATION.     Orders Placed This Encounter   Medications    vedolizumab (ENTYVIO) 300 mg in sodium chloride 0.9 % 250 mL infusion    sodium chloride flush 0.9 % injection 30 mL    sodium chloride flush 0.9 % injection 5-40 mL

## 2022-08-31 NOTE — DISCHARGE INSTRUCTIONS
DISCHARGE AFTER ENTYVIO INFUSION:    Notify your doctor for:   Rash, hives, itching, or blistered or peeling skin   Signs of infection- fever, chills   Dark urine, light colored stools, yellow skin or eyes. If your symptoms worsen after you receive treatment,  call your doctor. Go to the ER for:   Shortness of Breath   Chest pain    Thank you for choosing [facility] Outpatient Infusion Unit. It is a pleasure to serve you.     Outpatient Infusion Unit  [phone]

## 2022-10-07 ENCOUNTER — TELEPHONE (OUTPATIENT)
Dept: GASTROENTEROLOGY | Age: 72
End: 2022-10-07

## 2022-10-07 DIAGNOSIS — K50.814 CROHN'S DISEASE OF BOTH SMALL AND LARGE INTESTINE WITH ABSCESS (HCC): Primary | ICD-10-CM

## 2022-10-07 NOTE — TELEPHONE ENCOUNTER
Patient left a message requesting an order to recheck his vitamin B12 levels. As, well as the order being mailed to him so he can take it to [facility]. Per Dr. Natalie Sánchez, to go ahead and put the order in. Patient was called and notified, as well as verifying his address.

## 2022-10-24 LAB
ALBUMIN/GLOBULIN RATIO: 2 RATIO (ref 0.8–2.6)
ALBUMIN: 4.6 G/DL (ref 3.5–5.2)
ALP BLD-CCNC: 63 U/L (ref 23–144)
ALT SERPL-CCNC: 12 U/L (ref 0–60)
AST SERPL-CCNC: 29 U/L (ref 0–55)
BILIRUB SERPL-MCNC: 0.6 MG/DL (ref 0–1.2)
BUN BLDV-MCNC: 27 MG/DL (ref 3–29)
BUN/CREAT BLD: 16 (ref 7–25)
CALCIUM SERPL-MCNC: 9.9 MG/DL (ref 8.5–10.5)
CHLORIDE BLD-SCNC: 107 MEQ/L (ref 96–110)
CO2: 22 MEQ/L (ref 19–32)
CREAT SERPL-MCNC: 1.7 MG/DL (ref 0.5–1.4)
GLOBULIN: 2.3 G/DL (ref 1.9–3.6)
GLOMERULAR FILTRATION RATE: 42 MLS/MIN/1.73M2
GLUCOSE BLD-MCNC: 88 MG/DL (ref 70–99)
HCT VFR BLD CALC: 40.7 % (ref 37.5–51)
HEMOGLOBIN: 14.3 G/DL (ref 13–17.7)
MCH RBC QN AUTO: 33 PG (ref 26–34)
MCHC RBC AUTO-ENTMCNC: 35.1 G/DL (ref 30.7–35.5)
MCV RBC AUTO: 94 FL (ref 80–100)
PDW BLD-RTO: 12.3 %
PLATELET # BLD: 190 K/UL (ref 140–400)
PMV BLD AUTO: 11.5 FL (ref 7.2–11.7)
POTASSIUM SERPL-SCNC: 4.4 MEQ/L (ref 3.4–5.3)
RBC # BLD: 4.33 M/UL (ref 4.14–5.8)
SODIUM BLD-SCNC: 142 MEQ/L (ref 135–148)
STATUS: ABNORMAL
TOTAL PROTEIN: 6.9 G/DL (ref 6–8.3)
VITAMIN B-12: 267 PG/ML (ref 200–1100)
VITAMIN D 25-HYDROXY: 28 NG/ML (ref 30–100)
WBC: 6 K/UL (ref 3.5–10.9)

## 2022-10-26 ENCOUNTER — HOSPITAL ENCOUNTER (OUTPATIENT)
Dept: INFUSION THERAPY | Age: 72
Setting detail: INFUSION SERIES
Discharge: HOME | End: 2022-10-26
Payer: MEDICARE

## 2022-10-26 VITALS
SYSTOLIC BLOOD PRESSURE: 149 MMHG | TEMPERATURE: 97.3 F | OXYGEN SATURATION: 97 % | RESPIRATION RATE: 16 BRPM | DIASTOLIC BLOOD PRESSURE: 84 MMHG | HEART RATE: 81 BPM

## 2022-10-26 DIAGNOSIS — K50.80 CROHN'S DISEASE OF BOTH SMALL AND LARGE INTESTINE WITHOUT COMPLICATION (HCC): Primary | ICD-10-CM

## 2022-10-26 PROCEDURE — 2580000003 HC RX 258: Performed by: SPECIALIST

## 2022-10-26 PROCEDURE — 99211 OFF/OP EST MAY X REQ PHY/QHP: CPT

## 2022-10-26 PROCEDURE — 6360000002 HC RX W HCPCS: Performed by: SPECIALIST

## 2022-10-26 PROCEDURE — 96365 THER/PROPH/DIAG IV INF INIT: CPT

## 2022-10-26 RX ORDER — SODIUM CHLORIDE 0.9 % (FLUSH) 0.9 %
5-40 SYRINGE (ML) INJECTION PRN
Status: DISCONTINUED | OUTPATIENT
Start: 2022-10-26 | End: 2022-10-26

## 2022-10-26 RX ORDER — SODIUM CHLORIDE 0.9 % (FLUSH) 0.9 %
5-40 SYRINGE (ML) INJECTION PRN
Status: CANCELLED | OUTPATIENT
Start: 2022-12-21

## 2022-10-26 RX ORDER — SODIUM CHLORIDE 0.9 % (FLUSH) 0.9 %
30 SYRINGE (ML) INJECTION ONCE
Status: COMPLETED | OUTPATIENT
Start: 2022-10-26 | End: 2022-10-26

## 2022-10-26 RX ORDER — SODIUM CHLORIDE 0.9 % (FLUSH) 0.9 %
30 SYRINGE (ML) INJECTION ONCE
Status: CANCELLED | OUTPATIENT
Start: 2022-12-21

## 2022-10-26 RX ADMIN — VEDOLIZUMAB 300 MG: 300 INJECTION, POWDER, LYOPHILIZED, FOR SOLUTION INTRAVENOUS at 10:01

## 2022-10-26 RX ADMIN — SODIUM CHLORIDE, PRESERVATIVE FREE 30 ML: 5 INJECTION INTRAVENOUS at 10:34

## 2022-10-26 RX ADMIN — SODIUM CHLORIDE, PRESERVATIVE FREE 10 ML: 5 INJECTION INTRAVENOUS at 09:44

## 2022-10-26 NOTE — THERAPY DISCHARGE
Tolerated INFUSION well. Reviewed discharge instruction, voiced understanding. Copies of AVS given. Pt discharged HOME. Pt to exit via [facility].     Orders Placed This Encounter   Medications    vedolizumab (ENTYVIO) 300 mg in sodium chloride 0.9 % 250 mL infusion    sodium chloride flush 0.9 % injection 30 mL    sodium chloride flush 0.9 % injection 5-40 mL

## 2022-12-05 ENCOUNTER — OFFICE VISIT (OUTPATIENT)
Dept: GASTROENTEROLOGY | Age: 72
End: 2022-12-05
Payer: MEDICARE

## 2022-12-05 VITALS
BODY MASS INDEX: 28.85 KG/M2 | DIASTOLIC BLOOD PRESSURE: 76 MMHG | HEIGHT: 67 IN | HEART RATE: 80 BPM | OXYGEN SATURATION: 97 % | WEIGHT: 183.8 LBS | TEMPERATURE: 97 F | SYSTOLIC BLOOD PRESSURE: 132 MMHG

## 2022-12-05 DIAGNOSIS — K50.80 CROHN'S DISEASE OF BOTH SMALL AND LARGE INTESTINE WITHOUT COMPLICATION (HCC): Primary | ICD-10-CM

## 2022-12-05 DIAGNOSIS — E55.9 VITAMIN D DEFICIENCY: ICD-10-CM

## 2022-12-05 PROCEDURE — G8427 DOCREV CUR MEDS BY ELIG CLIN: HCPCS | Performed by: SPECIALIST

## 2022-12-05 PROCEDURE — 1123F ACP DISCUSS/DSCN MKR DOCD: CPT | Performed by: SPECIALIST

## 2022-12-05 PROCEDURE — 3078F DIAST BP <80 MM HG: CPT | Performed by: SPECIALIST

## 2022-12-05 PROCEDURE — G8484 FLU IMMUNIZE NO ADMIN: HCPCS | Performed by: SPECIALIST

## 2022-12-05 PROCEDURE — 3074F SYST BP LT 130 MM HG: CPT | Performed by: SPECIALIST

## 2022-12-05 PROCEDURE — 1036F TOBACCO NON-USER: CPT | Performed by: SPECIALIST

## 2022-12-05 PROCEDURE — 3017F COLORECTAL CA SCREEN DOC REV: CPT | Performed by: SPECIALIST

## 2022-12-05 PROCEDURE — G8417 CALC BMI ABV UP PARAM F/U: HCPCS | Performed by: SPECIALIST

## 2022-12-05 PROCEDURE — 99214 OFFICE O/P EST MOD 30 MIN: CPT | Performed by: SPECIALIST

## 2022-12-05 RX ORDER — SODIUM CHLORIDE 0.9 % (FLUSH) 0.9 %
5-40 SYRINGE (ML) INJECTION PRN
Status: CANCELLED | OUTPATIENT
Start: 2022-12-12

## 2022-12-05 RX ORDER — SODIUM CHLORIDE 0.9 % (FLUSH) 0.9 %
30 SYRINGE (ML) INJECTION ONCE
Status: CANCELLED
Start: 2022-12-12 | End: 2022-12-12

## 2022-12-05 RX ORDER — ERGOCALCIFEROL 1.25 MG/1
50000 CAPSULE ORAL
Qty: 24 CAPSULE | Refills: 3 | Status: SHIPPED | OUTPATIENT
Start: 2022-12-05

## 2022-12-05 NOTE — PROGRESS NOTES
Gastroenterology Office Note            Danny Monroy. Andie AGGARWAL      Subjective:      Patient ID:      Saman Montanez                 1950    CC: 3  month follow up for Crohn's    HPI:   Doing well- reports no problems-- having his baseline 6 BM/d- no blood, abd pain,vomiting  The patient denies abdominal pain,nausea,vomiting, diarrhea, constipation,melena, hematochezia,hematemesis,weight loss or dysphagia. Taking Vit D 50,000 units weekly      Review of Systems:    see HPI for positives and pertinent negatives.  All other systems reviewed and are negative     Objective:   PHYSICAL EXAM:    Vitals:  /76 (Site: Left Upper Arm, Position: Sitting, Cuff Size: Medium Adult)   Pulse 80   Temp 97 °F (36.1 °C) (Infrared)   Ht 5' 7\" (1.702 m)   Wt 183 lb 12.8 oz (83.4 kg)   SpO2 97%   BMI 28.79 kg/m²   CONSTITUTIONAL: alert    LUNGS:  clear to auscultation  CARDIOVASCULAR:  regular rate and rhythm and no murmur noted  ABDOMEN:  normal bowel sounds, non-distended, non-tender and no masses palpated, no hepatosplenomegaly  EXTREMITIES: no clubbing, cyanosis, or edema    Assessment:      1) Crohn's ileocolitis- S/P extensive resection and ileocolic anastamosis with chronic stricture  2) 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

## 2022-12-21 ENCOUNTER — HOSPITAL ENCOUNTER (OUTPATIENT)
Dept: INFUSION THERAPY | Age: 72
Setting detail: INFUSION SERIES
Discharge: HOME | End: 2022-12-21
Payer: MEDICARE

## 2022-12-21 VITALS
RESPIRATION RATE: 16 BRPM | HEART RATE: 71 BPM | OXYGEN SATURATION: 97 % | SYSTOLIC BLOOD PRESSURE: 151 MMHG | DIASTOLIC BLOOD PRESSURE: 76 MMHG

## 2022-12-21 DIAGNOSIS — K50.812 CROHN'S DISEASE OF BOTH SMALL AND LARGE INTESTINE WITH INTESTINAL OBSTRUCTION (HCC): Primary | ICD-10-CM

## 2022-12-21 PROCEDURE — 99211 OFF/OP EST MAY X REQ PHY/QHP: CPT

## 2022-12-21 PROCEDURE — 6360000002 HC RX W HCPCS: Performed by: SPECIALIST

## 2022-12-21 PROCEDURE — 2580000003 HC RX 258: Performed by: SPECIALIST

## 2022-12-21 PROCEDURE — 96365 THER/PROPH/DIAG IV INF INIT: CPT

## 2022-12-21 RX ORDER — SODIUM CHLORIDE 0.9 % (FLUSH) 0.9 %
30 SYRINGE (ML) INJECTION ONCE
Status: COMPLETED | OUTPATIENT
Start: 2022-12-21 | End: 2022-12-21

## 2022-12-21 RX ORDER — SODIUM CHLORIDE 0.9 % (FLUSH) 0.9 %
5-40 SYRINGE (ML) INJECTION PRN
OUTPATIENT
Start: 2023-02-15

## 2022-12-21 RX ORDER — SODIUM CHLORIDE 0.9 % (FLUSH) 0.9 %
30 SYRINGE (ML) INJECTION ONCE
Start: 2023-02-15 | End: 2023-02-15

## 2022-12-21 RX ORDER — SODIUM CHLORIDE 0.9 % (FLUSH) 0.9 %
5-40 SYRINGE (ML) INJECTION PRN
Status: DISCONTINUED | OUTPATIENT
Start: 2022-12-21 | End: 2022-12-22 | Stop reason: HOSPADM

## 2022-12-21 RX ADMIN — SODIUM CHLORIDE, PRESERVATIVE FREE 10 ML: 5 INJECTION INTRAVENOUS at 09:40

## 2022-12-21 RX ADMIN — VEDOLIZUMAB 300 MG: 300 INJECTION, POWDER, LYOPHILIZED, FOR SOLUTION INTRAVENOUS at 10:17

## 2022-12-21 RX ADMIN — SODIUM CHLORIDE, PRESERVATIVE FREE 30 ML: 5 INJECTION INTRAVENOUS at 10:53

## 2022-12-21 NOTE — PROGRESS NOTES
Tolerated INFUSION well. Reviewed discharge instruction, voiced understanding. Copies of AVS given. Pt discharged HOME. Pt to exit via [facility].     Orders Placed This Encounter   Medications    vedolizumab (ENTYVIO) 300 mg in sodium chloride 0.9 % 250 mL infusion    sodium chloride flush 0.9 % injection 5-40 mL    sodium chloride flush 0.9 % injection 30 mL

## 2023-02-15 ENCOUNTER — HOSPITAL ENCOUNTER (OUTPATIENT)
Dept: INFUSION THERAPY | Age: 73
Setting detail: INFUSION SERIES
Discharge: HOME | End: 2023-02-15
Payer: MEDICARE

## 2023-02-15 VITALS
RESPIRATION RATE: 16 BRPM | HEART RATE: 76 BPM | WEIGHT: 177 LBS | TEMPERATURE: 97.4 F | HEIGHT: 67 IN | SYSTOLIC BLOOD PRESSURE: 131 MMHG | OXYGEN SATURATION: 96 % | BODY MASS INDEX: 27.78 KG/M2 | DIASTOLIC BLOOD PRESSURE: 77 MMHG

## 2023-02-15 DIAGNOSIS — K50.812 CROHN'S DISEASE OF BOTH SMALL AND LARGE INTESTINE WITH INTESTINAL OBSTRUCTION (HCC): Primary | ICD-10-CM

## 2023-02-15 PROCEDURE — 6360000002 HC RX W HCPCS: Performed by: SPECIALIST

## 2023-02-15 PROCEDURE — 96365 THER/PROPH/DIAG IV INF INIT: CPT

## 2023-02-15 PROCEDURE — 2580000003 HC RX 258: Performed by: SPECIALIST

## 2023-02-15 PROCEDURE — 99211 OFF/OP EST MAY X REQ PHY/QHP: CPT

## 2023-02-15 RX ORDER — SODIUM CHLORIDE 0.9 % (FLUSH) 0.9 %
30 SYRINGE (ML) INJECTION ONCE
Start: 2023-04-12 | End: 2023-04-12

## 2023-02-15 RX ORDER — SODIUM CHLORIDE 0.9 % (FLUSH) 0.9 %
5-40 SYRINGE (ML) INJECTION PRN
Status: DISCONTINUED | OUTPATIENT
Start: 2023-02-15 | End: 2023-02-16 | Stop reason: HOSPADM

## 2023-02-15 RX ORDER — SODIUM CHLORIDE 0.9 % (FLUSH) 0.9 %
30 SYRINGE (ML) INJECTION ONCE
Status: COMPLETED | OUTPATIENT
Start: 2023-02-15 | End: 2023-02-15

## 2023-02-15 RX ORDER — SODIUM CHLORIDE 0.9 % (FLUSH) 0.9 %
5-40 SYRINGE (ML) INJECTION PRN
OUTPATIENT
Start: 2023-04-12

## 2023-02-15 RX ADMIN — SODIUM CHLORIDE, PRESERVATIVE FREE 10 ML: 5 INJECTION INTRAVENOUS at 09:45

## 2023-02-15 RX ADMIN — VEDOLIZUMAB 300 MG: 300 INJECTION, POWDER, LYOPHILIZED, FOR SOLUTION INTRAVENOUS at 09:53

## 2023-02-15 RX ADMIN — SODIUM CHLORIDE, PRESERVATIVE FREE 30 ML: 5 INJECTION INTRAVENOUS at 10:27

## 2023-02-15 NOTE — DISCHARGE INSTRUCTIONS
Notify your doctor for:   Rash, hives, itching, or blistered or peeling skin   Signs of infection- fever, chills   Dark urine, light colored stools, yellow skin or eyes. If your symptoms worsen after you receive treatment,  call your doctor. Go to the ER for:   Shortness of Breath   Chest pain    Thank you for choosing [facility] Outpatient Infusion Unit. It is a pleasure to serve you.     Outpatient Infusion Unit  [phone]

## 2023-04-12 ENCOUNTER — HOSPITAL ENCOUNTER (OUTPATIENT)
Dept: INFUSION THERAPY | Age: 73
Setting detail: INFUSION SERIES
Discharge: HOME | End: 2023-04-12
Payer: MEDICARE

## 2023-04-12 VITALS
OXYGEN SATURATION: 97 % | SYSTOLIC BLOOD PRESSURE: 143 MMHG | HEART RATE: 66 BPM | DIASTOLIC BLOOD PRESSURE: 77 MMHG | RESPIRATION RATE: 16 BRPM

## 2023-04-12 DIAGNOSIS — K50.812 CROHN'S DISEASE OF BOTH SMALL AND LARGE INTESTINE WITH INTESTINAL OBSTRUCTION (HCC): Primary | ICD-10-CM

## 2023-04-12 PROCEDURE — 6360000002 HC RX W HCPCS: Performed by: SPECIALIST

## 2023-04-12 PROCEDURE — 2580000003 HC RX 258: Performed by: SPECIALIST

## 2023-04-12 PROCEDURE — 96365 THER/PROPH/DIAG IV INF INIT: CPT

## 2023-04-12 PROCEDURE — 99211 OFF/OP EST MAY X REQ PHY/QHP: CPT

## 2023-04-12 RX ORDER — SODIUM CHLORIDE 0.9 % (FLUSH) 0.9 %
5-40 SYRINGE (ML) INJECTION PRN
OUTPATIENT
Start: 2023-06-07

## 2023-04-12 RX ORDER — SODIUM CHLORIDE 0.9 % (FLUSH) 0.9 %
5-40 SYRINGE (ML) INJECTION PRN
Status: DISCONTINUED | OUTPATIENT
Start: 2023-04-12 | End: 2023-04-13 | Stop reason: HOSPADM

## 2023-04-12 RX ORDER — SODIUM CHLORIDE 0.9 % (FLUSH) 0.9 %
30 SYRINGE (ML) INJECTION ONCE
Start: 2023-06-07 | End: 2023-06-07

## 2023-04-12 RX ORDER — SODIUM CHLORIDE 0.9 % (FLUSH) 0.9 %
30 SYRINGE (ML) INJECTION ONCE
Status: COMPLETED | OUTPATIENT
Start: 2023-04-12 | End: 2023-04-12

## 2023-04-12 RX ADMIN — SODIUM CHLORIDE, PRESERVATIVE FREE 30 ML: 5 INJECTION INTRAVENOUS at 10:15

## 2023-04-12 RX ADMIN — VEDOLIZUMAB 300 MG: 300 INJECTION, POWDER, LYOPHILIZED, FOR SOLUTION INTRAVENOUS at 09:41

## 2023-04-12 RX ADMIN — SODIUM CHLORIDE, PRESERVATIVE FREE 10 ML: 5 INJECTION INTRAVENOUS at 09:40

## 2023-04-12 NOTE — DISCHARGE SUMMARY
Tolerated Transfusion well. Reviewed discharge instructions, understanding verbalized. Copies of AVS given to take home. Patient discharged home. Down to exit per self.     Orders Placed This Encounter   Medications    vedolizumab (ENTYVIO) 300 mg in sodium chloride 0.9 % 250 mL infusion    sodium chloride flush 0.9 % injection 5-40 mL    sodium chloride flush 0.9 % injection 30 mL

## 2023-04-12 NOTE — DISCHARGE INSTRUCTIONS
Notify your doctor for:   Rash, hives, itching, or blistered or peeling skin   Signs of infection- fever, chills   Dark urine, light colored stools, yellow skin or eyes. If your symptoms worsen after you receive treatment,  call your doctor. Go to the ER for:   Shortness of Breath   Chest pain    Thank you for choosing [facility] Outpatient Infusion Unit. It is a pleasure to serve you.     Outpatient Infusion Unit  [phone]

## 2023-04-27 LAB
ALBUMIN SERPL-MCNC: 4.7 G/DL
ALBUMIN SERPL-MCNC: 4.7 G/DL
ALBUMIN/GLOBULIN RATIO: 2 RATIO (ref 0.8–2.6)
ALBUMIN: 4.7 G/DL (ref 3.5–5.2)
ALP BLD-CCNC: 72 U/L
ALP BLD-CCNC: 72 U/L
ALP BLD-CCNC: 72 U/L (ref 23–144)
ALT SERPL-CCNC: 10 U/L
ALT SERPL-CCNC: 10 U/L
ALT SERPL-CCNC: 10 U/L (ref 0–60)
ANION GAP SERPL CALCULATED.3IONS-SCNC: 2 MMOL/L
ANION GAP SERPL CALCULATED.3IONS-SCNC: NORMAL MMOL/L
AST SERPL-CCNC: 21 U/L
AST SERPL-CCNC: 21 U/L
AST SERPL-CCNC: 21 U/L (ref 0–55)
BILIRUB SERPL-MCNC: 0.4 MG/DL (ref 0.1–1.4)
BILIRUB SERPL-MCNC: 0.4 MG/DL (ref 0.1–1.4)
BILIRUB SERPL-MCNC: 0.4 MG/DL (ref 0–1.2)
BUN BLDV-MCNC: 23 MG/DL
BUN BLDV-MCNC: 23 MG/DL (ref 3–29)
BUN BLDV-MCNC: NORMAL MG/DL
BUN/CREAT BLD: 14 (ref 7–25)
CALCIUM SERPL-MCNC: 10 MG/DL
CALCIUM SERPL-MCNC: 10 MG/DL
CALCIUM SERPL-MCNC: 10 MG/DL (ref 8.5–10.5)
CHLORIDE BLD-SCNC: 109 MEQ/L (ref 96–110)
CHLORIDE BLD-SCNC: 109 MMOL/L
CHLORIDE BLD-SCNC: 109 MMOL/L
CO2: 24 MEQ/L (ref 19–32)
CO2: 24 MMOL/L
CO2: 24 MMOL/L
CREAT SERPL-MCNC: 1.7 MG/DL
CREAT SERPL-MCNC: 1.7 MG/DL
CREAT SERPL-MCNC: 1.7 MG/DL (ref 0.5–1.4)
EGFR: NORMAL
EGFR: NORMAL
GLOBULIN: 2.3 G/DL (ref 1.9–3.6)
GLOMERULAR FILTRATION RATE: 42 MLS/MIN/1.73M2
GLUCOSE BLD-MCNC: 89 MG/DL
GLUCOSE BLD-MCNC: 89 MG/DL
GLUCOSE BLD-MCNC: 89 MG/DL (ref 70–99)
HCT VFR BLD CALC: 40.3 % (ref 37.5–51)
HEMOGLOBIN: 14.2 G/DL (ref 13–17.7)
MCH RBC QN AUTO: 33.2 PG (ref 26–34)
MCHC RBC AUTO-ENTMCNC: 35.2 G/DL (ref 30.7–35.5)
MCV RBC AUTO: 94.2 FL (ref 80–100)
PDW BLD-RTO: 12.5 %
PLATELET # BLD: 192 K/UL (ref 140–400)
PMV BLD AUTO: 11.6 FL (ref 7.2–11.7)
POTASSIUM SERPL-SCNC: 4.7 MEQ/L (ref 3.4–5.3)
POTASSIUM SERPL-SCNC: 4.7 MMOL/L
POTASSIUM SERPL-SCNC: 4.7 MMOL/L
RBC # BLD: 4.28 M/UL (ref 4.14–5.8)
SODIUM BLD-SCNC: 147 MEQ/L (ref 135–148)
SODIUM BLD-SCNC: 147 MMOL/L
SODIUM BLD-SCNC: 147 MMOL/L
STATUS: ABNORMAL
TOTAL PROTEIN: 7
TOTAL PROTEIN: 7
TOTAL PROTEIN: 7 G/DL (ref 6–8.3)
WBC: 6.1 K/UL (ref 3.5–10.9)

## 2023-05-18 ENCOUNTER — TELEPHONE (OUTPATIENT)
Dept: GASTROENTEROLOGY | Age: 73
End: 2023-05-18

## 2023-05-18 DIAGNOSIS — K50.814 CROHN'S DISEASE OF BOTH SMALL AND LARGE INTESTINE WITH ABSCESS (HCC): ICD-10-CM

## 2023-05-18 RX ORDER — DIPHENOXYLATE HYDROCHLORIDE AND ATROPINE SULFATE 2.5; .025 MG/1; MG/1
2 TABLET ORAL 2 TIMES DAILY
Qty: 360 TABLET | Refills: 3 | Status: SHIPPED | OUTPATIENT
Start: 2023-05-18 | End: 2024-05-12

## 2023-06-07 ENCOUNTER — HOSPITAL ENCOUNTER (OUTPATIENT)
Dept: INFUSION THERAPY | Age: 73
Setting detail: INFUSION SERIES
Discharge: HOME | End: 2023-06-07
Payer: MEDICARE

## 2023-06-07 VITALS
SYSTOLIC BLOOD PRESSURE: 134 MMHG | TEMPERATURE: 97.3 F | RESPIRATION RATE: 16 BRPM | DIASTOLIC BLOOD PRESSURE: 79 MMHG | OXYGEN SATURATION: 97 % | HEART RATE: 78 BPM

## 2023-06-07 DIAGNOSIS — K50.812 CROHN'S DISEASE OF BOTH SMALL AND LARGE INTESTINE WITH INTESTINAL OBSTRUCTION (HCC): Primary | ICD-10-CM

## 2023-06-07 PROCEDURE — 6360000002 HC RX W HCPCS: Performed by: SPECIALIST

## 2023-06-07 PROCEDURE — 96365 THER/PROPH/DIAG IV INF INIT: CPT

## 2023-06-07 PROCEDURE — 2580000003 HC RX 258: Performed by: SPECIALIST

## 2023-06-07 RX ORDER — SODIUM CHLORIDE 0.9 % (FLUSH) 0.9 %
5-40 SYRINGE (ML) INJECTION PRN
Status: DISCONTINUED | OUTPATIENT
Start: 2023-06-07 | End: 2023-06-08 | Stop reason: HOSPADM

## 2023-06-07 RX ORDER — SODIUM CHLORIDE 0.9 % (FLUSH) 0.9 %
5-40 SYRINGE (ML) INJECTION PRN
OUTPATIENT
Start: 2023-08-02

## 2023-06-07 RX ORDER — SODIUM CHLORIDE 0.9 % (FLUSH) 0.9 %
30 SYRINGE (ML) INJECTION ONCE
Start: 2023-08-02 | End: 2023-08-02

## 2023-06-07 RX ORDER — SODIUM CHLORIDE 0.9 % (FLUSH) 0.9 %
30 SYRINGE (ML) INJECTION ONCE
Status: COMPLETED | OUTPATIENT
Start: 2023-06-07 | End: 2023-06-07

## 2023-06-07 RX ORDER — TAMSULOSIN HYDROCHLORIDE 0.4 MG/1
0.4 CAPSULE ORAL DAILY
COMMUNITY

## 2023-06-07 RX ADMIN — VEDOLIZUMAB 300 MG: 300 INJECTION, POWDER, LYOPHILIZED, FOR SOLUTION INTRAVENOUS at 11:16

## 2023-06-07 RX ADMIN — SODIUM CHLORIDE, PRESERVATIVE FREE 30 ML: 5 INJECTION INTRAVENOUS at 11:52

## 2023-06-09 ENCOUNTER — OFFICE VISIT (OUTPATIENT)
Dept: GASTROENTEROLOGY | Age: 73
End: 2023-06-09
Payer: MEDICARE

## 2023-06-09 VITALS
BODY MASS INDEX: 28.94 KG/M2 | DIASTOLIC BLOOD PRESSURE: 62 MMHG | TEMPERATURE: 98.3 F | HEIGHT: 67 IN | WEIGHT: 184.4 LBS | OXYGEN SATURATION: 96 % | HEART RATE: 65 BPM | SYSTOLIC BLOOD PRESSURE: 128 MMHG

## 2023-06-09 DIAGNOSIS — K86.2 CYST OF PANCREAS: ICD-10-CM

## 2023-06-09 DIAGNOSIS — K50.80 CROHN'S DISEASE OF BOTH SMALL AND LARGE INTESTINE WITHOUT COMPLICATION (HCC): Primary | ICD-10-CM

## 2023-06-09 PROCEDURE — 99214 OFFICE O/P EST MOD 30 MIN: CPT | Performed by: SPECIALIST

## 2023-06-09 PROCEDURE — G8417 CALC BMI ABV UP PARAM F/U: HCPCS | Performed by: SPECIALIST

## 2023-06-09 PROCEDURE — 3078F DIAST BP <80 MM HG: CPT | Performed by: SPECIALIST

## 2023-06-09 PROCEDURE — 1123F ACP DISCUSS/DSCN MKR DOCD: CPT | Performed by: SPECIALIST

## 2023-06-09 PROCEDURE — 3017F COLORECTAL CA SCREEN DOC REV: CPT | Performed by: SPECIALIST

## 2023-06-09 PROCEDURE — 1036F TOBACCO NON-USER: CPT | Performed by: SPECIALIST

## 2023-06-09 PROCEDURE — G8427 DOCREV CUR MEDS BY ELIG CLIN: HCPCS | Performed by: SPECIALIST

## 2023-06-09 PROCEDURE — 3074F SYST BP LT 130 MM HG: CPT | Performed by: SPECIALIST

## 2023-06-09 NOTE — PROGRESS NOTES
Gastroenterology Office Note            Elias Hester MD      Subjective:      Patient ID:      Melina Spann                 1950    CC: 6  month follow up for Crohn's    HPI:   Doing well- reports no problems-- having 5-6 BM/d  The patient denies abdominal pain,nausea,vomiting, diarrhea, constipation,melena, hematochezia,hematemesis,weight loss or dysphagia. Review of Systems:    see HPI for positives and pertinent negatives.  All other systems reviewed and are negative     Objective:   PHYSICAL EXAM:    Vitals:  /62 (Site: Left Upper Arm, Position: Sitting, Cuff Size: Medium Adult)   Pulse 65   Temp 98.3 °F (36.8 °C) (Infrared)   Ht 5' 7\" (1.702 m)   Wt 184 lb 6.4 oz (83.6 kg)   SpO2 96%   BMI 28.88 kg/m²   CONSTITUTIONAL: alert    LUNGS:  clear to auscultation  CARDIOVASCULAR:  regular rate and rhythm and no murmur noted  ABDOMEN:  normal bowel sounds, non-distended, non-tender and no masses palpated, no hepatosplenomegaly  EXTREMITIES: no clubbing, cyanosis, or edema    Assessment:      1) Crohn's ileocolitis- S/P extensive resection and ileocolic anastamosis with chronic stricture  2) 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

## 2023-06-14 LAB
VITAMIN D 25-HYDROXY: 48
VITAMIN D2, 25 HYDROXY: NORMAL
VITAMIN D3,25 HYDROXY: NORMAL

## 2023-06-23 ENCOUNTER — HOSPITAL ENCOUNTER (INPATIENT)
Age: 73
LOS: 6 days | Discharge: HOME | DRG: 386 | End: 2023-06-30
Attending: EMERGENCY MEDICINE
Payer: MEDICARE

## 2023-06-23 ENCOUNTER — APPOINTMENT (OUTPATIENT)
Dept: CT IMAGING | Age: 73
DRG: 386 | End: 2023-06-23
Payer: MEDICARE

## 2023-06-23 DIAGNOSIS — F32.A DEPRESSION, UNSPECIFIED DEPRESSION TYPE: ICD-10-CM

## 2023-06-23 DIAGNOSIS — K56.7 ILEUS (HCC): Primary | ICD-10-CM

## 2023-06-23 LAB
ALBUMIN SERPL-MCNC: 4.6 GM/DL (ref 3.4–5)
ALP BLD-CCNC: 72 IU/L (ref 40–129)
ALT SERPL-CCNC: 9 U/L (ref 10–40)
ANION GAP SERPL CALCULATED.3IONS-SCNC: 16 MMOL/L (ref 4–16)
AST SERPL-CCNC: 22 IU/L (ref 15–37)
BASOPHILS ABSOLUTE: 0 K/CU MM
BASOPHILS RELATIVE PERCENT: 0.1 % (ref 0–1)
BILIRUB SERPL-MCNC: 0.6 MG/DL (ref 0–1)
BUN SERPL-MCNC: 27 MG/DL (ref 6–23)
CALCIUM SERPL-MCNC: 9.3 MG/DL (ref 8.3–10.6)
CHLORIDE BLD-SCNC: 104 MMOL/L (ref 99–110)
CO2: 23 MMOL/L (ref 21–32)
CREAT SERPL-MCNC: 1.8 MG/DL (ref 0.9–1.3)
CRP SERPL HS-MCNC: 9.7 MG/L
DIFFERENTIAL TYPE: ABNORMAL
EOSINOPHILS ABSOLUTE: 0 K/CU MM
EOSINOPHILS RELATIVE PERCENT: 0.3 % (ref 0–3)
ERYTHROCYTE SEDIMENTATION RATE: 6 MM/HR (ref 0–20)
GFR SERPL CREATININE-BSD FRML MDRD: 39 ML/MIN/1.73M2
GLUCOSE SERPL-MCNC: 138 MG/DL (ref 70–99)
HCT VFR BLD CALC: 43.8 % (ref 42–52)
HEMOGLOBIN: 14.8 GM/DL (ref 13.5–18)
IMMATURE NEUTROPHIL %: 0.3 % (ref 0–0.43)
LACTIC ACID, SEPSIS: 1.1 MMOL/L (ref 0.5–1.9)
LACTIC ACID, SEPSIS: 3.1 MMOL/L (ref 0.5–1.9)
LIPASE: 25 IU/L (ref 13–60)
LYMPHOCYTES ABSOLUTE: 0.8 K/CU MM
LYMPHOCYTES RELATIVE PERCENT: 10.9 % (ref 24–44)
MCH RBC QN AUTO: 32.5 PG (ref 27–31)
MCHC RBC AUTO-ENTMCNC: 33.8 % (ref 32–36)
MCV RBC AUTO: 96.3 FL (ref 78–100)
MONOCYTES ABSOLUTE: 0.8 K/CU MM
MONOCYTES RELATIVE PERCENT: 10.9 % (ref 0–4)
NUCLEATED RBC %: 0 %
PDW BLD-RTO: 12.1 % (ref 11.7–14.9)
PLATELET # BLD: 206 K/CU MM (ref 140–440)
PMV BLD AUTO: 11 FL (ref 7.5–11.1)
POTASSIUM SERPL-SCNC: 4.4 MMOL/L (ref 3.5–5.1)
RBC # BLD: 4.55 M/CU MM (ref 4.6–6.2)
SEGMENTED NEUTROPHILS ABSOLUTE COUNT: 5.8 K/CU MM
SEGMENTED NEUTROPHILS RELATIVE PERCENT: 77.5 % (ref 36–66)
SODIUM BLD-SCNC: 143 MMOL/L (ref 135–145)
TOTAL IMMATURE NEUTOROPHIL: 0.02 K/CU MM
TOTAL NUCLEATED RBC: 0 K/CU MM
TOTAL PROTEIN: 7.4 GM/DL (ref 6.4–8.2)
WBC # BLD: 7.5 K/CU MM (ref 4–10.5)

## 2023-06-23 PROCEDURE — G0378 HOSPITAL OBSERVATION PER HR: HCPCS

## 2023-06-23 PROCEDURE — 99285 EMERGENCY DEPT VISIT HI MDM: CPT

## 2023-06-23 PROCEDURE — 74176 CT ABD & PELVIS W/O CONTRAST: CPT

## 2023-06-23 PROCEDURE — 6370000000 HC RX 637 (ALT 250 FOR IP): Performed by: PHYSICIAN ASSISTANT

## 2023-06-23 PROCEDURE — 83605 ASSAY OF LACTIC ACID: CPT

## 2023-06-23 PROCEDURE — 80053 COMPREHEN METABOLIC PANEL: CPT

## 2023-06-23 PROCEDURE — 85025 COMPLETE CBC W/AUTO DIFF WBC: CPT

## 2023-06-23 PROCEDURE — 2580000003 HC RX 258: Performed by: EMERGENCY MEDICINE

## 2023-06-23 PROCEDURE — 96375 TX/PRO/DX INJ NEW DRUG ADDON: CPT

## 2023-06-23 PROCEDURE — 96361 HYDRATE IV INFUSION ADD-ON: CPT

## 2023-06-23 PROCEDURE — 86140 C-REACTIVE PROTEIN: CPT

## 2023-06-23 PROCEDURE — 2580000003 HC RX 258: Performed by: PHYSICIAN ASSISTANT

## 2023-06-23 PROCEDURE — 96374 THER/PROPH/DIAG INJ IV PUSH: CPT

## 2023-06-23 PROCEDURE — 85652 RBC SED RATE AUTOMATED: CPT

## 2023-06-23 PROCEDURE — 6360000002 HC RX W HCPCS: Performed by: EMERGENCY MEDICINE

## 2023-06-23 PROCEDURE — 83690 ASSAY OF LIPASE: CPT

## 2023-06-23 PROCEDURE — 96376 TX/PRO/DX INJ SAME DRUG ADON: CPT

## 2023-06-23 RX ORDER — AMLODIPINE BESYLATE 5 MG/1
5 TABLET ORAL DAILY
Status: ON HOLD | COMMUNITY
End: 2023-06-30 | Stop reason: HOSPADM

## 2023-06-23 RX ORDER — ESOMEPRAZOLE MAGNESIUM 20 MG/1
20 FOR SUSPENSION ORAL DAILY
Status: DISCONTINUED | OUTPATIENT
Start: 2023-06-24 | End: 2023-06-23 | Stop reason: CLARIF

## 2023-06-23 RX ORDER — BUSPIRONE HYDROCHLORIDE 5 MG/1
5 TABLET ORAL DAILY
Status: DISCONTINUED | OUTPATIENT
Start: 2023-06-23 | End: 2023-06-30 | Stop reason: HOSPADM

## 2023-06-23 RX ORDER — ONDANSETRON 2 MG/ML
4 INJECTION INTRAMUSCULAR; INTRAVENOUS EVERY 6 HOURS PRN
Status: DISCONTINUED | OUTPATIENT
Start: 2023-06-23 | End: 2023-06-30 | Stop reason: HOSPADM

## 2023-06-23 RX ORDER — ACETAMINOPHEN 325 MG/1
650 TABLET ORAL EVERY 4 HOURS PRN
Status: DISCONTINUED | OUTPATIENT
Start: 2023-06-23 | End: 2023-06-30 | Stop reason: HOSPADM

## 2023-06-23 RX ORDER — PANTOPRAZOLE SODIUM 40 MG/1
40 TABLET, DELAYED RELEASE ORAL
Status: DISCONTINUED | OUTPATIENT
Start: 2023-06-24 | End: 2023-06-30 | Stop reason: HOSPADM

## 2023-06-23 RX ORDER — AMLODIPINE BESYLATE 5 MG/1
5 TABLET ORAL DAILY
Status: DISCONTINUED | OUTPATIENT
Start: 2023-06-23 | End: 2023-06-28

## 2023-06-23 RX ORDER — ONDANSETRON 2 MG/ML
4 INJECTION INTRAMUSCULAR; INTRAVENOUS ONCE
Status: DISCONTINUED | OUTPATIENT
Start: 2023-06-23 | End: 2023-06-30 | Stop reason: HOSPADM

## 2023-06-23 RX ORDER — 0.9 % SODIUM CHLORIDE 0.9 %
1000 INTRAVENOUS SOLUTION INTRAVENOUS ONCE
Status: COMPLETED | OUTPATIENT
Start: 2023-06-23 | End: 2023-06-23

## 2023-06-23 RX ORDER — MORPHINE SULFATE 4 MG/ML
4 INJECTION, SOLUTION INTRAMUSCULAR; INTRAVENOUS ONCE
Status: COMPLETED | OUTPATIENT
Start: 2023-06-23 | End: 2023-06-23

## 2023-06-23 RX ORDER — SODIUM CHLORIDE, SODIUM LACTATE, POTASSIUM CHLORIDE, CALCIUM CHLORIDE 600; 310; 30; 20 MG/100ML; MG/100ML; MG/100ML; MG/100ML
INJECTION, SOLUTION INTRAVENOUS CONTINUOUS
Status: DISPENSED | OUTPATIENT
Start: 2023-06-23 | End: 2023-06-24

## 2023-06-23 RX ORDER — ONDANSETRON 4 MG/1
4 TABLET, ORALLY DISINTEGRATING ORAL ONCE
Status: DISCONTINUED | OUTPATIENT
Start: 2023-06-23 | End: 2023-06-30 | Stop reason: HOSPADM

## 2023-06-23 RX ORDER — BUSPIRONE HYDROCHLORIDE 5 MG/1
5 TABLET ORAL DAILY
COMMUNITY
Start: 2023-06-16

## 2023-06-23 RX ADMIN — ONDANSETRON 4 MG: 2 INJECTION INTRAMUSCULAR; INTRAVENOUS at 08:51

## 2023-06-23 RX ADMIN — ONDANSETRON 4 MG: 2 INJECTION INTRAMUSCULAR; INTRAVENOUS at 21:18

## 2023-06-23 RX ADMIN — ACETAMINOPHEN 650 MG: 325 TABLET ORAL at 18:56

## 2023-06-23 RX ADMIN — BUSPIRONE HYDROCHLORIDE 5 MG: 5 TABLET ORAL at 18:52

## 2023-06-23 RX ADMIN — AMLODIPINE BESYLATE 5 MG: 5 TABLET ORAL at 18:52

## 2023-06-23 RX ADMIN — SODIUM CHLORIDE, POTASSIUM CHLORIDE, SODIUM LACTATE AND CALCIUM CHLORIDE: 600; 310; 30; 20 INJECTION, SOLUTION INTRAVENOUS at 13:57

## 2023-06-23 RX ADMIN — MORPHINE SULFATE 4 MG: 4 INJECTION, SOLUTION INTRAMUSCULAR; INTRAVENOUS at 08:51

## 2023-06-23 RX ADMIN — ACETAMINOPHEN 650 MG: 325 TABLET ORAL at 13:56

## 2023-06-23 RX ADMIN — SERTRALINE HYDROCHLORIDE 150 MG: 50 TABLET ORAL at 18:52

## 2023-06-23 RX ADMIN — SODIUM CHLORIDE 1000 ML: 9 INJECTION, SOLUTION INTRAVENOUS at 08:50

## 2023-06-23 ASSESSMENT — PAIN - FUNCTIONAL ASSESSMENT: PAIN_FUNCTIONAL_ASSESSMENT: 0-10

## 2023-06-23 ASSESSMENT — PAIN DESCRIPTION - LOCATION
LOCATION: ABDOMEN
LOCATION: HEAD
LOCATION: HEAD

## 2023-06-23 ASSESSMENT — PAIN DESCRIPTION - DESCRIPTORS
DESCRIPTORS: ACHING
DESCRIPTORS: PRESSURE;SHARP

## 2023-06-23 ASSESSMENT — PAIN DESCRIPTION - ORIENTATION: ORIENTATION: LEFT

## 2023-06-23 ASSESSMENT — PAIN SCALES - GENERAL
PAINLEVEL_OUTOF10: 6
PAINLEVEL_OUTOF10: 5
PAINLEVEL_OUTOF10: 5

## 2023-06-24 ENCOUNTER — APPOINTMENT (OUTPATIENT)
Dept: GENERAL RADIOLOGY | Age: 73
DRG: 386 | End: 2023-06-24
Payer: MEDICARE

## 2023-06-24 LAB
ANION GAP SERPL CALCULATED.3IONS-SCNC: 10 MMOL/L (ref 4–16)
BASOPHILS ABSOLUTE: 0.1 K/CU MM
BASOPHILS RELATIVE PERCENT: 0.7 % (ref 0–1)
BUN SERPL-MCNC: 20 MG/DL (ref 6–23)
CALCIUM SERPL-MCNC: 8.3 MG/DL (ref 8.3–10.6)
CHLORIDE BLD-SCNC: 101 MMOL/L (ref 99–110)
CO2: 25 MMOL/L (ref 21–32)
CREAT SERPL-MCNC: 1.4 MG/DL (ref 0.9–1.3)
DIFFERENTIAL TYPE: ABNORMAL
EOSINOPHILS ABSOLUTE: 0.1 K/CU MM
EOSINOPHILS RELATIVE PERCENT: 1.3 % (ref 0–3)
GFR SERPL CREATININE-BSD FRML MDRD: 53 ML/MIN/1.73M2
GLUCOSE SERPL-MCNC: 97 MG/DL (ref 70–99)
HCT VFR BLD CALC: 40.9 % (ref 42–52)
HEMOGLOBIN: 13.4 GM/DL (ref 13.5–18)
IMMATURE NEUTROPHIL %: 0.1 % (ref 0–0.43)
LYMPHOCYTES ABSOLUTE: 1.3 K/CU MM
LYMPHOCYTES RELATIVE PERCENT: 17.5 % (ref 24–44)
MCH RBC QN AUTO: 31.9 PG (ref 27–31)
MCHC RBC AUTO-ENTMCNC: 32.8 % (ref 32–36)
MCV RBC AUTO: 97.4 FL (ref 78–100)
MONOCYTES ABSOLUTE: 0.8 K/CU MM
MONOCYTES RELATIVE PERCENT: 11.2 % (ref 0–4)
NUCLEATED RBC %: 0 %
PDW BLD-RTO: 12.1 % (ref 11.7–14.9)
PLATELET # BLD: 171 K/CU MM (ref 140–440)
PMV BLD AUTO: 10.5 FL (ref 7.5–11.1)
POTASSIUM SERPL-SCNC: 3.6 MMOL/L (ref 3.5–5.1)
RBC # BLD: 4.2 M/CU MM (ref 4.6–6.2)
SEGMENTED NEUTROPHILS ABSOLUTE COUNT: 5.2 K/CU MM
SEGMENTED NEUTROPHILS RELATIVE PERCENT: 69.2 % (ref 36–66)
SODIUM BLD-SCNC: 136 MMOL/L (ref 135–145)
TOTAL IMMATURE NEUTOROPHIL: 0.01 K/CU MM
TOTAL NUCLEATED RBC: 0 K/CU MM
WBC # BLD: 7.4 K/CU MM (ref 4–10.5)

## 2023-06-24 PROCEDURE — 36415 COLL VENOUS BLD VENIPUNCTURE: CPT

## 2023-06-24 PROCEDURE — 96375 TX/PRO/DX INJ NEW DRUG ADDON: CPT

## 2023-06-24 PROCEDURE — 85025 COMPLETE CBC W/AUTO DIFF WBC: CPT

## 2023-06-24 PROCEDURE — 94761 N-INVAS EAR/PLS OXIMETRY MLT: CPT

## 2023-06-24 PROCEDURE — 6360000002 HC RX W HCPCS: Performed by: NURSE PRACTITIONER

## 2023-06-24 PROCEDURE — 74019 RADEX ABDOMEN 2 VIEWS: CPT

## 2023-06-24 PROCEDURE — 6370000000 HC RX 637 (ALT 250 FOR IP): Performed by: NURSE PRACTITIONER

## 2023-06-24 PROCEDURE — 1200000000 HC SEMI PRIVATE

## 2023-06-24 PROCEDURE — 96376 TX/PRO/DX INJ SAME DRUG ADON: CPT

## 2023-06-24 PROCEDURE — 2580000003 HC RX 258: Performed by: NURSE PRACTITIONER

## 2023-06-24 PROCEDURE — 6360000002 HC RX W HCPCS: Performed by: INTERNAL MEDICINE

## 2023-06-24 PROCEDURE — 80048 BASIC METABOLIC PNL TOTAL CA: CPT

## 2023-06-24 PROCEDURE — 96361 HYDRATE IV INFUSION ADD-ON: CPT

## 2023-06-24 RX ORDER — HYDROCODONE BITARTRATE AND ACETAMINOPHEN 5; 325 MG/1; MG/1
2 TABLET ORAL ONCE
Status: COMPLETED | OUTPATIENT
Start: 2023-06-24 | End: 2023-06-24

## 2023-06-24 RX ORDER — MORPHINE SULFATE 2 MG/ML
2 INJECTION, SOLUTION INTRAMUSCULAR; INTRAVENOUS EVERY 4 HOURS PRN
Status: DISCONTINUED | OUTPATIENT
Start: 2023-06-24 | End: 2023-06-30 | Stop reason: HOSPADM

## 2023-06-24 RX ORDER — SODIUM CHLORIDE 9 MG/ML
INJECTION, SOLUTION INTRAVENOUS CONTINUOUS
Status: DISCONTINUED | OUTPATIENT
Start: 2023-06-24 | End: 2023-06-25

## 2023-06-24 RX ADMIN — METHYLPREDNISOLONE SODIUM SUCCINATE 40 MG: 40 INJECTION, POWDER, FOR SOLUTION INTRAMUSCULAR; INTRAVENOUS at 12:03

## 2023-06-24 RX ADMIN — HYDROCODONE BITARTRATE AND ACETAMINOPHEN 2 TABLET: 5; 325 TABLET ORAL at 00:49

## 2023-06-24 RX ADMIN — SODIUM CHLORIDE: 9 INJECTION, SOLUTION INTRAVENOUS at 18:17

## 2023-06-24 RX ADMIN — MORPHINE SULFATE 2 MG: 2 INJECTION, SOLUTION INTRAMUSCULAR; INTRAVENOUS at 20:14

## 2023-06-24 RX ADMIN — METHYLPREDNISOLONE SODIUM SUCCINATE 40 MG: 40 INJECTION, POWDER, FOR SOLUTION INTRAMUSCULAR; INTRAVENOUS at 20:35

## 2023-06-24 RX ADMIN — SODIUM CHLORIDE: 9 INJECTION, SOLUTION INTRAVENOUS at 22:08

## 2023-06-24 RX ADMIN — MORPHINE SULFATE 2 MG: 2 INJECTION, SOLUTION INTRAMUSCULAR; INTRAVENOUS at 08:33

## 2023-06-24 RX ADMIN — SODIUM CHLORIDE: 9 INJECTION, SOLUTION INTRAVENOUS at 08:41

## 2023-06-24 RX ADMIN — PHENOL 1 SPRAY: 1.4 LIQUID ORAL at 15:03

## 2023-06-24 ASSESSMENT — PAIN DESCRIPTION - FREQUENCY
FREQUENCY: CONTINUOUS

## 2023-06-24 ASSESSMENT — PAIN DESCRIPTION - LOCATION
LOCATION: ABDOMEN;THROAT
LOCATION: ABDOMEN

## 2023-06-24 ASSESSMENT — PAIN DESCRIPTION - DESCRIPTORS
DESCRIPTORS: ACHING;DISCOMFORT;CRAMPING
DESCRIPTORS: ACHING
DESCRIPTORS: ACHING;DISCOMFORT;PRESSURE
DESCRIPTORS: ACHING;CRAMPING;DISCOMFORT
DESCRIPTORS: ACHING;DISCOMFORT;DULL
DESCRIPTORS: TIGHTNESS;DISCOMFORT;PRESSURE

## 2023-06-24 ASSESSMENT — PAIN - FUNCTIONAL ASSESSMENT
PAIN_FUNCTIONAL_ASSESSMENT: ACTIVITIES ARE NOT PREVENTED

## 2023-06-24 ASSESSMENT — PAIN DESCRIPTION - ORIENTATION
ORIENTATION: MID;LOWER;RIGHT;LEFT
ORIENTATION: UPPER
ORIENTATION: MID
ORIENTATION: UPPER
ORIENTATION: MID;UPPER
ORIENTATION: MID;LOWER;LEFT

## 2023-06-24 ASSESSMENT — PAIN DESCRIPTION - PAIN TYPE
TYPE: ACUTE PAIN

## 2023-06-24 ASSESSMENT — PAIN DESCRIPTION - ONSET
ONSET: ON-GOING

## 2023-06-24 ASSESSMENT — PAIN SCALES - GENERAL
PAINLEVEL_OUTOF10: 4
PAINLEVEL_OUTOF10: 7
PAINLEVEL_OUTOF10: 5
PAINLEVEL_OUTOF10: 5
PAINLEVEL_OUTOF10: 8
PAINLEVEL_OUTOF10: 7
PAINLEVEL_OUTOF10: 3
PAINLEVEL_OUTOF10: 4
PAINLEVEL_OUTOF10: 2
PAINLEVEL_OUTOF10: 5

## 2023-06-25 ENCOUNTER — APPOINTMENT (OUTPATIENT)
Dept: GENERAL RADIOLOGY | Age: 73
DRG: 386 | End: 2023-06-25
Payer: MEDICARE

## 2023-06-25 ENCOUNTER — APPOINTMENT (OUTPATIENT)
Dept: ULTRASOUND IMAGING | Age: 73
DRG: 386 | End: 2023-06-25
Payer: MEDICARE

## 2023-06-25 LAB
ALBUMIN SERPL-MCNC: 4.1 GM/DL (ref 3.4–5)
ALP BLD-CCNC: 120 IU/L (ref 40–128)
ALT SERPL-CCNC: 84 U/L (ref 10–40)
ANION GAP SERPL CALCULATED.3IONS-SCNC: 15 MMOL/L (ref 4–16)
AST SERPL-CCNC: 152 IU/L (ref 15–37)
BASOPHILS ABSOLUTE: 0 K/CU MM
BASOPHILS RELATIVE PERCENT: 0.1 % (ref 0–1)
BILIRUB SERPL-MCNC: 3.5 MG/DL (ref 0–1)
BUN SERPL-MCNC: 16 MG/DL (ref 6–23)
CALCIUM SERPL-MCNC: 8.6 MG/DL (ref 8.3–10.6)
CHLORIDE BLD-SCNC: 99 MMOL/L (ref 99–110)
CO2: 23 MMOL/L (ref 21–32)
CREAT SERPL-MCNC: 1.2 MG/DL (ref 0.9–1.3)
DIFFERENTIAL TYPE: ABNORMAL
EOSINOPHILS ABSOLUTE: 0 K/CU MM
EOSINOPHILS RELATIVE PERCENT: 0 % (ref 0–3)
GFR SERPL CREATININE-BSD FRML MDRD: >60 ML/MIN/1.73M2
GLUCOSE BLD-MCNC: 110 MG/DL (ref 70–99)
GLUCOSE BLD-MCNC: 114 MG/DL (ref 70–99)
GLUCOSE BLD-MCNC: 162 MG/DL (ref 70–99)
GLUCOSE SERPL-MCNC: 98 MG/DL (ref 70–99)
HCT VFR BLD CALC: 39.1 % (ref 42–52)
HEMOGLOBIN: 13.5 GM/DL (ref 13.5–18)
IMMATURE NEUTROPHIL %: 0.5 % (ref 0–0.43)
LYMPHOCYTES ABSOLUTE: 0.4 K/CU MM
LYMPHOCYTES RELATIVE PERCENT: 5.1 % (ref 24–44)
MCH RBC QN AUTO: 32.8 PG (ref 27–31)
MCHC RBC AUTO-ENTMCNC: 34.5 % (ref 32–36)
MCV RBC AUTO: 94.9 FL (ref 78–100)
MONOCYTES ABSOLUTE: 0.4 K/CU MM
MONOCYTES RELATIVE PERCENT: 4.8 % (ref 0–4)
NUCLEATED RBC %: 0 %
PDW BLD-RTO: 11.8 % (ref 11.7–14.9)
PLATELET # BLD: 160 K/CU MM (ref 140–440)
PMV BLD AUTO: 11.7 FL (ref 7.5–11.1)
POTASSIUM SERPL-SCNC: 4.1 MMOL/L (ref 3.5–5.1)
RBC # BLD: 4.12 M/CU MM (ref 4.6–6.2)
SEGMENTED NEUTROPHILS ABSOLUTE COUNT: 6.7 K/CU MM
SEGMENTED NEUTROPHILS RELATIVE PERCENT: 89.5 % (ref 36–66)
SODIUM BLD-SCNC: 137 MMOL/L (ref 135–145)
TOTAL IMMATURE NEUTOROPHIL: 0.04 K/CU MM
TOTAL NUCLEATED RBC: 0 K/CU MM
TOTAL PROTEIN: 6.4 GM/DL (ref 6.4–8.2)
WBC # BLD: 7.5 K/CU MM (ref 4–10.5)

## 2023-06-25 PROCEDURE — 6360000002 HC RX W HCPCS: Performed by: INTERNAL MEDICINE

## 2023-06-25 PROCEDURE — 6360000002 HC RX W HCPCS: Performed by: NURSE PRACTITIONER

## 2023-06-25 PROCEDURE — 80053 COMPREHEN METABOLIC PANEL: CPT

## 2023-06-25 PROCEDURE — 82962 GLUCOSE BLOOD TEST: CPT

## 2023-06-25 PROCEDURE — 76937 US GUIDE VASCULAR ACCESS: CPT

## 2023-06-25 PROCEDURE — 85025 COMPLETE CBC W/AUTO DIFF WBC: CPT

## 2023-06-25 PROCEDURE — 1200000000 HC SEMI PRIVATE

## 2023-06-25 PROCEDURE — 36415 COLL VENOUS BLD VENIPUNCTURE: CPT

## 2023-06-25 PROCEDURE — 93005 ELECTROCARDIOGRAM TRACING: CPT | Performed by: NURSE PRACTITIONER

## 2023-06-25 PROCEDURE — 2500000003 HC RX 250 WO HCPCS: Performed by: NURSE PRACTITIONER

## 2023-06-25 PROCEDURE — 94761 N-INVAS EAR/PLS OXIMETRY MLT: CPT

## 2023-06-25 PROCEDURE — C9113 INJ PANTOPRAZOLE SODIUM, VIA: HCPCS | Performed by: NURSE PRACTITIONER

## 2023-06-25 PROCEDURE — 74019 RADEX ABDOMEN 2 VIEWS: CPT

## 2023-06-25 PROCEDURE — 76705 ECHO EXAM OF ABDOMEN: CPT

## 2023-06-25 PROCEDURE — 2580000003 HC RX 258: Performed by: NURSE PRACTITIONER

## 2023-06-25 RX ORDER — CIPROFLOXACIN 2 MG/ML
400 INJECTION, SOLUTION INTRAVENOUS EVERY 12 HOURS
Status: DISCONTINUED | OUTPATIENT
Start: 2023-06-25 | End: 2023-06-26

## 2023-06-25 RX ORDER — PANTOPRAZOLE SODIUM 40 MG/10ML
40 INJECTION, POWDER, LYOPHILIZED, FOR SOLUTION INTRAVENOUS DAILY
Status: DISCONTINUED | OUTPATIENT
Start: 2023-06-25 | End: 2023-06-30 | Stop reason: HOSPADM

## 2023-06-25 RX ORDER — DEXTROSE AND SODIUM CHLORIDE 5; .9 G/100ML; G/100ML
INJECTION, SOLUTION INTRAVENOUS CONTINUOUS
Status: DISCONTINUED | OUTPATIENT
Start: 2023-06-25 | End: 2023-06-29

## 2023-06-25 RX ORDER — METRONIDAZOLE 500 MG/100ML
500 INJECTION, SOLUTION INTRAVENOUS EVERY 8 HOURS
Status: DISCONTINUED | OUTPATIENT
Start: 2023-06-25 | End: 2023-06-26

## 2023-06-25 RX ORDER — METOPROLOL TARTRATE 5 MG/5ML
5 INJECTION INTRAVENOUS EVERY 12 HOURS
Status: DISCONTINUED | OUTPATIENT
Start: 2023-06-25 | End: 2023-06-25 | Stop reason: SDUPTHER

## 2023-06-25 RX ORDER — METOPROLOL TARTRATE 5 MG/5ML
5 INJECTION INTRAVENOUS EVERY 12 HOURS
Status: DISCONTINUED | OUTPATIENT
Start: 2023-06-25 | End: 2023-06-27

## 2023-06-25 RX ORDER — ACETAMINOPHEN 650 MG/1
650 SUPPOSITORY RECTAL EVERY 4 HOURS PRN
Status: DISCONTINUED | OUTPATIENT
Start: 2023-06-25 | End: 2023-06-30 | Stop reason: HOSPADM

## 2023-06-25 RX ADMIN — CIPROFLOXACIN 400 MG: 2 INJECTION, SOLUTION INTRAVENOUS at 21:38

## 2023-06-25 RX ADMIN — MORPHINE SULFATE 2 MG: 2 INJECTION, SOLUTION INTRAMUSCULAR; INTRAVENOUS at 18:54

## 2023-06-25 RX ADMIN — METHYLPREDNISOLONE SODIUM SUCCINATE 40 MG: 40 INJECTION, POWDER, FOR SOLUTION INTRAMUSCULAR; INTRAVENOUS at 04:06

## 2023-06-25 RX ADMIN — MORPHINE SULFATE 2 MG: 2 INJECTION, SOLUTION INTRAMUSCULAR; INTRAVENOUS at 10:14

## 2023-06-25 RX ADMIN — METOPROLOL TARTRATE 5 MG: 5 INJECTION INTRAVENOUS at 18:33

## 2023-06-25 RX ADMIN — METHYLPREDNISOLONE SODIUM SUCCINATE 40 MG: 40 INJECTION, POWDER, FOR SOLUTION INTRAMUSCULAR; INTRAVENOUS at 16:41

## 2023-06-25 RX ADMIN — DEXTROSE AND SODIUM CHLORIDE: 5; 900 INJECTION, SOLUTION INTRAVENOUS at 16:03

## 2023-06-25 RX ADMIN — MORPHINE SULFATE 2 MG: 2 INJECTION, SOLUTION INTRAMUSCULAR; INTRAVENOUS at 23:10

## 2023-06-25 RX ADMIN — METRONIDAZOLE 500 MG: 500 INJECTION, SOLUTION INTRAVENOUS at 21:51

## 2023-06-25 RX ADMIN — PANTOPRAZOLE SODIUM 40 MG: 40 INJECTION, POWDER, FOR SOLUTION INTRAVENOUS at 21:44

## 2023-06-25 RX ADMIN — MORPHINE SULFATE 2 MG: 2 INJECTION, SOLUTION INTRAMUSCULAR; INTRAVENOUS at 14:37

## 2023-06-25 ASSESSMENT — PAIN DESCRIPTION - PAIN TYPE: TYPE: ACUTE PAIN

## 2023-06-25 ASSESSMENT — PAIN SCALES - GENERAL
PAINLEVEL_OUTOF10: 2
PAINLEVEL_OUTOF10: 4
PAINLEVEL_OUTOF10: 4
PAINLEVEL_OUTOF10: 1
PAINLEVEL_OUTOF10: 3

## 2023-06-25 ASSESSMENT — PAIN DESCRIPTION - LOCATION
LOCATION: ABDOMEN
LOCATION: ABDOMEN

## 2023-06-25 ASSESSMENT — PAIN DESCRIPTION - FREQUENCY: FREQUENCY: INTERMITTENT

## 2023-06-25 ASSESSMENT — PAIN - FUNCTIONAL ASSESSMENT
PAIN_FUNCTIONAL_ASSESSMENT: ACTIVITIES ARE NOT PREVENTED
PAIN_FUNCTIONAL_ASSESSMENT: ACTIVITIES ARE NOT PREVENTED

## 2023-06-25 ASSESSMENT — PAIN DESCRIPTION - ORIENTATION: ORIENTATION: MID

## 2023-06-25 ASSESSMENT — PAIN DESCRIPTION - DESCRIPTORS
DESCRIPTORS: PRESSURE;ACHING
DESCRIPTORS: PRESSURE;ACHING

## 2023-06-26 ENCOUNTER — ANESTHESIA (OUTPATIENT)
Dept: ENDOSCOPY | Age: 73
End: 2023-06-26
Payer: MEDICARE

## 2023-06-26 ENCOUNTER — ANESTHESIA EVENT (OUTPATIENT)
Dept: ENDOSCOPY | Age: 73
End: 2023-06-26
Payer: MEDICARE

## 2023-06-26 ENCOUNTER — APPOINTMENT (OUTPATIENT)
Dept: GENERAL RADIOLOGY | Age: 73
DRG: 386 | End: 2023-06-26
Payer: MEDICARE

## 2023-06-26 ENCOUNTER — APPOINTMENT (OUTPATIENT)
Dept: MRI IMAGING | Age: 73
DRG: 386 | End: 2023-06-26
Payer: MEDICARE

## 2023-06-26 PROBLEM — K91.2 SHORT GUT SYNDROME: Status: ACTIVE | Noted: 2023-06-26

## 2023-06-26 PROBLEM — K90.829 SHORT GUT SYNDROME: Status: ACTIVE | Noted: 2023-06-26

## 2023-06-26 PROBLEM — K80.43 CALCULUS OF BILE DUCT WITH ACUTE CHOLECYSTITIS AND OBSTRUCTION: Status: ACTIVE | Noted: 2023-06-26

## 2023-06-26 PROBLEM — K50.012 CROHN'S DISEASE OF SMALL INTESTINE WITH INTESTINAL OBSTRUCTION (HCC): Status: ACTIVE | Noted: 2021-01-05

## 2023-06-26 LAB
ALBUMIN SERPL-MCNC: 3.4 GM/DL (ref 3.4–5)
ALP BLD-CCNC: 281 IU/L (ref 40–128)
ALT SERPL-CCNC: 179 U/L (ref 10–40)
ANION GAP SERPL CALCULATED.3IONS-SCNC: 12 MMOL/L (ref 4–16)
AST SERPL-CCNC: 291 IU/L (ref 15–37)
BACTERIA: NEGATIVE /HPF
BASOPHILS ABSOLUTE: 0 K/CU MM
BASOPHILS RELATIVE PERCENT: 0.1 % (ref 0–1)
BILIRUB SERPL-MCNC: 5.2 MG/DL (ref 0–1)
BILIRUBIN URINE: ABNORMAL MG/DL
BLOOD, URINE: NEGATIVE
BUN SERPL-MCNC: 20 MG/DL (ref 6–23)
CALCIUM SERPL-MCNC: 8.1 MG/DL (ref 8.3–10.6)
CHLORIDE BLD-SCNC: 101 MMOL/L (ref 99–110)
CLARITY: CLEAR
CO2: 26 MMOL/L (ref 21–32)
COLOR: YELLOW
CREAT SERPL-MCNC: 1.3 MG/DL (ref 0.9–1.3)
CRP SERPL HS-MCNC: 162.7 MG/L
DIFFERENTIAL TYPE: ABNORMAL
EOSINOPHILS ABSOLUTE: 0 K/CU MM
EOSINOPHILS RELATIVE PERCENT: 0 % (ref 0–3)
GFR SERPL CREATININE-BSD FRML MDRD: 58 ML/MIN/1.73M2
GLUCOSE BLD-MCNC: 131 MG/DL (ref 70–99)
GLUCOSE BLD-MCNC: 131 MG/DL (ref 70–99)
GLUCOSE BLD-MCNC: 136 MG/DL (ref 70–99)
GLUCOSE BLD-MCNC: 142 MG/DL (ref 70–99)
GLUCOSE SERPL-MCNC: 123 MG/DL (ref 70–99)
GLUCOSE, URINE: NEGATIVE MG/DL
HAV IGM SERPL QL IA: NON REACTIVE
HBV CORE IGM SERPL QL IA: NON REACTIVE
HBV SURFACE AG SERPL QL IA: NON REACTIVE
HCT VFR BLD CALC: 36.6 % (ref 42–52)
HCV AB SERPL QL IA: NON REACTIVE
HEMOGLOBIN: 12 GM/DL (ref 13.5–18)
IMMATURE NEUTROPHIL %: 0.3 % (ref 0–0.43)
KETONES, URINE: ABNORMAL MG/DL
LACTATE: 1.3 MMOL/L (ref 0.5–1.9)
LEUKOCYTE ESTERASE, URINE: NEGATIVE
LYMPHOCYTES ABSOLUTE: 0.3 K/CU MM
LYMPHOCYTES RELATIVE PERCENT: 2.9 % (ref 24–44)
MCH RBC QN AUTO: 32 PG (ref 27–31)
MCHC RBC AUTO-ENTMCNC: 32.8 % (ref 32–36)
MCV RBC AUTO: 97.6 FL (ref 78–100)
MONOCYTES ABSOLUTE: 0.5 K/CU MM
MONOCYTES RELATIVE PERCENT: 4.4 % (ref 0–4)
MUCUS: ABNORMAL HPF
NITRITE URINE, QUANTITATIVE: POSITIVE
NUCLEATED RBC %: 0 %
PDW BLD-RTO: 12.2 % (ref 11.7–14.9)
PH, URINE: 7 (ref 5–8)
PLATELET # BLD: 128 K/CU MM (ref 140–440)
PMV BLD AUTO: 11.1 FL (ref 7.5–11.1)
POTASSIUM SERPL-SCNC: 3.4 MMOL/L (ref 3.5–5.1)
PROCALCITONIN SERPL-MCNC: 5.41 NG/ML
PROTEIN UA: 100 MG/DL
RBC # BLD: 3.75 M/CU MM (ref 4.6–6.2)
RBC URINE: 1 /HPF (ref 0–3)
SEGMENTED NEUTROPHILS ABSOLUTE COUNT: 11 K/CU MM
SEGMENTED NEUTROPHILS RELATIVE PERCENT: 92.3 % (ref 36–66)
SODIUM BLD-SCNC: 139 MMOL/L (ref 135–145)
SPECIFIC GRAVITY UA: >1.03 (ref 1–1.03)
SQUAMOUS EPITHELIAL: <1 /HPF
TOTAL IMMATURE NEUTOROPHIL: 0.04 K/CU MM
TOTAL NUCLEATED RBC: 0 K/CU MM
TOTAL PROTEIN: 5.3 GM/DL (ref 6.4–8.2)
TRICHOMONAS: ABNORMAL /HPF
UROBILINOGEN, URINE: 4 MG/DL (ref 0.2–1)
WBC # BLD: 11.9 K/CU MM (ref 4–10.5)
WBC UA: <1 /HPF (ref 0–2)

## 2023-06-26 PROCEDURE — 80053 COMPREHEN METABOLIC PANEL: CPT

## 2023-06-26 PROCEDURE — 2720000010 HC SURG SUPPLY STERILE: Performed by: INTERNAL MEDICINE

## 2023-06-26 PROCEDURE — 6370000000 HC RX 637 (ALT 250 FOR IP): Performed by: INTERNAL MEDICINE

## 2023-06-26 PROCEDURE — 2500000003 HC RX 250 WO HCPCS: Performed by: NURSE ANESTHETIST, CERTIFIED REGISTERED

## 2023-06-26 PROCEDURE — 81001 URINALYSIS AUTO W/SCOPE: CPT

## 2023-06-26 PROCEDURE — 86665 EPSTEIN-BARR CAPSID VCA: CPT

## 2023-06-26 PROCEDURE — 2580000003 HC RX 258: Performed by: NURSE PRACTITIONER

## 2023-06-26 PROCEDURE — 6360000002 HC RX W HCPCS: Performed by: NURSE ANESTHETIST, CERTIFIED REGISTERED

## 2023-06-26 PROCEDURE — 3700000000 HC ANESTHESIA ATTENDED CARE: Performed by: INTERNAL MEDICINE

## 2023-06-26 PROCEDURE — 86140 C-REACTIVE PROTEIN: CPT

## 2023-06-26 PROCEDURE — 2580000003 HC RX 258: Performed by: STUDENT IN AN ORGANIZED HEALTH CARE EDUCATION/TRAINING PROGRAM

## 2023-06-26 PROCEDURE — C1769 GUIDE WIRE: HCPCS | Performed by: INTERNAL MEDICINE

## 2023-06-26 PROCEDURE — 94761 N-INVAS EAR/PLS OXIMETRY MLT: CPT

## 2023-06-26 PROCEDURE — 87040 BLOOD CULTURE FOR BACTERIA: CPT

## 2023-06-26 PROCEDURE — 85025 COMPLETE CBC W/AUTO DIFF WBC: CPT

## 2023-06-26 PROCEDURE — 74181 MRI ABDOMEN W/O CONTRAST: CPT

## 2023-06-26 PROCEDURE — 76000 FLUOROSCOPY <1 HR PHYS/QHP: CPT

## 2023-06-26 PROCEDURE — 3700000001 HC ADD 15 MINUTES (ANESTHESIA): Performed by: INTERNAL MEDICINE

## 2023-06-26 PROCEDURE — 6360000002 HC RX W HCPCS: Performed by: ANESTHESIOLOGY

## 2023-06-26 PROCEDURE — 7100000001 HC PACU RECOVERY - ADDTL 15 MIN: Performed by: INTERNAL MEDICINE

## 2023-06-26 PROCEDURE — 36415 COLL VENOUS BLD VENIPUNCTURE: CPT

## 2023-06-26 PROCEDURE — 3609014900 HC ERCP W/SPHINCTEROTOMY &/OR PAPILLOTOMY: Performed by: INTERNAL MEDICINE

## 2023-06-26 PROCEDURE — 6360000002 HC RX W HCPCS: Performed by: STUDENT IN AN ORGANIZED HEALTH CARE EDUCATION/TRAINING PROGRAM

## 2023-06-26 PROCEDURE — 6360000002 HC RX W HCPCS: Performed by: INTERNAL MEDICINE

## 2023-06-26 PROCEDURE — 80074 ACUTE HEPATITIS PANEL: CPT

## 2023-06-26 PROCEDURE — 2709999900 HC NON-CHARGEABLE SUPPLY: Performed by: INTERNAL MEDICINE

## 2023-06-26 PROCEDURE — 86663 EPSTEIN-BARR ANTIBODY: CPT

## 2023-06-26 PROCEDURE — 2500000003 HC RX 250 WO HCPCS: Performed by: NURSE PRACTITIONER

## 2023-06-26 PROCEDURE — 82962 GLUCOSE BLOOD TEST: CPT

## 2023-06-26 PROCEDURE — 0FC98ZZ EXTIRPATION OF MATTER FROM COMMON BILE DUCT, VIA NATURAL OR ARTIFICIAL OPENING ENDOSCOPIC: ICD-10-PCS | Performed by: INTERNAL MEDICINE

## 2023-06-26 PROCEDURE — 6360000004 HC RX CONTRAST MEDICATION: Performed by: INTERNAL MEDICINE

## 2023-06-26 PROCEDURE — 84145 PROCALCITONIN (PCT): CPT

## 2023-06-26 PROCEDURE — C1726 CATH, BAL DIL, NON-VASCULAR: HCPCS | Performed by: INTERNAL MEDICINE

## 2023-06-26 PROCEDURE — 86664 EPSTEIN-BARR NUCLEAR ANTIGEN: CPT

## 2023-06-26 PROCEDURE — 6360000002 HC RX W HCPCS: Performed by: NURSE PRACTITIONER

## 2023-06-26 PROCEDURE — 86644 CMV ANTIBODY: CPT

## 2023-06-26 PROCEDURE — 2580000003 HC RX 258: Performed by: ANESTHESIOLOGY

## 2023-06-26 PROCEDURE — 99223 1ST HOSP IP/OBS HIGH 75: CPT | Performed by: SURGERY

## 2023-06-26 PROCEDURE — 83605 ASSAY OF LACTIC ACID: CPT

## 2023-06-26 PROCEDURE — C9113 INJ PANTOPRAZOLE SODIUM, VIA: HCPCS | Performed by: NURSE PRACTITIONER

## 2023-06-26 PROCEDURE — 86645 CMV ANTIBODY IGM: CPT

## 2023-06-26 PROCEDURE — 1200000000 HC SEMI PRIVATE

## 2023-06-26 PROCEDURE — 7100000000 HC PACU RECOVERY - FIRST 15 MIN: Performed by: INTERNAL MEDICINE

## 2023-06-26 RX ORDER — LABETALOL HYDROCHLORIDE 5 MG/ML
10 INJECTION, SOLUTION INTRAVENOUS
Status: DISCONTINUED | OUTPATIENT
Start: 2023-06-26 | End: 2023-06-26 | Stop reason: HOSPADM

## 2023-06-26 RX ORDER — ONDANSETRON 2 MG/ML
4 INJECTION INTRAMUSCULAR; INTRAVENOUS
Status: COMPLETED | OUTPATIENT
Start: 2023-06-26 | End: 2023-06-26

## 2023-06-26 RX ORDER — SODIUM CHLORIDE 0.9 % (FLUSH) 0.9 %
5-40 SYRINGE (ML) INJECTION PRN
Status: DISCONTINUED | OUTPATIENT
Start: 2023-06-26 | End: 2023-06-26 | Stop reason: HOSPADM

## 2023-06-26 RX ORDER — LIDOCAINE HYDROCHLORIDE 20 MG/ML
INJECTION, SOLUTION EPIDURAL; INFILTRATION; INTRACAUDAL; PERINEURAL PRN
Status: DISCONTINUED | OUTPATIENT
Start: 2023-06-26 | End: 2023-06-26 | Stop reason: SDUPTHER

## 2023-06-26 RX ORDER — LABETALOL HYDROCHLORIDE 5 MG/ML
10 INJECTION, SOLUTION INTRAVENOUS EVERY 6 HOURS PRN
Status: DISCONTINUED | OUTPATIENT
Start: 2023-06-26 | End: 2023-06-30 | Stop reason: HOSPADM

## 2023-06-26 RX ORDER — SODIUM CHLORIDE 0.9 % (FLUSH) 0.9 %
5-40 SYRINGE (ML) INJECTION EVERY 12 HOURS SCHEDULED
Status: DISCONTINUED | OUTPATIENT
Start: 2023-06-26 | End: 2023-06-26 | Stop reason: HOSPADM

## 2023-06-26 RX ORDER — POTASSIUM CHLORIDE 7.45 MG/ML
10 INJECTION INTRAVENOUS
Status: ACTIVE | OUTPATIENT
Start: 2023-06-26 | End: 2023-06-26

## 2023-06-26 RX ORDER — SUCCINYLCHOLINE CHLORIDE 20 MG/ML
INJECTION INTRAMUSCULAR; INTRAVENOUS PRN
Status: DISCONTINUED | OUTPATIENT
Start: 2023-06-26 | End: 2023-06-26 | Stop reason: SDUPTHER

## 2023-06-26 RX ORDER — SODIUM CHLORIDE, SODIUM LACTATE, POTASSIUM CHLORIDE, CALCIUM CHLORIDE 600; 310; 30; 20 MG/100ML; MG/100ML; MG/100ML; MG/100ML
INJECTION, SOLUTION INTRAVENOUS CONTINUOUS
Status: DISCONTINUED | OUTPATIENT
Start: 2023-06-26 | End: 2023-06-26

## 2023-06-26 RX ORDER — DEXAMETHASONE SODIUM PHOSPHATE 4 MG/ML
INJECTION, SOLUTION INTRA-ARTICULAR; INTRALESIONAL; INTRAMUSCULAR; INTRAVENOUS; SOFT TISSUE PRN
Status: DISCONTINUED | OUTPATIENT
Start: 2023-06-26 | End: 2023-06-26 | Stop reason: SDUPTHER

## 2023-06-26 RX ORDER — ONDANSETRON 2 MG/ML
INJECTION INTRAMUSCULAR; INTRAVENOUS PRN
Status: DISCONTINUED | OUTPATIENT
Start: 2023-06-26 | End: 2023-06-26 | Stop reason: SDUPTHER

## 2023-06-26 RX ORDER — POTASSIUM CHLORIDE 20 MEQ/1
40 TABLET, EXTENDED RELEASE ORAL ONCE
Status: DISCONTINUED | OUTPATIENT
Start: 2023-06-26 | End: 2023-06-30 | Stop reason: HOSPADM

## 2023-06-26 RX ORDER — DIPHENHYDRAMINE HYDROCHLORIDE 50 MG/ML
12.5 INJECTION INTRAMUSCULAR; INTRAVENOUS
Status: DISCONTINUED | OUTPATIENT
Start: 2023-06-26 | End: 2023-06-26 | Stop reason: HOSPADM

## 2023-06-26 RX ORDER — DROPERIDOL 2.5 MG/ML
0.62 INJECTION, SOLUTION INTRAMUSCULAR; INTRAVENOUS EVERY 10 MIN PRN
Status: DISCONTINUED | OUTPATIENT
Start: 2023-06-26 | End: 2023-06-26 | Stop reason: HOSPADM

## 2023-06-26 RX ORDER — OXYCODONE HYDROCHLORIDE 5 MG/1
5 TABLET ORAL
Status: DISCONTINUED | OUTPATIENT
Start: 2023-06-26 | End: 2023-06-26 | Stop reason: HOSPADM

## 2023-06-26 RX ORDER — FENTANYL CITRATE 50 UG/ML
25 INJECTION, SOLUTION INTRAMUSCULAR; INTRAVENOUS EVERY 5 MIN PRN
Status: DISCONTINUED | OUTPATIENT
Start: 2023-06-26 | End: 2023-06-26 | Stop reason: HOSPADM

## 2023-06-26 RX ORDER — POTASSIUM CHLORIDE 7.45 MG/ML
10 INJECTION INTRAVENOUS
Status: DISPENSED | OUTPATIENT
Start: 2023-06-26 | End: 2023-06-26

## 2023-06-26 RX ORDER — PROPOFOL 10 MG/ML
INJECTION, EMULSION INTRAVENOUS PRN
Status: DISCONTINUED | OUTPATIENT
Start: 2023-06-26 | End: 2023-06-26 | Stop reason: SDUPTHER

## 2023-06-26 RX ORDER — ROCURONIUM BROMIDE 10 MG/ML
INJECTION, SOLUTION INTRAVENOUS PRN
Status: DISCONTINUED | OUTPATIENT
Start: 2023-06-26 | End: 2023-06-26 | Stop reason: SDUPTHER

## 2023-06-26 RX ORDER — POTASSIUM CHLORIDE 7.45 MG/ML
10 INJECTION INTRAVENOUS ONCE
Status: COMPLETED | OUTPATIENT
Start: 2023-06-26 | End: 2023-06-26

## 2023-06-26 RX ORDER — HYDRALAZINE HYDROCHLORIDE 20 MG/ML
10 INJECTION INTRAMUSCULAR; INTRAVENOUS
Status: DISCONTINUED | OUTPATIENT
Start: 2023-06-26 | End: 2023-06-26 | Stop reason: HOSPADM

## 2023-06-26 RX ADMIN — MORPHINE SULFATE 2 MG: 2 INJECTION, SOLUTION INTRAMUSCULAR; INTRAVENOUS at 18:34

## 2023-06-26 RX ADMIN — PIPERACILLIN AND TAZOBACTAM 3375 MG: 3; .375 INJECTION, POWDER, LYOPHILIZED, FOR SOLUTION INTRAVENOUS at 13:15

## 2023-06-26 RX ADMIN — DEXTROSE AND SODIUM CHLORIDE: 5; 900 INJECTION, SOLUTION INTRAVENOUS at 10:34

## 2023-06-26 RX ADMIN — ONDANSETRON 4 MG: 2 INJECTION INTRAMUSCULAR; INTRAVENOUS at 15:52

## 2023-06-26 RX ADMIN — DEXAMETHASONE SODIUM PHOSPHATE 4 MG: 4 INJECTION, SOLUTION INTRAMUSCULAR; INTRAVENOUS at 15:52

## 2023-06-26 RX ADMIN — METHYLPREDNISOLONE SODIUM SUCCINATE 40 MG: 40 INJECTION, POWDER, FOR SOLUTION INTRAMUSCULAR; INTRAVENOUS at 18:12

## 2023-06-26 RX ADMIN — SUGAMMADEX 200 MG: 100 INJECTION, SOLUTION INTRAVENOUS at 15:57

## 2023-06-26 RX ADMIN — MORPHINE SULFATE 2 MG: 2 INJECTION, SOLUTION INTRAMUSCULAR; INTRAVENOUS at 09:02

## 2023-06-26 RX ADMIN — POTASSIUM CHLORIDE 10 MEQ: 7.46 INJECTION, SOLUTION INTRAVENOUS at 10:31

## 2023-06-26 RX ADMIN — POTASSIUM CHLORIDE 10 MEQ: 7.46 INJECTION, SOLUTION INTRAVENOUS at 18:09

## 2023-06-26 RX ADMIN — CIPROFLOXACIN 400 MG: 2 INJECTION, SOLUTION INTRAVENOUS at 06:13

## 2023-06-26 RX ADMIN — DEXTROSE AND SODIUM CHLORIDE: 5; 900 INJECTION, SOLUTION INTRAVENOUS at 01:27

## 2023-06-26 RX ADMIN — METOPROLOL TARTRATE 5 MG: 5 INJECTION INTRAVENOUS at 06:13

## 2023-06-26 RX ADMIN — METHYLPREDNISOLONE SODIUM SUCCINATE 40 MG: 40 INJECTION, POWDER, FOR SOLUTION INTRAMUSCULAR; INTRAVENOUS at 04:55

## 2023-06-26 RX ADMIN — DEXTROSE AND SODIUM CHLORIDE: 5; 900 INJECTION, SOLUTION INTRAVENOUS at 18:07

## 2023-06-26 RX ADMIN — POTASSIUM CHLORIDE 10 MEQ: 7.46 INJECTION, SOLUTION INTRAVENOUS at 12:41

## 2023-06-26 RX ADMIN — PIPERACILLIN AND TAZOBACTAM 3375 MG: 3; .375 INJECTION, POWDER, LYOPHILIZED, FOR SOLUTION INTRAVENOUS at 22:19

## 2023-06-26 RX ADMIN — ONDANSETRON 4 MG: 2 INJECTION INTRAMUSCULAR; INTRAVENOUS at 16:36

## 2023-06-26 RX ADMIN — PROPOFOL 200 MG: 10 INJECTION, EMULSION INTRAVENOUS at 14:58

## 2023-06-26 RX ADMIN — HYDROMORPHONE HYDROCHLORIDE 0.5 MG: 1 INJECTION, SOLUTION INTRAMUSCULAR; INTRAVENOUS; SUBCUTANEOUS at 17:27

## 2023-06-26 RX ADMIN — INDOMETHACIN 100 MG: 50 SUPPOSITORY RECTAL at 15:09

## 2023-06-26 RX ADMIN — ROCURONIUM BROMIDE 50 MG: 10 INJECTION INTRAVENOUS at 15:01

## 2023-06-26 RX ADMIN — METOPROLOL TARTRATE 5 MG: 5 INJECTION INTRAVENOUS at 18:14

## 2023-06-26 RX ADMIN — POTASSIUM CHLORIDE 10 MEQ: 7.46 INJECTION, SOLUTION INTRAVENOUS at 11:40

## 2023-06-26 RX ADMIN — SUCCINYLCHOLINE CHLORIDE 100 MG: 20 INJECTION, SOLUTION INTRAMUSCULAR; INTRAVENOUS at 14:58

## 2023-06-26 RX ADMIN — PANTOPRAZOLE SODIUM 40 MG: 40 INJECTION, POWDER, FOR SOLUTION INTRAVENOUS at 08:58

## 2023-06-26 RX ADMIN — HYDRALAZINE HYDROCHLORIDE 10 MG: 20 INJECTION INTRAMUSCULAR; INTRAVENOUS at 16:49

## 2023-06-26 RX ADMIN — FENTANYL CITRATE 25 MCG: 50 INJECTION, SOLUTION INTRAMUSCULAR; INTRAVENOUS at 16:42

## 2023-06-26 RX ADMIN — METRONIDAZOLE 500 MG: 500 INJECTION, SOLUTION INTRAVENOUS at 05:02

## 2023-06-26 RX ADMIN — VANCOMYCIN HYDROCHLORIDE 2000 MG: 5 INJECTION, POWDER, LYOPHILIZED, FOR SOLUTION INTRAVENOUS at 18:20

## 2023-06-26 RX ADMIN — SODIUM CHLORIDE, POTASSIUM CHLORIDE, SODIUM LACTATE AND CALCIUM CHLORIDE: 600; 310; 30; 20 INJECTION, SOLUTION INTRAVENOUS at 16:39

## 2023-06-26 RX ADMIN — LIDOCAINE HYDROCHLORIDE 100 MG: 20 INJECTION, SOLUTION EPIDURAL; INFILTRATION; INTRACAUDAL; PERINEURAL at 14:58

## 2023-06-26 ASSESSMENT — PAIN DESCRIPTION - LOCATION
LOCATION: ABDOMEN

## 2023-06-26 ASSESSMENT — PAIN DESCRIPTION - PAIN TYPE
TYPE: SURGICAL PAIN

## 2023-06-26 ASSESSMENT — PAIN DESCRIPTION - FREQUENCY
FREQUENCY: CONTINUOUS

## 2023-06-26 ASSESSMENT — PAIN DESCRIPTION - ONSET
ONSET: ON-GOING

## 2023-06-26 ASSESSMENT — PAIN DESCRIPTION - ORIENTATION
ORIENTATION: MID
ORIENTATION: MID;RIGHT
ORIENTATION: MID
ORIENTATION: MID

## 2023-06-26 ASSESSMENT — ENCOUNTER SYMPTOMS
EYE DISCHARGE: 0
SHORTNESS OF BREATH: 0
ABDOMINAL DISTENTION: 0
CHEST TIGHTNESS: 0
VOMITING: 0
ABDOMINAL PAIN: 0
NAUSEA: 0
COLOR CHANGE: 0
SORE THROAT: 0
DIARRHEA: 0
BLOOD IN STOOL: 0
EYE REDNESS: 0

## 2023-06-26 ASSESSMENT — PAIN DESCRIPTION - DESCRIPTORS
DESCRIPTORS: PRESSURE
DESCRIPTORS: ACHING

## 2023-06-26 ASSESSMENT — PAIN SCALES - GENERAL
PAINLEVEL_OUTOF10: 6
PAINLEVEL_OUTOF10: 5
PAINLEVEL_OUTOF10: 5
PAINLEVEL_OUTOF10: 8
PAINLEVEL_OUTOF10: 8
PAINLEVEL_OUTOF10: 3
PAINLEVEL_OUTOF10: 8
PAINLEVEL_OUTOF10: 6
PAINLEVEL_OUTOF10: 5
PAINLEVEL_OUTOF10: 5
PAINLEVEL_OUTOF10: 0

## 2023-06-26 ASSESSMENT — PAIN - FUNCTIONAL ASSESSMENT
PAIN_FUNCTIONAL_ASSESSMENT: ACTIVITIES ARE NOT PREVENTED

## 2023-06-26 ASSESSMENT — LIFESTYLE VARIABLES: SMOKING_STATUS: 1

## 2023-06-27 ENCOUNTER — APPOINTMENT (OUTPATIENT)
Dept: GENERAL RADIOLOGY | Age: 73
DRG: 386 | End: 2023-06-27
Payer: MEDICARE

## 2023-06-27 ENCOUNTER — APPOINTMENT (OUTPATIENT)
Dept: NUCLEAR MEDICINE | Age: 73
DRG: 386 | End: 2023-06-27
Payer: MEDICARE

## 2023-06-27 LAB
ALBUMIN SERPL-MCNC: 3 GM/DL (ref 3.4–5)
ALP BLD-CCNC: 352 IU/L (ref 40–128)
ALT SERPL-CCNC: 199 U/L (ref 10–40)
ANION GAP SERPL CALCULATED.3IONS-SCNC: 11 MMOL/L (ref 4–16)
AST SERPL-CCNC: 208 IU/L (ref 15–37)
BASOPHILS ABSOLUTE: 0 K/CU MM
BASOPHILS RELATIVE PERCENT: 0.1 % (ref 0–1)
BILIRUB SERPL-MCNC: 3.9 MG/DL (ref 0–1)
BUN SERPL-MCNC: 23 MG/DL (ref 6–23)
CALCIUM SERPL-MCNC: 7.5 MG/DL (ref 8.3–10.6)
CHLORIDE BLD-SCNC: 109 MMOL/L (ref 99–110)
CMV IGG SERPL IA-ACNC: <0.2 U/ML
CMV IGM SERPL IA-ACNC: <8 AU/ML
CO2: 22 MMOL/L (ref 21–32)
CREAT SERPL-MCNC: 1.2 MG/DL (ref 0.9–1.3)
DIFFERENTIAL TYPE: ABNORMAL
EBV EA-D IGG SER-ACNC: 42.4 U/ML (ref 0–10.9)
EBV NA IGG SER IA-ACNC: >600 U/ML (ref 0–21.9)
EBV VCA IGG SER IA-ACNC: 375 U/ML (ref 0–21.9)
EBV VCA IGM SER IA-ACNC: <10 U/ML (ref 0–43.9)
EOSINOPHILS ABSOLUTE: 0 K/CU MM
EOSINOPHILS RELATIVE PERCENT: 0 % (ref 0–3)
GFR SERPL CREATININE-BSD FRML MDRD: >60 ML/MIN/1.73M2
GLUCOSE BLD-MCNC: 113 MG/DL (ref 70–99)
GLUCOSE BLD-MCNC: 114 MG/DL (ref 70–99)
GLUCOSE BLD-MCNC: 128 MG/DL (ref 70–99)
GLUCOSE BLD-MCNC: 134 MG/DL (ref 70–99)
GLUCOSE BLD-MCNC: 82 MG/DL (ref 70–99)
GLUCOSE SERPL-MCNC: 138 MG/DL (ref 70–99)
HBV SURFACE AG SERPL QL IA: NON REACTIVE
HCT VFR BLD CALC: 33.9 % (ref 42–52)
HEMOGLOBIN: 10.9 GM/DL (ref 13.5–18)
IMMATURE NEUTROPHIL %: 0.6 % (ref 0–0.43)
LYMPHOCYTES ABSOLUTE: 0.2 K/CU MM
LYMPHOCYTES RELATIVE PERCENT: 2.6 % (ref 24–44)
MCH RBC QN AUTO: 31.8 PG (ref 27–31)
MCHC RBC AUTO-ENTMCNC: 32.2 % (ref 32–36)
MCV RBC AUTO: 98.8 FL (ref 78–100)
MONOCYTES ABSOLUTE: 0.4 K/CU MM
MONOCYTES RELATIVE PERCENT: 5.4 % (ref 0–4)
NUCLEATED RBC %: 0 %
PDW BLD-RTO: 12.7 % (ref 11.7–14.9)
PLATELET # BLD: 199 K/CU MM (ref 140–440)
PMV BLD AUTO: 12.4 FL (ref 7.5–11.1)
POTASSIUM SERPL-SCNC: 3.6 MMOL/L (ref 3.5–5.1)
RBC # BLD: 3.43 M/CU MM (ref 4.6–6.2)
SEGMENTED NEUTROPHILS ABSOLUTE COUNT: 6.6 K/CU MM
SEGMENTED NEUTROPHILS RELATIVE PERCENT: 91.3 % (ref 36–66)
SODIUM BLD-SCNC: 142 MMOL/L (ref 135–145)
TOTAL IMMATURE NEUTOROPHIL: 0.04 K/CU MM
TOTAL NUCLEATED RBC: 0 K/CU MM
TOTAL PROTEIN: 4.8 GM/DL (ref 6.4–8.2)
WBC # BLD: 7.2 K/CU MM (ref 4–10.5)

## 2023-06-27 PROCEDURE — 2500000003 HC RX 250 WO HCPCS: Performed by: STUDENT IN AN ORGANIZED HEALTH CARE EDUCATION/TRAINING PROGRAM

## 2023-06-27 PROCEDURE — 36415 COLL VENOUS BLD VENIPUNCTURE: CPT

## 2023-06-27 PROCEDURE — 3430000000 HC RX DIAGNOSTIC RADIOPHARMACEUTICAL: Performed by: SPECIALIST

## 2023-06-27 PROCEDURE — 2580000003 HC RX 258: Performed by: NURSE PRACTITIONER

## 2023-06-27 PROCEDURE — 82962 GLUCOSE BLOOD TEST: CPT

## 2023-06-27 PROCEDURE — 94761 N-INVAS EAR/PLS OXIMETRY MLT: CPT

## 2023-06-27 PROCEDURE — 74022 RADEX COMPL AQT ABD SERIES: CPT

## 2023-06-27 PROCEDURE — 6370000000 HC RX 637 (ALT 250 FOR IP): Performed by: PHYSICIAN ASSISTANT

## 2023-06-27 PROCEDURE — 87340 HEPATITIS B SURFACE AG IA: CPT

## 2023-06-27 PROCEDURE — 78226 HEPATOBILIARY SYSTEM IMAGING: CPT

## 2023-06-27 PROCEDURE — 6360000002 HC RX W HCPCS: Performed by: STUDENT IN AN ORGANIZED HEALTH CARE EDUCATION/TRAINING PROGRAM

## 2023-06-27 PROCEDURE — A9537 TC99M MEBROFENIN: HCPCS | Performed by: SPECIALIST

## 2023-06-27 PROCEDURE — 6360000002 HC RX W HCPCS: Performed by: INTERNAL MEDICINE

## 2023-06-27 PROCEDURE — 85025 COMPLETE CBC W/AUTO DIFF WBC: CPT

## 2023-06-27 PROCEDURE — 2580000003 HC RX 258: Performed by: STUDENT IN AN ORGANIZED HEALTH CARE EDUCATION/TRAINING PROGRAM

## 2023-06-27 PROCEDURE — 99232 SBSQ HOSP IP/OBS MODERATE 35: CPT | Performed by: SURGERY

## 2023-06-27 PROCEDURE — 80053 COMPREHEN METABOLIC PANEL: CPT

## 2023-06-27 PROCEDURE — 1200000000 HC SEMI PRIVATE

## 2023-06-27 PROCEDURE — C9113 INJ PANTOPRAZOLE SODIUM, VIA: HCPCS | Performed by: NURSE PRACTITIONER

## 2023-06-27 PROCEDURE — 6360000002 HC RX W HCPCS: Performed by: NURSE PRACTITIONER

## 2023-06-27 RX ADMIN — Medication 6 MILLICURIE: at 16:33

## 2023-06-27 RX ADMIN — PANTOPRAZOLE SODIUM 40 MG: 40 INJECTION, POWDER, FOR SOLUTION INTRAVENOUS at 10:20

## 2023-06-27 RX ADMIN — METHYLPREDNISOLONE SODIUM SUCCINATE 40 MG: 40 INJECTION, POWDER, FOR SOLUTION INTRAMUSCULAR; INTRAVENOUS at 18:59

## 2023-06-27 RX ADMIN — DEXTROSE AND SODIUM CHLORIDE: 5; 900 INJECTION, SOLUTION INTRAVENOUS at 12:41

## 2023-06-27 RX ADMIN — PIPERACILLIN AND TAZOBACTAM 3375 MG: 3; .375 INJECTION, POWDER, LYOPHILIZED, FOR SOLUTION INTRAVENOUS at 21:38

## 2023-06-27 RX ADMIN — DEXTROSE AND SODIUM CHLORIDE: 5; 900 INJECTION, SOLUTION INTRAVENOUS at 03:24

## 2023-06-27 RX ADMIN — PIPERACILLIN AND TAZOBACTAM 3375 MG: 3; .375 INJECTION, POWDER, LYOPHILIZED, FOR SOLUTION INTRAVENOUS at 03:34

## 2023-06-27 RX ADMIN — PIPERACILLIN AND TAZOBACTAM 3375 MG: 3; .375 INJECTION, POWDER, LYOPHILIZED, FOR SOLUTION INTRAVENOUS at 11:53

## 2023-06-27 RX ADMIN — LABETALOL HYDROCHLORIDE 10 MG: 5 INJECTION INTRAVENOUS at 21:28

## 2023-06-27 RX ADMIN — METHYLPREDNISOLONE SODIUM SUCCINATE 40 MG: 40 INJECTION, POWDER, FOR SOLUTION INTRAMUSCULAR; INTRAVENOUS at 04:30

## 2023-06-27 RX ADMIN — VANCOMYCIN HYDROCHLORIDE 1250 MG: 1.25 INJECTION, POWDER, LYOPHILIZED, FOR SOLUTION INTRAVENOUS at 19:42

## 2023-06-27 RX ADMIN — BUSPIRONE HYDROCHLORIDE 5 MG: 5 TABLET ORAL at 21:27

## 2023-06-27 ASSESSMENT — PAIN SCALES - GENERAL
PAINLEVEL_OUTOF10: 0
PAINLEVEL_OUTOF10: 4

## 2023-06-27 ASSESSMENT — PAIN DESCRIPTION - LOCATION: LOCATION: ABDOMEN

## 2023-06-28 PROBLEM — R00.1 BRADYCARDIA: Status: ACTIVE | Noted: 2023-06-28

## 2023-06-28 LAB
ALBUMIN SERPL-MCNC: 3.1 GM/DL (ref 3.4–5)
ALBUMIN SERPL-MCNC: 3.2 GM/DL (ref 3.4–5)
ALP BLD-CCNC: 374 IU/L (ref 40–129)
ALT SERPL-CCNC: 149 U/L (ref 10–40)
ANION GAP SERPL CALCULATED.3IONS-SCNC: 12 MMOL/L (ref 4–16)
ANION GAP SERPL CALCULATED.3IONS-SCNC: 12 MMOL/L (ref 4–16)
AST SERPL-CCNC: 73 IU/L (ref 15–37)
BILIRUB SERPL-MCNC: 1.8 MG/DL (ref 0–1)
BILIRUBIN DIRECT: 1.3 MG/DL (ref 0–0.3)
BILIRUBIN, INDIRECT: 0.5 MG/DL (ref 0–0.7)
BUN SERPL-MCNC: 18 MG/DL (ref 6–23)
BUN SERPL-MCNC: 20 MG/DL (ref 6–23)
CALCIUM SERPL-MCNC: 7.5 MG/DL (ref 8.3–10.6)
CALCIUM SERPL-MCNC: 7.7 MG/DL (ref 8.3–10.6)
CHLORIDE BLD-SCNC: 107 MMOL/L (ref 99–110)
CHLORIDE BLD-SCNC: 108 MMOL/L (ref 99–110)
CO2: 21 MMOL/L (ref 21–32)
CO2: 23 MMOL/L (ref 21–32)
CREAT SERPL-MCNC: 1.2 MG/DL (ref 0.9–1.3)
CREAT SERPL-MCNC: 1.4 MG/DL (ref 0.9–1.3)
DOSE AMOUNT: NORMAL
DOSE TIME: NORMAL
EKG ATRIAL RATE: 111 BPM
EKG ATRIAL RATE: 56 BPM
EKG DIAGNOSIS: NORMAL
EKG DIAGNOSIS: NORMAL
EKG P AXIS: 59 DEGREES
EKG P AXIS: 60 DEGREES
EKG P-R INTERVAL: 160 MS
EKG P-R INTERVAL: 168 MS
EKG Q-T INTERVAL: 370 MS
EKG Q-T INTERVAL: 446 MS
EKG QRS DURATION: 150 MS
EKG QRS DURATION: 90 MS
EKG QTC CALCULATION (BAZETT): 430 MS
EKG QTC CALCULATION (BAZETT): 503 MS
EKG R AXIS: -25 DEGREES
EKG R AXIS: -80 DEGREES
EKG T AXIS: -25 DEGREES
EKG T AXIS: 27 DEGREES
EKG VENTRICULAR RATE: 111 BPM
EKG VENTRICULAR RATE: 56 BPM
GFR SERPL CREATININE-BSD FRML MDRD: 53 ML/MIN/1.73M2
GFR SERPL CREATININE-BSD FRML MDRD: >60 ML/MIN/1.73M2
GLUCOSE BLD-MCNC: 131 MG/DL (ref 70–99)
GLUCOSE BLD-MCNC: 143 MG/DL (ref 70–99)
GLUCOSE BLD-MCNC: 99 MG/DL (ref 70–99)
GLUCOSE SERPL-MCNC: 156 MG/DL (ref 70–99)
GLUCOSE SERPL-MCNC: 164 MG/DL (ref 70–99)
MAGNESIUM: 1.9 MG/DL (ref 1.8–2.4)
PHOSPHORUS: 2.6 MG/DL (ref 2.5–4.9)
POTASSIUM SERPL-SCNC: 3.4 MMOL/L (ref 3.5–5.1)
POTASSIUM SERPL-SCNC: 3.7 MMOL/L (ref 3.5–5.1)
SODIUM BLD-SCNC: 140 MMOL/L (ref 135–145)
SODIUM BLD-SCNC: 143 MMOL/L (ref 135–145)
TOTAL PROTEIN: 5.1 GM/DL (ref 6.4–8.2)
TSH SERPL DL<=0.005 MIU/L-ACNC: 0.88 UIU/ML (ref 0.27–4.2)
VANCOMYCIN RANDOM: 14.3 UG/ML

## 2023-06-28 PROCEDURE — 83735 ASSAY OF MAGNESIUM: CPT

## 2023-06-28 PROCEDURE — 80069 RENAL FUNCTION PANEL: CPT

## 2023-06-28 PROCEDURE — 84443 ASSAY THYROID STIM HORMONE: CPT

## 2023-06-28 PROCEDURE — 99232 SBSQ HOSP IP/OBS MODERATE 35: CPT | Performed by: SURGERY

## 2023-06-28 PROCEDURE — 93010 ELECTROCARDIOGRAM REPORT: CPT | Performed by: INTERNAL MEDICINE

## 2023-06-28 PROCEDURE — 80053 COMPREHEN METABOLIC PANEL: CPT

## 2023-06-28 PROCEDURE — 6370000000 HC RX 637 (ALT 250 FOR IP): Performed by: INTERNAL MEDICINE

## 2023-06-28 PROCEDURE — 99223 1ST HOSP IP/OBS HIGH 75: CPT | Performed by: INTERNAL MEDICINE

## 2023-06-28 PROCEDURE — 2580000003 HC RX 258: Performed by: NURSE PRACTITIONER

## 2023-06-28 PROCEDURE — 36415 COLL VENOUS BLD VENIPUNCTURE: CPT

## 2023-06-28 PROCEDURE — 2580000003 HC RX 258: Performed by: STUDENT IN AN ORGANIZED HEALTH CARE EDUCATION/TRAINING PROGRAM

## 2023-06-28 PROCEDURE — 80202 ASSAY OF VANCOMYCIN: CPT

## 2023-06-28 PROCEDURE — 82962 GLUCOSE BLOOD TEST: CPT

## 2023-06-28 PROCEDURE — 82248 BILIRUBIN DIRECT: CPT

## 2023-06-28 PROCEDURE — 6360000002 HC RX W HCPCS: Performed by: INTERNAL MEDICINE

## 2023-06-28 PROCEDURE — 1200000000 HC SEMI PRIVATE

## 2023-06-28 PROCEDURE — 93005 ELECTROCARDIOGRAM TRACING: CPT | Performed by: STUDENT IN AN ORGANIZED HEALTH CARE EDUCATION/TRAINING PROGRAM

## 2023-06-28 PROCEDURE — 6360000002 HC RX W HCPCS: Performed by: NURSE PRACTITIONER

## 2023-06-28 PROCEDURE — 6370000000 HC RX 637 (ALT 250 FOR IP): Performed by: PHYSICIAN ASSISTANT

## 2023-06-28 PROCEDURE — 6360000002 HC RX W HCPCS: Performed by: STUDENT IN AN ORGANIZED HEALTH CARE EDUCATION/TRAINING PROGRAM

## 2023-06-28 PROCEDURE — 94761 N-INVAS EAR/PLS OXIMETRY MLT: CPT

## 2023-06-28 PROCEDURE — C9113 INJ PANTOPRAZOLE SODIUM, VIA: HCPCS | Performed by: NURSE PRACTITIONER

## 2023-06-28 RX ORDER — PREDNISONE 20 MG/1
20 TABLET ORAL DAILY
Status: DISCONTINUED | OUTPATIENT
Start: 2023-06-28 | End: 2023-06-30 | Stop reason: HOSPADM

## 2023-06-28 RX ORDER — AMLODIPINE BESYLATE 10 MG/1
10 TABLET ORAL DAILY
Status: DISCONTINUED | OUTPATIENT
Start: 2023-06-29 | End: 2023-06-30 | Stop reason: HOSPADM

## 2023-06-28 RX ORDER — HYDRALAZINE HYDROCHLORIDE 20 MG/ML
10 INJECTION INTRAMUSCULAR; INTRAVENOUS EVERY 6 HOURS PRN
Status: DISCONTINUED | OUTPATIENT
Start: 2023-06-28 | End: 2023-06-30 | Stop reason: HOSPADM

## 2023-06-28 RX ADMIN — VANCOMYCIN HYDROCHLORIDE 1250 MG: 1.25 INJECTION, POWDER, LYOPHILIZED, FOR SOLUTION INTRAVENOUS at 14:13

## 2023-06-28 RX ADMIN — PIPERACILLIN AND TAZOBACTAM 3375 MG: 3; .375 INJECTION, POWDER, LYOPHILIZED, FOR SOLUTION INTRAVENOUS at 12:58

## 2023-06-28 RX ADMIN — METHYLPREDNISOLONE SODIUM SUCCINATE 40 MG: 40 INJECTION, POWDER, FOR SOLUTION INTRAMUSCULAR; INTRAVENOUS at 03:50

## 2023-06-28 RX ADMIN — PIPERACILLIN AND TAZOBACTAM 3375 MG: 3; .375 INJECTION, POWDER, LYOPHILIZED, FOR SOLUTION INTRAVENOUS at 03:44

## 2023-06-28 RX ADMIN — DEXTROSE AND SODIUM CHLORIDE: 5; 900 INJECTION, SOLUTION INTRAVENOUS at 08:27

## 2023-06-28 RX ADMIN — PIPERACILLIN AND TAZOBACTAM 3375 MG: 3; .375 INJECTION, POWDER, LYOPHILIZED, FOR SOLUTION INTRAVENOUS at 20:55

## 2023-06-28 RX ADMIN — DEXTROSE AND SODIUM CHLORIDE: 5; 900 INJECTION, SOLUTION INTRAVENOUS at 00:13

## 2023-06-28 RX ADMIN — BUSPIRONE HYDROCHLORIDE 5 MG: 5 TABLET ORAL at 20:55

## 2023-06-28 RX ADMIN — SERTRALINE HYDROCHLORIDE 150 MG: 50 TABLET ORAL at 08:16

## 2023-06-28 RX ADMIN — AMLODIPINE BESYLATE 5 MG: 5 TABLET ORAL at 08:17

## 2023-06-28 RX ADMIN — PREDNISONE 20 MG: 20 TABLET ORAL at 08:25

## 2023-06-28 RX ADMIN — PANTOPRAZOLE SODIUM 40 MG: 40 INJECTION, POWDER, FOR SOLUTION INTRAVENOUS at 08:16

## 2023-06-28 RX ADMIN — DEXTROSE AND SODIUM CHLORIDE: 5; 900 INJECTION, SOLUTION INTRAVENOUS at 20:47

## 2023-06-29 ENCOUNTER — APPOINTMENT (OUTPATIENT)
Dept: GENERAL RADIOLOGY | Age: 73
DRG: 386 | End: 2023-06-29
Payer: MEDICARE

## 2023-06-29 LAB
ALBUMIN SERPL-MCNC: 2.8 GM/DL (ref 3.4–5)
ALBUMIN SERPL-MCNC: 2.8 GM/DL (ref 3.4–5)
ALP BLD-CCNC: 286 IU/L (ref 40–129)
ALT SERPL-CCNC: 96 U/L (ref 10–40)
ANION GAP SERPL CALCULATED.3IONS-SCNC: 13 MMOL/L (ref 4–16)
ANION GAP SERPL CALCULATED.3IONS-SCNC: 15 MMOL/L (ref 4–16)
AST SERPL-CCNC: 30 IU/L (ref 15–37)
BILIRUB SERPL-MCNC: 1.3 MG/DL (ref 0–1)
BILIRUBIN DIRECT: 0.8 MG/DL (ref 0–0.3)
BILIRUBIN, INDIRECT: 0.5 MG/DL (ref 0–0.7)
BUN SERPL-MCNC: 12 MG/DL (ref 6–23)
BUN SERPL-MCNC: 14 MG/DL (ref 6–23)
CALCIUM SERPL-MCNC: 7.3 MG/DL (ref 8.3–10.6)
CALCIUM SERPL-MCNC: 7.4 MG/DL (ref 8.3–10.6)
CHLORIDE BLD-SCNC: 100 MMOL/L (ref 99–110)
CHLORIDE BLD-SCNC: 106 MMOL/L (ref 99–110)
CO2: 21 MMOL/L (ref 21–32)
CO2: 22 MMOL/L (ref 21–32)
CREAT SERPL-MCNC: 1.2 MG/DL (ref 0.9–1.3)
CREAT SERPL-MCNC: 1.2 MG/DL (ref 0.9–1.3)
CRP SERPL HS-MCNC: 29.3 MG/L
GFR SERPL CREATININE-BSD FRML MDRD: >60 ML/MIN/1.73M2
GFR SERPL CREATININE-BSD FRML MDRD: >60 ML/MIN/1.73M2
GLUCOSE SERPL-MCNC: 107 MG/DL (ref 70–99)
GLUCOSE SERPL-MCNC: 129 MG/DL (ref 70–99)
HCT VFR BLD CALC: 33.5 % (ref 42–52)
HEMOGLOBIN: 11.2 GM/DL (ref 13.5–18)
MCH RBC QN AUTO: 32.1 PG (ref 27–31)
MCHC RBC AUTO-ENTMCNC: 33.4 % (ref 32–36)
MCV RBC AUTO: 96 FL (ref 78–100)
PDW BLD-RTO: 13 % (ref 11.7–14.9)
PHOSPHORUS: 1.7 MG/DL (ref 2.5–4.9)
PLATELET # BLD: 112 K/CU MM (ref 140–440)
PMV BLD AUTO: 12.2 FL (ref 7.5–11.1)
POTASSIUM SERPL-SCNC: 2.9 MMOL/L (ref 3.5–5.1)
POTASSIUM SERPL-SCNC: 3.2 MMOL/L (ref 3.5–5.1)
RBC # BLD: 3.49 M/CU MM (ref 4.6–6.2)
SODIUM BLD-SCNC: 136 MMOL/L (ref 135–145)
SODIUM BLD-SCNC: 141 MMOL/L (ref 135–145)
TOTAL PROTEIN: 4.9 GM/DL (ref 6.4–8.2)
WBC # BLD: 8.1 K/CU MM (ref 4–10.5)

## 2023-06-29 PROCEDURE — 6370000000 HC RX 637 (ALT 250 FOR IP): Performed by: PHYSICIAN ASSISTANT

## 2023-06-29 PROCEDURE — 97530 THERAPEUTIC ACTIVITIES: CPT

## 2023-06-29 PROCEDURE — 97165 OT EVAL LOW COMPLEX 30 MIN: CPT

## 2023-06-29 PROCEDURE — C9113 INJ PANTOPRAZOLE SODIUM, VIA: HCPCS | Performed by: NURSE PRACTITIONER

## 2023-06-29 PROCEDURE — 36415 COLL VENOUS BLD VENIPUNCTURE: CPT

## 2023-06-29 PROCEDURE — 6370000000 HC RX 637 (ALT 250 FOR IP): Performed by: INTERNAL MEDICINE

## 2023-06-29 PROCEDURE — 97116 GAIT TRAINING THERAPY: CPT

## 2023-06-29 PROCEDURE — 1200000000 HC SEMI PRIVATE

## 2023-06-29 PROCEDURE — 2580000003 HC RX 258: Performed by: STUDENT IN AN ORGANIZED HEALTH CARE EDUCATION/TRAINING PROGRAM

## 2023-06-29 PROCEDURE — 80053 COMPREHEN METABOLIC PANEL: CPT

## 2023-06-29 PROCEDURE — 6370000000 HC RX 637 (ALT 250 FOR IP): Performed by: NURSE PRACTITIONER

## 2023-06-29 PROCEDURE — 80048 BASIC METABOLIC PNL TOTAL CA: CPT

## 2023-06-29 PROCEDURE — 97535 SELF CARE MNGMENT TRAINING: CPT

## 2023-06-29 PROCEDURE — 6370000000 HC RX 637 (ALT 250 FOR IP): Performed by: STUDENT IN AN ORGANIZED HEALTH CARE EDUCATION/TRAINING PROGRAM

## 2023-06-29 PROCEDURE — 97162 PT EVAL MOD COMPLEX 30 MIN: CPT

## 2023-06-29 PROCEDURE — 6360000002 HC RX W HCPCS: Performed by: NURSE PRACTITIONER

## 2023-06-29 PROCEDURE — 82248 BILIRUBIN DIRECT: CPT

## 2023-06-29 PROCEDURE — 86140 C-REACTIVE PROTEIN: CPT

## 2023-06-29 PROCEDURE — 84100 ASSAY OF PHOSPHORUS: CPT

## 2023-06-29 PROCEDURE — 71046 X-RAY EXAM CHEST 2 VIEWS: CPT

## 2023-06-29 PROCEDURE — 6360000002 HC RX W HCPCS: Performed by: STUDENT IN AN ORGANIZED HEALTH CARE EDUCATION/TRAINING PROGRAM

## 2023-06-29 PROCEDURE — 6360000002 HC RX W HCPCS: Performed by: FAMILY MEDICINE

## 2023-06-29 PROCEDURE — 85027 COMPLETE CBC AUTOMATED: CPT

## 2023-06-29 PROCEDURE — 2580000003 HC RX 258: Performed by: NURSE PRACTITIONER

## 2023-06-29 PROCEDURE — 6370000000 HC RX 637 (ALT 250 FOR IP): Performed by: FAMILY MEDICINE

## 2023-06-29 RX ORDER — POTASSIUM CHLORIDE 20 MEQ/1
40 TABLET, EXTENDED RELEASE ORAL PRN
Status: DISCONTINUED | OUTPATIENT
Start: 2023-06-29 | End: 2023-06-30 | Stop reason: HOSPADM

## 2023-06-29 RX ORDER — POTASSIUM CHLORIDE 20 MEQ/1
40 TABLET, EXTENDED RELEASE ORAL ONCE
Status: COMPLETED | OUTPATIENT
Start: 2023-06-29 | End: 2023-06-29

## 2023-06-29 RX ORDER — CALCIUM CARBONATE 500(1250)
500 TABLET ORAL DAILY
Status: DISCONTINUED | OUTPATIENT
Start: 2023-06-29 | End: 2023-06-30 | Stop reason: HOSPADM

## 2023-06-29 RX ORDER — POTASSIUM CHLORIDE 7.45 MG/ML
10 INJECTION INTRAVENOUS PRN
Status: DISCONTINUED | OUTPATIENT
Start: 2023-06-29 | End: 2023-06-30 | Stop reason: HOSPADM

## 2023-06-29 RX ORDER — LANOLIN ALCOHOL/MO/W.PET/CERES
6 CREAM (GRAM) TOPICAL NIGHTLY PRN
Status: DISCONTINUED | OUTPATIENT
Start: 2023-06-29 | End: 2023-06-30 | Stop reason: HOSPADM

## 2023-06-29 RX ADMIN — POTASSIUM CHLORIDE 10 MEQ: 7.46 INJECTION, SOLUTION INTRAVENOUS at 07:05

## 2023-06-29 RX ADMIN — DEXTROSE AND SODIUM CHLORIDE: 5; 900 INJECTION, SOLUTION INTRAVENOUS at 05:02

## 2023-06-29 RX ADMIN — PREDNISONE 20 MG: 20 TABLET ORAL at 08:26

## 2023-06-29 RX ADMIN — POTASSIUM CHLORIDE 10 MEQ: 7.46 INJECTION, SOLUTION INTRAVENOUS at 13:01

## 2023-06-29 RX ADMIN — POTASSIUM CHLORIDE 10 MEQ: 7.46 INJECTION, SOLUTION INTRAVENOUS at 10:27

## 2023-06-29 RX ADMIN — CALCIUM 500 MG: 500 TABLET ORAL at 05:01

## 2023-06-29 RX ADMIN — PIPERACILLIN AND TAZOBACTAM 3375 MG: 3; .375 INJECTION, POWDER, LYOPHILIZED, FOR SOLUTION INTRAVENOUS at 05:04

## 2023-06-29 RX ADMIN — AMLODIPINE BESYLATE 10 MG: 10 TABLET ORAL at 08:26

## 2023-06-29 RX ADMIN — PANTOPRAZOLE SODIUM 40 MG: 40 INJECTION, POWDER, FOR SOLUTION INTRAVENOUS at 08:26

## 2023-06-29 RX ADMIN — SERTRALINE HYDROCHLORIDE 150 MG: 50 TABLET ORAL at 08:25

## 2023-06-29 RX ADMIN — Medication 6 MG: at 21:11

## 2023-06-29 RX ADMIN — POTASSIUM CHLORIDE 10 MEQ: 7.46 INJECTION, SOLUTION INTRAVENOUS at 11:46

## 2023-06-29 RX ADMIN — BUSPIRONE HYDROCHLORIDE 5 MG: 5 TABLET ORAL at 21:06

## 2023-06-29 RX ADMIN — POTASSIUM CHLORIDE 40 MEQ: 1500 TABLET, EXTENDED RELEASE ORAL at 16:37

## 2023-06-29 RX ADMIN — PIPERACILLIN AND TAZOBACTAM 3375 MG: 3; .375 INJECTION, POWDER, LYOPHILIZED, FOR SOLUTION INTRAVENOUS at 21:07

## 2023-06-29 RX ADMIN — PIPERACILLIN AND TAZOBACTAM 3375 MG: 3; .375 INJECTION, POWDER, LYOPHILIZED, FOR SOLUTION INTRAVENOUS at 13:02

## 2023-06-29 RX ADMIN — POTASSIUM CHLORIDE 10 MEQ: 7.46 INJECTION, SOLUTION INTRAVENOUS at 09:01

## 2023-06-29 RX ADMIN — POTASSIUM CHLORIDE 10 MEQ: 7.46 INJECTION, SOLUTION INTRAVENOUS at 05:50

## 2023-06-30 VITALS
TEMPERATURE: 98.3 F | DIASTOLIC BLOOD PRESSURE: 90 MMHG | RESPIRATION RATE: 18 BRPM | OXYGEN SATURATION: 97 % | BODY MASS INDEX: 30.06 KG/M2 | HEIGHT: 67 IN | HEART RATE: 81 BPM | WEIGHT: 191.5 LBS | SYSTOLIC BLOOD PRESSURE: 163 MMHG

## 2023-06-30 LAB
ALBUMIN SERPL-MCNC: 3 GM/DL (ref 3.4–5)
ALBUMIN SERPL-MCNC: 3 GM/DL (ref 3.4–5)
ALP BLD-CCNC: 241 IU/L (ref 40–129)
ALT SERPL-CCNC: 68 U/L (ref 10–40)
ANION GAP SERPL CALCULATED.3IONS-SCNC: 11 MMOL/L (ref 4–16)
AST SERPL-CCNC: 21 IU/L (ref 15–37)
BILIRUB SERPL-MCNC: 1.3 MG/DL (ref 0–1)
BILIRUBIN DIRECT: 0.7 MG/DL (ref 0–0.3)
BILIRUBIN, INDIRECT: 0.6 MG/DL (ref 0–0.7)
BUN SERPL-MCNC: 13 MG/DL (ref 6–23)
CALCIUM SERPL-MCNC: 7.6 MG/DL (ref 8.3–10.6)
CHLORIDE BLD-SCNC: 103 MMOL/L (ref 99–110)
CO2: 26 MMOL/L (ref 21–32)
CREAT SERPL-MCNC: 1.3 MG/DL (ref 0.9–1.3)
GFR SERPL CREATININE-BSD FRML MDRD: 58 ML/MIN/1.73M2
GLUCOSE BLD-MCNC: 121 MG/DL (ref 70–99)
GLUCOSE BLD-MCNC: 92 MG/DL (ref 70–99)
GLUCOSE SERPL-MCNC: 94 MG/DL (ref 70–99)
PHOSPHORUS: 2.6 MG/DL (ref 2.5–4.9)
POTASSIUM SERPL-SCNC: 3.5 MMOL/L (ref 3.5–5.1)
SODIUM BLD-SCNC: 140 MMOL/L (ref 135–145)
TOTAL PROTEIN: 4.9 GM/DL (ref 6.4–8.2)

## 2023-06-30 PROCEDURE — 6360000002 HC RX W HCPCS: Performed by: INTERNAL MEDICINE

## 2023-06-30 PROCEDURE — C9113 INJ PANTOPRAZOLE SODIUM, VIA: HCPCS | Performed by: NURSE PRACTITIONER

## 2023-06-30 PROCEDURE — 36415 COLL VENOUS BLD VENIPUNCTURE: CPT

## 2023-06-30 PROCEDURE — 6360000002 HC RX W HCPCS: Performed by: STUDENT IN AN ORGANIZED HEALTH CARE EDUCATION/TRAINING PROGRAM

## 2023-06-30 PROCEDURE — 6360000002 HC RX W HCPCS: Performed by: NURSE PRACTITIONER

## 2023-06-30 PROCEDURE — 97530 THERAPEUTIC ACTIVITIES: CPT

## 2023-06-30 PROCEDURE — 6370000000 HC RX 637 (ALT 250 FOR IP): Performed by: PHYSICIAN ASSISTANT

## 2023-06-30 PROCEDURE — 94761 N-INVAS EAR/PLS OXIMETRY MLT: CPT

## 2023-06-30 PROCEDURE — 84100 ASSAY OF PHOSPHORUS: CPT

## 2023-06-30 PROCEDURE — 2580000003 HC RX 258: Performed by: STUDENT IN AN ORGANIZED HEALTH CARE EDUCATION/TRAINING PROGRAM

## 2023-06-30 PROCEDURE — 6370000000 HC RX 637 (ALT 250 FOR IP): Performed by: INTERNAL MEDICINE

## 2023-06-30 PROCEDURE — 97116 GAIT TRAINING THERAPY: CPT

## 2023-06-30 PROCEDURE — 82962 GLUCOSE BLOOD TEST: CPT

## 2023-06-30 PROCEDURE — 80053 COMPREHEN METABOLIC PANEL: CPT

## 2023-06-30 PROCEDURE — 2580000003 HC RX 258

## 2023-06-30 PROCEDURE — 82248 BILIRUBIN DIRECT: CPT

## 2023-06-30 PROCEDURE — 6370000000 HC RX 637 (ALT 250 FOR IP): Performed by: FAMILY MEDICINE

## 2023-06-30 RX ORDER — PREDNISONE 20 MG/1
20 TABLET ORAL DAILY
Qty: 30 TABLET | Refills: 0 | Status: SHIPPED | OUTPATIENT
Start: 2023-07-01 | End: 2023-07-31

## 2023-06-30 RX ORDER — CALCIUM CARBONATE 500(1250)
500 TABLET ORAL DAILY
Qty: 30 TABLET | Refills: 0 | Status: SHIPPED | OUTPATIENT
Start: 2023-07-01

## 2023-06-30 RX ORDER — FUROSEMIDE 20 MG/1
20 TABLET ORAL DAILY
Qty: 3 TABLET | Refills: 0 | Status: SHIPPED | OUTPATIENT
Start: 2023-06-30 | End: 2023-07-03

## 2023-06-30 RX ORDER — AMOXICILLIN AND CLAVULANATE POTASSIUM 875; 125 MG/1; MG/1
1 TABLET, FILM COATED ORAL 2 TIMES DAILY
Qty: 10 TABLET | Refills: 0 | Status: SHIPPED | OUTPATIENT
Start: 2023-06-30 | End: 2023-07-05

## 2023-06-30 RX ORDER — CARVEDILOL 3.12 MG/1
3.12 TABLET ORAL 2 TIMES DAILY
Qty: 60 TABLET | Refills: 0 | Status: SHIPPED
Start: 2023-06-30 | End: 2023-06-30 | Stop reason: HOSPADM

## 2023-06-30 RX ORDER — SERTRALINE HYDROCHLORIDE 100 MG/1
150 TABLET, FILM COATED ORAL DAILY
Qty: 30 TABLET | Refills: 0 | Status: SHIPPED | OUTPATIENT
Start: 2023-06-30

## 2023-06-30 RX ORDER — AMLODIPINE BESYLATE 10 MG/1
10 TABLET ORAL DAILY
Qty: 30 TABLET | Refills: 0 | Status: SHIPPED | OUTPATIENT
Start: 2023-07-01

## 2023-06-30 RX ORDER — SODIUM CHLORIDE 9 MG/ML
INJECTION INTRAVENOUS
Status: COMPLETED
Start: 2023-06-30 | End: 2023-06-30

## 2023-06-30 RX ORDER — POTASSIUM CHLORIDE 20 MEQ/1
20 TABLET, EXTENDED RELEASE ORAL 2 TIMES DAILY
Qty: 6 TABLET | Refills: 0 | Status: SHIPPED | OUTPATIENT
Start: 2023-06-30 | End: 2023-07-03

## 2023-06-30 RX ADMIN — HYDRALAZINE HYDROCHLORIDE 10 MG: 20 INJECTION INTRAMUSCULAR; INTRAVENOUS at 04:27

## 2023-06-30 RX ADMIN — PANTOPRAZOLE SODIUM 40 MG: 40 INJECTION, POWDER, FOR SOLUTION INTRAVENOUS at 09:54

## 2023-06-30 RX ADMIN — PREDNISONE 20 MG: 20 TABLET ORAL at 09:53

## 2023-06-30 RX ADMIN — PIPERACILLIN AND TAZOBACTAM 3375 MG: 3; .375 INJECTION, POWDER, LYOPHILIZED, FOR SOLUTION INTRAVENOUS at 04:24

## 2023-06-30 RX ADMIN — CALCIUM 500 MG: 500 TABLET ORAL at 09:53

## 2023-06-30 RX ADMIN — AMLODIPINE BESYLATE 10 MG: 10 TABLET ORAL at 09:54

## 2023-06-30 RX ADMIN — SODIUM CHLORIDE, PRESERVATIVE FREE 10 ML: 5 INJECTION INTRAVENOUS at 09:54

## 2023-06-30 RX ADMIN — SERTRALINE HYDROCHLORIDE 150 MG: 50 TABLET ORAL at 09:54

## 2023-07-01 LAB
CULTURE: NORMAL
Lab: NORMAL
SPECIMEN: NORMAL

## 2023-07-03 ENCOUNTER — ANESTHESIA EVENT (OUTPATIENT)
Dept: ENDOSCOPY | Age: 73
End: 2023-07-03
Payer: MEDICARE

## 2023-07-03 NOTE — PROGRESS NOTES
Patient notified of procedure time at [facility] 7/5/23 1145 arrival 21  understanding verbalized

## 2023-07-03 NOTE — ANESTHESIA PRE PROCEDURE
prep,           Endo/Other:                     Abdominal:             Vascular: Other Findings:           Anesthesia Plan      MAC     ASA 3       Induction: intravenous. Anesthetic plan and risks discussed with patient. Plan discussed with CRNA. Attending anesthesiologist reviewed and agrees with Preprocedure content                GUMARO Callahan - CRNA   7/3/2023       Pre Anesthesia Assessment complete.  Chart reviewed on 7/3/2023

## 2023-07-03 NOTE — PROGRESS NOTES
Attempted to contact patient for PAT assessment and procedure instructions, no answer left VM with procedure instructions

## 2023-07-05 ENCOUNTER — HOSPITAL ENCOUNTER (OUTPATIENT)
Age: 73
Setting detail: OUTPATIENT SURGERY
Discharge: HOME | End: 2023-07-05
Attending: SPECIALIST | Admitting: SPECIALIST
Payer: MEDICARE

## 2023-07-05 ENCOUNTER — ANESTHESIA (OUTPATIENT)
Dept: ENDOSCOPY | Age: 73
End: 2023-07-05
Payer: MEDICARE

## 2023-07-05 VITALS
DIASTOLIC BLOOD PRESSURE: 66 MMHG | TEMPERATURE: 97.5 F | RESPIRATION RATE: 16 BRPM | SYSTOLIC BLOOD PRESSURE: 140 MMHG | HEART RATE: 67 BPM | OXYGEN SATURATION: 99 %

## 2023-07-05 LAB
ALBUMIN SERPL-MCNC: 3.9 GM/DL (ref 3.4–5)
ALP BLD-CCNC: 166 IU/L (ref 40–129)
ALT SERPL-CCNC: 42 U/L (ref 10–40)
ANION GAP SERPL CALCULATED.3IONS-SCNC: 15 MMOL/L (ref 4–16)
AST SERPL-CCNC: 30 IU/L (ref 15–37)
BILIRUB SERPL-MCNC: 1.5 MG/DL (ref 0–1)
BUN SERPL-MCNC: 34 MG/DL (ref 6–23)
CALCIUM SERPL-MCNC: 8.9 MG/DL (ref 8.3–10.6)
CHLORIDE BLD-SCNC: 100 MMOL/L (ref 99–110)
CO2: 18 MMOL/L (ref 21–32)
CREAT SERPL-MCNC: 2.5 MG/DL (ref 0.9–1.3)
GFR SERPL CREATININE-BSD FRML MDRD: 26 ML/MIN/1.73M2
GLUCOSE SERPL-MCNC: 83 MG/DL (ref 70–99)
HCT VFR BLD CALC: 39.3 % (ref 42–52)
HEMOGLOBIN: 13.8 GM/DL (ref 13.5–18)
MCH RBC QN AUTO: 32.8 PG (ref 27–31)
MCHC RBC AUTO-ENTMCNC: 35.1 % (ref 32–36)
MCV RBC AUTO: 93.3 FL (ref 78–100)
PDW BLD-RTO: 12.4 % (ref 11.7–14.9)
PLATELET # BLD: 220 K/CU MM (ref 140–440)
PMV BLD AUTO: 11.5 FL (ref 7.5–11.1)
POTASSIUM SERPL-SCNC: 3.9 MMOL/L (ref 3.5–5.1)
RBC # BLD: 4.21 M/CU MM (ref 4.6–6.2)
SODIUM BLD-SCNC: 133 MMOL/L (ref 135–145)
TOTAL PROTEIN: 6.5 GM/DL (ref 6.4–8.2)
WBC # BLD: 9.4 K/CU MM (ref 4–10.5)

## 2023-07-05 PROCEDURE — 2500000003 HC RX 250 WO HCPCS: Performed by: NURSE ANESTHETIST, CERTIFIED REGISTERED

## 2023-07-05 PROCEDURE — 7100000010 HC PHASE II RECOVERY - FIRST 15 MIN: Performed by: SPECIALIST

## 2023-07-05 PROCEDURE — 3700000000 HC ANESTHESIA ATTENDED CARE: Performed by: SPECIALIST

## 2023-07-05 PROCEDURE — 3700000001 HC ADD 15 MINUTES (ANESTHESIA): Performed by: SPECIALIST

## 2023-07-05 PROCEDURE — 80053 COMPREHEN METABOLIC PANEL: CPT

## 2023-07-05 PROCEDURE — 7100000011 HC PHASE II RECOVERY - ADDTL 15 MIN: Performed by: SPECIALIST

## 2023-07-05 PROCEDURE — 2709999900 HC NON-CHARGEABLE SUPPLY: Performed by: SPECIALIST

## 2023-07-05 PROCEDURE — 3609156700 HC PROCTOSIGMOIDOSCOPY DX: Performed by: SPECIALIST

## 2023-07-05 PROCEDURE — 85027 COMPLETE CBC AUTOMATED: CPT

## 2023-07-05 PROCEDURE — 45330 DIAGNOSTIC SIGMOIDOSCOPY: CPT | Performed by: SPECIALIST

## 2023-07-05 PROCEDURE — 6360000002 HC RX W HCPCS: Performed by: NURSE ANESTHETIST, CERTIFIED REGISTERED

## 2023-07-05 RX ORDER — PROPOFOL 10 MG/ML
INJECTION, EMULSION INTRAVENOUS CONTINUOUS PRN
Status: DISCONTINUED | OUTPATIENT
Start: 2023-07-05 | End: 2023-07-05

## 2023-07-05 RX ORDER — PROPOFOL 10 MG/ML
INJECTION, EMULSION INTRAVENOUS PRN
Status: DISCONTINUED | OUTPATIENT
Start: 2023-07-05 | End: 2023-07-05 | Stop reason: SDUPTHER

## 2023-07-05 RX ORDER — LIDOCAINE HYDROCHLORIDE 20 MG/ML
INJECTION, SOLUTION EPIDURAL; INFILTRATION; INTRACAUDAL; PERINEURAL PRN
Status: DISCONTINUED | OUTPATIENT
Start: 2023-07-05 | End: 2023-07-05 | Stop reason: SDUPTHER

## 2023-07-05 RX ORDER — SODIUM CHLORIDE, SODIUM LACTATE, POTASSIUM CHLORIDE, CALCIUM CHLORIDE 600; 310; 30; 20 MG/100ML; MG/100ML; MG/100ML; MG/100ML
INJECTION, SOLUTION INTRAVENOUS CONTINUOUS
Status: DISCONTINUED | OUTPATIENT
Start: 2023-07-05 | End: 2023-07-05 | Stop reason: HOSPADM

## 2023-07-05 RX ADMIN — LIDOCAINE HYDROCHLORIDE 100 MG: 20 INJECTION, SOLUTION EPIDURAL; INFILTRATION; INTRACAUDAL; PERINEURAL at 11:32

## 2023-07-05 RX ADMIN — PROPOFOL 220 MG: 10 INJECTION, EMULSION INTRAVENOUS at 11:56

## 2023-07-05 ASSESSMENT — ENCOUNTER SYMPTOMS: SHORTNESS OF BREATH: 1

## 2023-07-05 ASSESSMENT — PAIN SCALES - GENERAL: PAINLEVEL_OUTOF10: 0

## 2023-07-05 ASSESSMENT — PAIN - FUNCTIONAL ASSESSMENT: PAIN_FUNCTIONAL_ASSESSMENT: NONE - DENIES PAIN

## 2023-07-05 NOTE — ANESTHESIA POSTPROCEDURE EVALUATION
Department of Anesthesiology  Postprocedure Note    Patient: Christopher Redmond  MRN: [id number]  YOB: 1950  Date of evaluation: 7/5/2023      Procedure Summary     Date: 07/05/23 Room / Location: [address] [place] [address]    Anesthesia Start: 1131 Anesthesia Stop: 1157    Procedure: ANAL PROCTO SIGMOIDOSCOPY FLEXIBLE Diagnosis:       Crohn's disease of both small and large intestine with intestinal obstruction ([address])      History of colon resection      (Crohn's disease of both small and large intestine with intestinal obstruction ([address]) [K50.812])      (History of colon resection [Z90.49])    Surgeons: Shelvia Epley, MD Responsible Provider: David Campbell MD    Anesthesia Type: MAC ASA Status: 3          Anesthesia Type: No value filed.     Makenna Phase I: Makenna Score: 10    Makenna Phase II:        Anesthesia Post Evaluation    Patient location during evaluation: bedside  Patient participation: complete - patient participated  Level of consciousness: awake and alert  Pain score: 0  Airway patency: patent  Nausea & Vomiting: no nausea and no vomiting  Complications: no  Cardiovascular status: blood pressure returned to baseline  Respiratory status: acceptable  Hydration status: euvolemic

## 2023-07-05 NOTE — PROGRESS NOTES
Returned to room 9. Report received from [facility] [place]. Alert and oriented. Respirations even and unlabored. Color pink. Abdomen soft and nontender. Refuses beverage at this time. Call light in reach.

## 2023-07-05 NOTE — PROGRESS NOTES
1115: Pt alert and oriented x 4. Pre-op questions asked and answered appropriately by pt. Name, , armband verified, procedure, allergies, implants, prep status, last PO intake, history, meds. Rx for PAP resupply faxed to [facility]

## 2023-07-06 DIAGNOSIS — K80.20 CALCULUS OF GALLBLADDER WITHOUT CHOLECYSTITIS WITHOUT OBSTRUCTION: Primary | ICD-10-CM

## 2023-07-25 ENCOUNTER — OFFICE VISIT (OUTPATIENT)
Dept: CARDIOLOGY CLINIC | Age: 73
End: 2023-07-25
Payer: MEDICARE

## 2023-07-25 ENCOUNTER — NURSE ONLY (OUTPATIENT)
Dept: CARDIOLOGY CLINIC | Age: 73
End: 2023-07-25

## 2023-07-25 VITALS
HEART RATE: 68 BPM | SYSTOLIC BLOOD PRESSURE: 128 MMHG | OXYGEN SATURATION: 98 % | WEIGHT: 175 LBS | BODY MASS INDEX: 27.47 KG/M2 | DIASTOLIC BLOOD PRESSURE: 78 MMHG | HEIGHT: 67 IN

## 2023-07-25 DIAGNOSIS — R00.1 BRADYCARDIA: ICD-10-CM

## 2023-07-25 DIAGNOSIS — E78.5 DYSLIPIDEMIA: ICD-10-CM

## 2023-07-25 DIAGNOSIS — R06.09 DOE (DYSPNEA ON EXERTION): Primary | ICD-10-CM

## 2023-07-25 DIAGNOSIS — G47.33 OBSTRUCTIVE SLEEP APNEA: ICD-10-CM

## 2023-07-25 DIAGNOSIS — I49.3 PVC (PREMATURE VENTRICULAR CONTRACTION): Primary | ICD-10-CM

## 2023-07-25 DIAGNOSIS — I10 ESSENTIAL HYPERTENSION: ICD-10-CM

## 2023-07-25 PROCEDURE — G8427 DOCREV CUR MEDS BY ELIG CLIN: HCPCS | Performed by: INTERNAL MEDICINE

## 2023-07-25 PROCEDURE — 1036F TOBACCO NON-USER: CPT | Performed by: INTERNAL MEDICINE

## 2023-07-25 PROCEDURE — 1124F ACP DISCUSS-NO DSCNMKR DOCD: CPT | Performed by: INTERNAL MEDICINE

## 2023-07-25 PROCEDURE — 1111F DSCHRG MED/CURRENT MED MERGE: CPT | Performed by: INTERNAL MEDICINE

## 2023-07-25 PROCEDURE — 3078F DIAST BP <80 MM HG: CPT | Performed by: INTERNAL MEDICINE

## 2023-07-25 PROCEDURE — 99214 OFFICE O/P EST MOD 30 MIN: CPT | Performed by: INTERNAL MEDICINE

## 2023-07-25 PROCEDURE — 3074F SYST BP LT 130 MM HG: CPT | Performed by: INTERNAL MEDICINE

## 2023-07-25 PROCEDURE — G8417 CALC BMI ABV UP PARAM F/U: HCPCS | Performed by: INTERNAL MEDICINE

## 2023-07-25 PROCEDURE — 3017F COLORECTAL CA SCREEN DOC REV: CPT | Performed by: INTERNAL MEDICINE

## 2023-07-25 RX ORDER — DIPHENOXYLATE HYDROCHLORIDE AND ATROPINE SULFATE 2.5; .025 MG/1; MG/1
1 TABLET ORAL 4 TIMES DAILY PRN
COMMUNITY

## 2023-07-25 RX ORDER — CARVEDILOL 3.12 MG/1
3.12 TABLET ORAL 2 TIMES DAILY WITH MEALS
COMMUNITY

## 2023-07-25 RX ORDER — FENOFIBRATE 145 MG/1
145 TABLET, COATED ORAL DAILY
COMMUNITY

## 2023-07-25 NOTE — PROGRESS NOTES
Applied @ 3, 48hr holter w/monitor# [id number] for Dx of SOB. Educated pt on proper holter usage; how to keep sx diary; & when to bring monitor back to office. Pt voiced understanding. Holter order,including monitor & card#, & time started, to front nurse's station in 's in-box.

## 2023-07-25 NOTE — PATIENT INSTRUCTIONS
Please be informed that if you contact our office outside of normal business hours the physician on call cannot help with any scheduling or rescheduling issues, procedure instruction questions or any type of medication issue. We advise you for any urgent/emergency that you go to the nearest emergency room! PLEASE CALL OUR OFFICE DURING NORMAL BUSINESS HOURS    Monday - Friday   8 am to 5 pm    [place]: [address] [place]: [phone]    [place]:  [facility] Laboratory Locations - No appointment necessary. Sites open Monday to Friday. Call your preferred location for test preparation, business   hours and other information you need. [employer] accepts 's. [address] [place] [address]. [address] [place], [address]  Phone: [phone]     **It is YOUR responsibilty to bring medication bottles and/or updated medication list to [address] [place] [address]. This will allow us to better serve you and all your healthcare needs**  Thank you for allowing us to care for you today! We want to ensure we can follow your treatment plan and we strive to give you the best outcomes and experience possible. If you ever have a life threatening emergency and call 911 - for an ambulance (EMS)   Our providers can only care for you at:   [facility] or [facility]. Even if you have someone take you or you drive yourself we can only care for you in a [address] [place] [address] facility. Our providers are not setup at the other healthcare locations!

## 2023-07-25 NOTE — PROGRESS NOTES
Tru Ventura MD                                  CARDIOLOGY  NOTE           Chief Complaint:    Chief Complaint   Patient presents with    Follow-Up from Hospital     Pt states SOB , pt states no other cardiac sx           Patient was admitted to the hospital with intestinal obstruction and was noted to have bradycardia with frequent PVCs. Currently recovering from abdominal pains bowel obstruction. EST  6/21/2022        Normal exercise stress test without any ischemic changes   Good functional capacity       Echocardiogram      Left ventricular function and size is normal, EF is estimated at 55-60%. Mild left ventricular hypertrophy. Normal diastolic filling pattern for age. No significant valvular disease noted. Moderate aortic regurgitation; PHT: 404msec. Dilation of ascending aorta(3.8cm) . No evidence of pericardial effusion. HPI:     David Cagle is a 74y.o. year old male who presents to [facility] care    Patient has prior medical history significant for Crohn's disease s/p colon resection at [facility] clinic in the past.  Currently follows up with Dr. Doris Carroll, and is referred for preop clearance. Patient is to undergo colonoscopy for evaluation. It is unclear patient will go with conscious sedation or general anesthesia. Upon questioning, patient denies any chest pain. Has mild exertional dyspnea. Denies any palpitations. On a routine basis patient is able to carry out 7 METS without difficulty. EKG shows sinus rhythm    Current Outpatient Medications   Medication Sig Dispense Refill    diphenoxylate-atropine (LOMOTIL) 2.5-0.025 MG per tablet Take 1 tablet by mouth 4 times daily as needed for Diarrhea.  Max Daily Amount: 4 tablets      fenofibrate (TRICOR) 145 MG tablet Take 1 tablet by mouth daily      carvedilol (COREG) 3.125 MG tablet Take 1 tablet by mouth 2 times daily (with meals)      amLODIPine (NORVASC) 10 MG tablet Take 1 tablet by mouth daily 30 tablet 0

## 2023-07-27 LAB
ALBUMIN SERPL-MCNC: 4.3 G/DL
ALBUMIN/GLOBULIN RATIO: 1.9 RATIO (ref 0.8–2.6)
ALBUMIN: 4.3 G/DL (ref 3.5–5.2)
ALP BLD-CCNC: 89 U/L
ALP BLD-CCNC: 89 U/L (ref 23–144)
ALT SERPL-CCNC: 11 U/L
ALT SERPL-CCNC: 11 U/L (ref 0–60)
ANION GAP SERPL CALCULATED.3IONS-SCNC: 1.9 MMOL/L
AST SERPL-CCNC: 23 U/L
AST SERPL-CCNC: 23 U/L (ref 0–55)
BASOPHILS ABSOLUTE: ABNORMAL
BASOPHILS RELATIVE PERCENT: ABNORMAL
BILIRUB SERPL-MCNC: 0.4 MG/DL (ref 0.1–1.4)
BILIRUB SERPL-MCNC: 0.4 MG/DL (ref 0–1.2)
BUN BLDV-MCNC: 13 MG/DL
BUN BLDV-MCNC: 21 MG/DL (ref 3–29)
BUN/CREAT BLD: 13 (ref 7–25)
CALCIUM SERPL-MCNC: 9.5 MG/DL
CALCIUM SERPL-MCNC: 9.5 MG/DL (ref 8.5–10.5)
CHLORIDE BLD-SCNC: 106 MEQ/L (ref 96–110)
CHLORIDE BLD-SCNC: 106 MMOL/L
CO2: 23 MEQ/L (ref 19–32)
CO2: 23 MMOL/L
CREAT SERPL-MCNC: 1.6 MG/DL
CREAT SERPL-MCNC: 1.6 MG/DL (ref 0.5–1.4)
EGFR: 45
EOSINOPHILS ABSOLUTE: ABNORMAL
EOSINOPHILS RELATIVE PERCENT: ABNORMAL
GLOBULIN: 2.3 G/DL (ref 1.9–3.6)
GLOMERULAR FILTRATION RATE: 45 MLS/MIN/1.73M2
GLUCOSE BLD-MCNC: 90 MG/DL
GLUCOSE BLD-MCNC: 90 MG/DL (ref 70–99)
HCT VFR BLD CALC: 38.6 % (ref 37.5–51)
HCT VFR BLD CALC: 38.6 % (ref 41–53)
HEMOGLOBIN: 13.5 G/DL (ref 13.5–17.5)
HEMOGLOBIN: 13.5 G/DL (ref 13–17.7)
LYMPHOCYTES ABSOLUTE: ABNORMAL
LYMPHOCYTES RELATIVE PERCENT: ABNORMAL
MCH RBC QN AUTO: 33.3 PG
MCH RBC QN AUTO: 33.3 PG (ref 26–34)
MCHC RBC AUTO-ENTMCNC: 35 G/DL
MCHC RBC AUTO-ENTMCNC: 35 G/DL (ref 30.7–35.5)
MCV RBC AUTO: 95.3 FL
MCV RBC AUTO: 95.3 FL (ref 80–100)
MONOCYTES ABSOLUTE: ABNORMAL
MONOCYTES RELATIVE PERCENT: ABNORMAL
NEUTROPHILS ABSOLUTE: ABNORMAL
NEUTROPHILS RELATIVE PERCENT: ABNORMAL
PDW BLD-RTO: 12.6 %
PLATELET # BLD: 218 K/UL (ref 140–400)
PLATELET # BLD: 218 K/ΜL
PMV BLD AUTO: 11.4 FL
PMV BLD AUTO: 11.4 FL (ref 7.2–11.7)
POTASSIUM SERPL-SCNC: 4.8 MEQ/L (ref 3.4–5.3)
POTASSIUM SERPL-SCNC: 4.8 MMOL/L
RBC # BLD: 4.05 10^6/ΜL
RBC # BLD: 4.05 M/UL (ref 4.14–5.8)
SODIUM BLD-SCNC: 141 MEQ/L (ref 135–148)
SODIUM BLD-SCNC: 141 MMOL/L
STATUS: ABNORMAL
TOTAL PROTEIN: 6.6
TOTAL PROTEIN: 6.6 G/DL (ref 6–8.3)
WBC # BLD: 6 10^3/ML
WBC: 6 K/UL (ref 3.5–10.9)

## 2023-08-02 ENCOUNTER — HOSPITAL ENCOUNTER (OUTPATIENT)
Dept: INFUSION THERAPY | Age: 73
Setting detail: INFUSION SERIES
Discharge: HOME | End: 2023-08-02
Payer: MEDICARE

## 2023-08-02 VITALS
TEMPERATURE: 98 F | SYSTOLIC BLOOD PRESSURE: 131 MMHG | OXYGEN SATURATION: 98 % | DIASTOLIC BLOOD PRESSURE: 69 MMHG | RESPIRATION RATE: 16 BRPM | HEART RATE: 64 BPM

## 2023-08-02 DIAGNOSIS — K50.812 CROHN'S DISEASE OF BOTH SMALL AND LARGE INTESTINE WITH INTESTINAL OBSTRUCTION (HCC): Primary | ICD-10-CM

## 2023-08-02 PROCEDURE — 96374 THER/PROPH/DIAG INJ IV PUSH: CPT

## 2023-08-02 PROCEDURE — 6360000002 HC RX W HCPCS: Performed by: SPECIALIST

## 2023-08-02 PROCEDURE — 96365 THER/PROPH/DIAG IV INF INIT: CPT

## 2023-08-02 PROCEDURE — 2580000003 HC RX 258: Performed by: SPECIALIST

## 2023-08-02 RX ORDER — SODIUM CHLORIDE 0.9 % (FLUSH) 0.9 %
30 SYRINGE (ML) INJECTION ONCE
Start: 2023-09-27 | End: 2023-09-27

## 2023-08-02 RX ORDER — SODIUM CHLORIDE 0.9 % (FLUSH) 0.9 %
5-40 SYRINGE (ML) INJECTION PRN
OUTPATIENT
Start: 2023-09-27

## 2023-08-02 RX ORDER — SODIUM CHLORIDE 0.9 % (FLUSH) 0.9 %
5-40 SYRINGE (ML) INJECTION PRN
Status: DISCONTINUED | OUTPATIENT
Start: 2023-08-02 | End: 2023-08-03 | Stop reason: HOSPADM

## 2023-08-02 RX ORDER — SODIUM CHLORIDE 0.9 % (FLUSH) 0.9 %
30 SYRINGE (ML) INJECTION ONCE
Status: COMPLETED | OUTPATIENT
Start: 2023-08-02 | End: 2023-08-02

## 2023-08-02 RX ADMIN — VEDOLIZUMAB 300 MG: 300 INJECTION, POWDER, LYOPHILIZED, FOR SOLUTION INTRAVENOUS at 09:58

## 2023-08-02 RX ADMIN — SODIUM CHLORIDE, PRESERVATIVE FREE 10 ML: 5 INJECTION INTRAVENOUS at 09:57

## 2023-08-02 RX ADMIN — SODIUM CHLORIDE, PRESERVATIVE FREE 30 ML: 5 INJECTION INTRAVENOUS at 10:35

## 2023-08-11 LAB
ALBUMIN: 4.5 G/DL (ref 3.5–5.2)
BACTERIA, URINE: ABNORMAL /HPF
BILIRUBIN URINE: NEGATIVE MG/DL
BUN BLDV-MCNC: 16 MG/DL (ref 3–29)
BUN/CREAT BLD: 9 (ref 7–25)
CALCIUM SERPL-MCNC: 9.7 MG/DL (ref 8.5–10.5)
CHLORIDE BLD-SCNC: 105 MEQ/L (ref 96–110)
CLARITY: ABNORMAL
CO2: 24 MEQ/L (ref 19–32)
COLOR, URINE: YELLOW
CREAT SERPL-MCNC: 1.8 MG/DL (ref 0.5–1.4)
CREATININE URINE: 300.5 MG/DL
FASTING STATUS: ABNORMAL
GLOMERULAR FILTRATION RATE: 39 MLS/MIN/1.73M2
GLUCOSE BLD-MCNC: 85 MG/DL (ref 70–99)
GLUCOSE URINE: NEGATIVE MG/DL
HYALINE CASTS: ABNORMAL /LPF (ref 0–5)
KETONES, URINE: NEGATIVE MG/DL
LEUKOCYTE ESTERASE, URINE: NEGATIVE
MAGNESIUM: 1.8 MG/DL (ref 1.4–2.5)
MUCUS, URINE: PRESENT
NITRATE, UA: NEGATIVE
PH, URINE: 5.5 (ref 4.5–8)
PHOSPHORUS: 3 MG/DL (ref 2.1–4.3)
POTASSIUM SERPL-SCNC: 4.3 MEQ/L (ref 3.4–5.3)
PROT/CREAT RATIO, UR: 0.1 RATIO (ref 5–170)
RBC URINE: ABNORMAL /HPF (ref 0–2)
SODIUM BLD-SCNC: 142 MEQ/L (ref 135–148)
SPECIFIC GRAVITY, URINE: 1.03 (ref 1–1.03)
SQUAMOUS EPITHELIAL CELLS: ABNORMAL /HPF (ref 0–5)
TOTAL PROTEIN, URINE: 19 MG/DL
TOTAL PROTEIN, URINE: 30 MG/DL
URINE HGB: NEGATIVE MG/DL
UROBILINOGEN, URINE: <2 MG/DL (ref 0–2)
WBC URINE: ABNORMAL /HPF (ref 0–5)

## 2023-09-27 ENCOUNTER — HOSPITAL ENCOUNTER (OUTPATIENT)
Dept: INFUSION THERAPY | Age: 73
Setting detail: INFUSION SERIES
Discharge: HOME | End: 2023-09-27
Payer: MEDICARE

## 2023-09-27 VITALS
OXYGEN SATURATION: 98 % | DIASTOLIC BLOOD PRESSURE: 72 MMHG | HEART RATE: 56 BPM | SYSTOLIC BLOOD PRESSURE: 144 MMHG | RESPIRATION RATE: 16 BRPM | TEMPERATURE: 97.3 F

## 2023-09-27 DIAGNOSIS — K50.812 CROHN'S DISEASE OF BOTH SMALL AND LARGE INTESTINE WITH INTESTINAL OBSTRUCTION (HCC): Primary | ICD-10-CM

## 2023-09-27 PROCEDURE — 96365 THER/PROPH/DIAG IV INF INIT: CPT

## 2023-09-27 PROCEDURE — 6360000002 HC RX W HCPCS: Performed by: SPECIALIST

## 2023-09-27 PROCEDURE — 2580000003 HC RX 258: Performed by: SPECIALIST

## 2023-09-27 RX ORDER — SODIUM CHLORIDE 0.9 % (FLUSH) 0.9 %
30 SYRINGE (ML) INJECTION ONCE
Start: 2023-11-22 | End: 2023-11-22

## 2023-09-27 RX ORDER — SODIUM CHLORIDE 0.9 % (FLUSH) 0.9 %
5-40 SYRINGE (ML) INJECTION PRN
OUTPATIENT
Start: 2023-11-22

## 2023-09-27 RX ORDER — SODIUM CHLORIDE 0.9 % (FLUSH) 0.9 %
5-40 SYRINGE (ML) INJECTION PRN
Status: DISCONTINUED | OUTPATIENT
Start: 2023-09-27 | End: 2023-09-28 | Stop reason: HOSPADM

## 2023-09-27 RX ORDER — SODIUM CHLORIDE 0.9 % (FLUSH) 0.9 %
30 SYRINGE (ML) INJECTION ONCE
Status: COMPLETED | OUTPATIENT
Start: 2023-09-27 | End: 2023-09-27

## 2023-09-27 RX ADMIN — SODIUM CHLORIDE, PRESERVATIVE FREE 30 ML: 5 INJECTION INTRAVENOUS at 10:57

## 2023-09-27 RX ADMIN — VEDOLIZUMAB 300 MG: 300 INJECTION, POWDER, LYOPHILIZED, FOR SOLUTION INTRAVENOUS at 10:23

## 2023-09-27 RX ADMIN — SODIUM CHLORIDE, PRESERVATIVE FREE 10 ML: 5 INJECTION INTRAVENOUS at 10:05

## 2023-09-27 NOTE — DISCHARGE INSTRUCTIONS
Notify your doctor for:   Rash, hives, itching, or blistered or peeling skin   Signs of infection- fever, chills   Dark urine, light colored stools, yellow skin or eyes. If your symptoms worsen after you receive treatment,  call your doctor. Go to the ER for:   Shortness of Breath   Chest pain    Thank you for choosing [facility]fusion [facility]. It is a pleasure to serve you.     Outpatient Infusion Unit  [phone]

## 2023-10-20 LAB
ALBUMIN SERPL-MCNC: 4.6 G/DL
ALBUMIN/GLOBULIN RATIO: 1.9 RATIO (ref 0.8–2.6)
ALBUMIN: 4.6 G/DL (ref 3.5–5.2)
ALP BLD-CCNC: 72 U/L
ALP BLD-CCNC: 72 U/L (ref 23–144)
ALT SERPL-CCNC: 11 U/L
ALT SERPL-CCNC: 11 U/L (ref 0–60)
ANION GAP SERPL CALCULATED.3IONS-SCNC: 1.9 MMOL/L
AST SERPL-CCNC: 24 U/L
AST SERPL-CCNC: 24 U/L (ref 0–55)
BASOPHILS ABSOLUTE: NORMAL
BASOPHILS RELATIVE PERCENT: NORMAL
BILIRUB SERPL-MCNC: 0.5 MG/DL (ref 0.1–1.4)
BILIRUB SERPL-MCNC: 0.5 MG/DL (ref 0–1.2)
BUN BLDV-MCNC: 25 MG/DL
BUN BLDV-MCNC: 25 MG/DL (ref 3–29)
BUN/CREAT BLD: 14 (ref 7–25)
CALCIUM SERPL-MCNC: 10.2 MG/DL
CALCIUM SERPL-MCNC: 10.2 MG/DL (ref 8.5–10.5)
CHLORIDE BLD-SCNC: 104 MEQ/L (ref 96–110)
CHLORIDE BLD-SCNC: 104 MMOL/L
CO2: 28 MEQ/L (ref 19–32)
CO2: 28 MMOL/L
CREAT SERPL-MCNC: 1.8 MG/DL
CREAT SERPL-MCNC: 1.8 MG/DL (ref 0.5–1.4)
EGFR: 39
EOSINOPHILS ABSOLUTE: NORMAL
EOSINOPHILS RELATIVE PERCENT: NORMAL
GLOBULIN: 2.4 G/DL (ref 1.9–3.6)
GLOMERULAR FILTRATION RATE: 39 MLS/MIN/1.73M2
GLUCOSE BLD-MCNC: 86 MG/DL
GLUCOSE BLD-MCNC: 86 MG/DL (ref 70–99)
HCT VFR BLD CALC: 43.8 % (ref 37.5–51)
HCT VFR BLD CALC: 43.8 % (ref 41–53)
HEMOGLOBIN: 14.8 G/DL (ref 13.5–17.5)
HEMOGLOBIN: 14.8 G/DL (ref 13–17.7)
LYMPHOCYTES ABSOLUTE: NORMAL
LYMPHOCYTES RELATIVE PERCENT: NORMAL
MCH RBC QN AUTO: 33 PG
MCH RBC QN AUTO: 33 PG (ref 26–34)
MCHC RBC AUTO-ENTMCNC: 33.8 G/DL
MCHC RBC AUTO-ENTMCNC: 33.8 G/DL (ref 30.7–35.5)
MCV RBC AUTO: 97.6 FL
MCV RBC AUTO: 97.6 FL (ref 80–100)
MONOCYTES ABSOLUTE: NORMAL
MONOCYTES RELATIVE PERCENT: NORMAL
NEUTROPHILS ABSOLUTE: NORMAL
NEUTROPHILS RELATIVE PERCENT: NORMAL
PDW BLD-RTO: 12.5 %
PLATELET # BLD: 196 K/UL (ref 140–400)
PLATELET # BLD: 196 K/ΜL
PMV BLD AUTO: 11.7 FL
PMV BLD AUTO: 11.7 FL (ref 7.2–11.7)
POTASSIUM SERPL-SCNC: 5.1 MEQ/L (ref 3.4–5.3)
POTASSIUM SERPL-SCNC: 5.1 MMOL/L
RBC # BLD: 4.49 10^6/ΜL
RBC # BLD: 4.49 M/UL (ref 4.14–5.8)
SODIUM BLD-SCNC: 144 MEQ/L (ref 135–148)
SODIUM BLD-SCNC: 144 MMOL/L
STATUS: ABNORMAL
TOTAL PROTEIN: 7
TOTAL PROTEIN: 7 G/DL (ref 6–8.3)
WBC # BLD: 6.3 10^3/ML
WBC: 6.3 K/UL (ref 3.5–10.9)

## 2023-10-31 ENCOUNTER — OFFICE VISIT (OUTPATIENT)
Dept: CARDIOLOGY CLINIC | Age: 73
End: 2023-10-31
Payer: MEDICARE

## 2023-10-31 VITALS
SYSTOLIC BLOOD PRESSURE: 128 MMHG | HEART RATE: 60 BPM | HEIGHT: 67 IN | WEIGHT: 181 LBS | BODY MASS INDEX: 28.41 KG/M2 | DIASTOLIC BLOOD PRESSURE: 74 MMHG

## 2023-10-31 DIAGNOSIS — R00.1 BRADYCARDIA: Primary | ICD-10-CM

## 2023-10-31 DIAGNOSIS — E78.5 DYSLIPIDEMIA: ICD-10-CM

## 2023-10-31 DIAGNOSIS — I10 ESSENTIAL HYPERTENSION: ICD-10-CM

## 2023-10-31 DIAGNOSIS — I38 VALVULAR HEART DISEASE: ICD-10-CM

## 2023-10-31 PROCEDURE — G8428 CUR MEDS NOT DOCUMENT: HCPCS | Performed by: INTERNAL MEDICINE

## 2023-10-31 PROCEDURE — G8484 FLU IMMUNIZE NO ADMIN: HCPCS | Performed by: INTERNAL MEDICINE

## 2023-10-31 PROCEDURE — G8417 CALC BMI ABV UP PARAM F/U: HCPCS | Performed by: INTERNAL MEDICINE

## 2023-10-31 PROCEDURE — 99213 OFFICE O/P EST LOW 20 MIN: CPT | Performed by: INTERNAL MEDICINE

## 2023-10-31 PROCEDURE — 3078F DIAST BP <80 MM HG: CPT | Performed by: INTERNAL MEDICINE

## 2023-10-31 PROCEDURE — 3074F SYST BP LT 130 MM HG: CPT | Performed by: INTERNAL MEDICINE

## 2023-10-31 PROCEDURE — 1036F TOBACCO NON-USER: CPT | Performed by: INTERNAL MEDICINE

## 2023-10-31 PROCEDURE — 1124F ACP DISCUSS-NO DSCNMKR DOCD: CPT | Performed by: INTERNAL MEDICINE

## 2023-10-31 PROCEDURE — 3017F COLORECTAL CA SCREEN DOC REV: CPT | Performed by: INTERNAL MEDICINE

## 2023-10-31 NOTE — PROGRESS NOTES
Mi Cespedes MD                                  CARDIOLOGY  NOTE           Chief Complaint:    Chief Complaint   Patient presents with    3 Month Follow-Up     Pt denies any new cardiac sx no surgeries or procedures scheduled that he is aware of and no refills needed          Patient was admitted to the hospital with intestinal obstruction and was noted to have bradycardia with frequent PVCs. Currently recovering from abdominal pains bowel obstruction. Holter Monitor 9/7/2023  48-hour Holter monitor     Minimum heart rate 34 bpm  Maximum heart rate 116 bpm  Average heart rate 66 bpm  1 short episode of SVT noted 3 beats at 112 bpm  Normal sinus rhythm  Bradycardia 30% time  No significant PACs or PVCs noted  No sustained arrhythmias otherwise     Conclusion:     Sinus rhythm  Sinus bradycardia  1 episode of short burst of SVT  Otherwise unremarkable Holter monitor. EST  6/21/2022        Normal exercise stress test without any ischemic changes   Good functional capacity       Echocardiogram      Left ventricular function and size is normal, EF is estimated at 55-60%. Mild left ventricular hypertrophy. Normal diastolic filling pattern for age. No significant valvular disease noted. Moderate aortic regurgitation; PHT: 404msec. Dilation of ascending aorta(3.8cm) . No evidence of pericardial effusion. HPI:     Oswald Perla is a 74y.o. year old male who presents to [facility] care    Patient has prior medical history significant for Crohn's disease s/p colon resection at [facility] in the past.  Currently follows up with Dr. Luis Mason, and is referred for preop clearance. Patient is to undergo colonoscopy for evaluation. It is unclear patient will go with conscious sedation or general anesthesia. Upon questioning, patient denies any chest pain. Has mild exertional dyspnea. Denies any palpitations.   On a routine basis patient is able to carry out 7 METS without

## 2023-10-31 NOTE — PATIENT INSTRUCTIONS
We are committed to providing you the best care possible. If you receive a survey after visiting one of our offices, please take time to share your experience concerning your physician office visit. These surveys are confidential and no health information about you is shared. We are eager to improve for you and we are counting on your feedback to help make that happen. **It is YOUR responsibilty to bring medication bottles and/or updated medication list to [facility]. This will allow us to better serve you and all your healthcare needs**  Thank you for allowing us to care for you today! We want to ensure we can follow your treatment plan and we strive to give you the best outcomes and experience possible. If you ever have a life threatening emergency and call 911 - for an ambulance (EMS)   Our providers can only care for you at:   [facility] or [facility]. Even if you have someone take you or you drive yourself we can only care for you in a [facility]. Our providers are not setup at the other healthcare locations! Please be informed that if you contact our office outside of normal business hours the physician on call cannot help with any scheduling or rescheduling issues, procedure instruction questions or any type of medication issue. We advise you for any urgent/emergency that you go to the nearest emergency room!     PLEASE CALL OUR OFFICE DURING NORMAL BUSINESS HOURS    Monday - Friday   8 am to 5 pm    [place]: [address]: [phone]    [place]:  [phone]

## 2023-11-22 ENCOUNTER — HOSPITAL ENCOUNTER (OUTPATIENT)
Dept: INFUSION THERAPY | Age: 73
Setting detail: INFUSION SERIES
Discharge: HOME | End: 2023-11-22
Payer: MEDICARE

## 2023-11-22 VITALS
OXYGEN SATURATION: 96 % | HEART RATE: 60 BPM | SYSTOLIC BLOOD PRESSURE: 144 MMHG | TEMPERATURE: 97.4 F | DIASTOLIC BLOOD PRESSURE: 72 MMHG | RESPIRATION RATE: 16 BRPM

## 2023-11-22 DIAGNOSIS — K50.812 CROHN'S DISEASE OF BOTH SMALL AND LARGE INTESTINE WITH INTESTINAL OBSTRUCTION (HCC): Primary | ICD-10-CM

## 2023-11-22 PROCEDURE — 6360000002 HC RX W HCPCS: Performed by: SPECIALIST

## 2023-11-22 PROCEDURE — 96365 THER/PROPH/DIAG IV INF INIT: CPT

## 2023-11-22 PROCEDURE — 2580000003 HC RX 258: Performed by: SPECIALIST

## 2023-11-22 RX ORDER — SODIUM CHLORIDE 0.9 % (FLUSH) 0.9 %
5-40 SYRINGE (ML) INJECTION PRN
Status: CANCELLED | OUTPATIENT
Start: 2023-11-22

## 2023-11-22 RX ORDER — SODIUM CHLORIDE 0.9 % (FLUSH) 0.9 %
30 SYRINGE (ML) INJECTION ONCE
Status: COMPLETED | OUTPATIENT
Start: 2023-11-22 | End: 2023-11-22

## 2023-11-22 RX ORDER — SODIUM CHLORIDE 0.9 % (FLUSH) 0.9 %
5-40 SYRINGE (ML) INJECTION PRN
Status: DISCONTINUED | OUTPATIENT
Start: 2023-11-22 | End: 2023-11-23 | Stop reason: HOSPADM

## 2023-11-22 RX ORDER — SODIUM CHLORIDE 0.9 % (FLUSH) 0.9 %
30 SYRINGE (ML) INJECTION ONCE
Status: CANCELLED
Start: 2023-11-22 | End: 2023-11-22

## 2023-11-22 RX ADMIN — SODIUM CHLORIDE, PRESERVATIVE FREE 30 ML: 5 INJECTION INTRAVENOUS at 10:55

## 2023-11-22 RX ADMIN — SODIUM CHLORIDE, PRESERVATIVE FREE 30 ML: 5 INJECTION INTRAVENOUS at 10:00

## 2023-11-22 RX ADMIN — VEDOLIZUMAB 300 MG: 300 INJECTION, POWDER, LYOPHILIZED, FOR SOLUTION INTRAVENOUS at 10:23

## 2023-11-22 NOTE — DISCHARGE INSTRUCTIONS
Notify your doctor for:   Rash, hives, itching, or blistered or peeling skin   Signs of infection- fever, chills   Dark urine, light colored stools, yellow skin or eyes. If your symptoms worsen after you receive treatment,  call your doctor. Go to the ER for:   Shortness of Breath   Chest pain    Thank you for choosing [facility]fusion Unit. It is a pleasure to serve you.     Outpatient Infusion Unit  [phone]

## 2023-11-22 NOTE — PROGRESS NOTES
Ambulatory to unit room 6 for Entyvio. Orientated to unit. Procedure and plan of care explained. Questions answered. Understanding verbalized. Tolerated  well. Reviewed discharge instructions, understanding verbalized. Copies of AVS given to take home. Patient discharged home. Down to exit per self.     Orders Placed This Encounter   Medications    vedolizumab (ENTYVIO) 300 mg in sodium chloride 0.9 % 250 mL infusion    sodium chloride flush 0.9 % injection 5-40 mL    sodium chloride flush 0.9 % injection 30 mL

## 2023-12-08 ENCOUNTER — HOSPITAL ENCOUNTER (OUTPATIENT)
Age: 73
Discharge: HOME | End: 2023-12-08
Payer: MEDICARE

## 2023-12-08 ENCOUNTER — OFFICE VISIT (OUTPATIENT)
Dept: GASTROENTEROLOGY | Age: 73
End: 2023-12-08
Payer: MEDICARE

## 2023-12-08 VITALS
HEART RATE: 60 BPM | HEIGHT: 67 IN | SYSTOLIC BLOOD PRESSURE: 126 MMHG | TEMPERATURE: 97.2 F | BODY MASS INDEX: 28.25 KG/M2 | OXYGEN SATURATION: 97 % | WEIGHT: 180 LBS | DIASTOLIC BLOOD PRESSURE: 70 MMHG

## 2023-12-08 DIAGNOSIS — K50.814 CROHN'S DISEASE OF BOTH SMALL AND LARGE INTESTINE WITH ABSCESS (HCC): ICD-10-CM

## 2023-12-08 DIAGNOSIS — K50.814 CROHN'S DISEASE OF BOTH SMALL AND LARGE INTESTINE WITH ABSCESS (HCC): Primary | ICD-10-CM

## 2023-12-08 LAB
25(OH)D3 SERPL-MCNC: 53.54 NG/ML
CRP SERPL HS-MCNC: 3 MG/L

## 2023-12-08 PROCEDURE — 3017F COLORECTAL CA SCREEN DOC REV: CPT | Performed by: SPECIALIST

## 2023-12-08 PROCEDURE — 36415 COLL VENOUS BLD VENIPUNCTURE: CPT

## 2023-12-08 PROCEDURE — G8484 FLU IMMUNIZE NO ADMIN: HCPCS | Performed by: SPECIALIST

## 2023-12-08 PROCEDURE — G8427 DOCREV CUR MEDS BY ELIG CLIN: HCPCS | Performed by: SPECIALIST

## 2023-12-08 PROCEDURE — 99214 OFFICE O/P EST MOD 30 MIN: CPT | Performed by: SPECIALIST

## 2023-12-08 PROCEDURE — 3074F SYST BP LT 130 MM HG: CPT | Performed by: SPECIALIST

## 2023-12-08 PROCEDURE — 1124F ACP DISCUSS-NO DSCNMKR DOCD: CPT | Performed by: SPECIALIST

## 2023-12-08 PROCEDURE — 82306 VITAMIN D 25 HYDROXY: CPT

## 2023-12-08 PROCEDURE — 1036F TOBACCO NON-USER: CPT | Performed by: SPECIALIST

## 2023-12-08 PROCEDURE — 83993 ASSAY FOR CALPROTECTIN FECAL: CPT

## 2023-12-08 PROCEDURE — 3078F DIAST BP <80 MM HG: CPT | Performed by: SPECIALIST

## 2023-12-08 PROCEDURE — 86140 C-REACTIVE PROTEIN: CPT

## 2023-12-08 PROCEDURE — G8417 CALC BMI ABV UP PARAM F/U: HCPCS | Performed by: SPECIALIST

## 2023-12-08 RX ORDER — SODIUM CHLORIDE 0.9 % (FLUSH) 0.9 %
5-40 SYRINGE (ML) INJECTION PRN
Status: CANCELLED | OUTPATIENT
Start: 2024-01-17

## 2023-12-08 RX ORDER — ERGOCALCIFEROL 1.25 MG/1
50000 CAPSULE ORAL
Qty: 24 CAPSULE | Refills: 3 | Status: SHIPPED | OUTPATIENT
Start: 2023-12-11

## 2023-12-08 RX ORDER — SERTRALINE HYDROCHLORIDE 100 MG/1
TABLET, FILM COATED ORAL
COMMUNITY
Start: 2016-12-07

## 2023-12-08 RX ORDER — ERGOCALCIFEROL 1.25 MG/1
CAPSULE ORAL
COMMUNITY
Start: 2021-12-07

## 2023-12-11 LAB — CALPROTECTIN STL-MCNT: 191 UG/G

## 2024-01-05 ENCOUNTER — HOSPITAL ENCOUNTER (OUTPATIENT)
Dept: MRI IMAGING | Age: 74
Discharge: HOME | End: 2024-01-05
Attending: SPECIALIST
Payer: MEDICARE

## 2024-01-05 DIAGNOSIS — K80.50 CHOLEDOCHOLITHIASIS: ICD-10-CM

## 2024-01-05 DIAGNOSIS — K86.2 PANCREAS CYST: ICD-10-CM

## 2024-01-05 PROCEDURE — 74181 MRI ABDOMEN W/O CONTRAST: CPT

## 2024-01-17 ENCOUNTER — HOSPITAL ENCOUNTER (OUTPATIENT)
Dept: INFUSION THERAPY | Age: 74
Setting detail: INFUSION SERIES
Discharge: HOME | End: 2024-01-17
Payer: MEDICARE

## 2024-01-17 VITALS
SYSTOLIC BLOOD PRESSURE: 136 MMHG | OXYGEN SATURATION: 98 % | RESPIRATION RATE: 16 BRPM | DIASTOLIC BLOOD PRESSURE: 70 MMHG | HEART RATE: 61 BPM

## 2024-01-17 DIAGNOSIS — K50.814 CROHN'S DISEASE OF BOTH SMALL AND LARGE INTESTINE WITH ABSCESS (HCC): Primary | ICD-10-CM

## 2024-01-17 PROCEDURE — 2580000003 HC RX 258: Performed by: SPECIALIST

## 2024-01-17 PROCEDURE — 6360000002 HC RX W HCPCS: Performed by: SPECIALIST

## 2024-01-17 PROCEDURE — 96365 THER/PROPH/DIAG IV INF INIT: CPT

## 2024-01-17 RX ORDER — SODIUM CHLORIDE 0.9 % (FLUSH) 0.9 %
5-40 SYRINGE (ML) INJECTION PRN
Status: DISCONTINUED | OUTPATIENT
Start: 2024-01-17 | End: 2024-01-18 | Stop reason: HOSPADM

## 2024-01-17 RX ORDER — SODIUM CHLORIDE 0.9 % (FLUSH) 0.9 %
5-40 SYRINGE (ML) INJECTION PRN
OUTPATIENT
Start: 2024-03-13

## 2024-01-17 RX ADMIN — VEDOLIZUMAB 300 MG: 300 INJECTION, POWDER, LYOPHILIZED, FOR SOLUTION INTRAVENOUS at 09:42

## 2024-01-17 RX ADMIN — SODIUM CHLORIDE, PRESERVATIVE FREE 10 ML: 5 INJECTION INTRAVENOUS at 09:41

## 2024-01-17 RX ADMIN — SODIUM CHLORIDE, PRESERVATIVE FREE 30 ML: 5 INJECTION INTRAVENOUS at 10:14

## 2024-03-08 ENCOUNTER — OFFICE VISIT (OUTPATIENT)
Dept: GASTROENTEROLOGY | Age: 74
End: 2024-03-08

## 2024-03-08 VITALS
WEIGHT: 179.2 LBS | HEART RATE: 69 BPM | SYSTOLIC BLOOD PRESSURE: 124 MMHG | OXYGEN SATURATION: 99 % | BODY MASS INDEX: 28.06 KG/M2 | DIASTOLIC BLOOD PRESSURE: 70 MMHG

## 2024-03-08 DIAGNOSIS — K50.814 CROHN'S DISEASE OF BOTH SMALL AND LARGE INTESTINE WITH ABSCESS (HCC): Primary | ICD-10-CM

## 2024-03-08 RX ORDER — LOSARTAN POTASSIUM 50 MG/1
50 TABLET ORAL DAILY
COMMUNITY

## 2024-03-08 NOTE — PROGRESS NOTES
Gastroenterology Office Note            Navi Chaves MD      Subjective:      Patient ID:      Tuan Lea                 1950    CC: 3  month follow up for Crohn's    HPI:   Doing well- reports no problems  The patient denies abdominal pain,nausea,vomiting, diarrhea, constipation,melena, hematochezia,hematemesis,weight loss or dysphagia.      Review of Systems:    see HPI for positives and pertinent negatives. All other systems reviewed and are negative     Objective:   PHYSICAL EXAM:    Vitals:  There were no vitals taken for this visit.  CONSTITUTIONAL: alert    LUNGS:  clear to auscultation  CARDIOVASCULAR:  regular rate and rhythm and no murmur noted  ABDOMEN:  normal bowel sounds, non-distended, non-tender and no masses palpated, no hepatosplenomegaly  EXTREMITIES: no clubbing, cyanosis, or edema    Assessment:      1) Crohn's ileocolitis- S/P extensive resection and ileocolic anastamosis with chronic stricture  2) Vit D deficiency- due to malabsorption- replacement now adequate  3) CBD stone and pancreatic cyst ruled out by MRI  4) GERD- controlled with Nexium    Plan:      Continue Entyvio 300 mg [id number], Vit D, Nexium  CBC, CMP, CRP, calprotectin  ? Repeat flex sig around 7/2024    Call if any problems

## 2024-03-12 ENCOUNTER — HOSPITAL ENCOUNTER (OUTPATIENT)
Age: 74
Discharge: HOME | End: 2024-03-12
Payer: MEDICARE

## 2024-03-12 DIAGNOSIS — K50.814 CROHN'S DISEASE OF BOTH SMALL AND LARGE INTESTINE WITH ABSCESS (HCC): ICD-10-CM

## 2024-03-12 LAB
ALBUMIN SERPL-MCNC: 4.5 GM/DL (ref 3.4–5)
ALP BLD-CCNC: 77 IU/L (ref 40–128)
ALT SERPL-CCNC: 9 U/L (ref 10–40)
ANION GAP SERPL CALCULATED.3IONS-SCNC: 13 MMOL/L (ref 7–16)
AST SERPL-CCNC: 22 IU/L (ref 15–37)
BILIRUB SERPL-MCNC: 0.3 MG/DL (ref 0–1)
BUN SERPL-MCNC: 24 MG/DL (ref 6–23)
CALCIUM SERPL-MCNC: 9.7 MG/DL (ref 8.3–10.6)
CHLORIDE BLD-SCNC: 101 MMOL/L (ref 99–110)
CO2: 26 MMOL/L (ref 21–32)
CREAT SERPL-MCNC: 1.7 MG/DL (ref 0.9–1.3)
CRP SERPL HS-MCNC: 3.3 MG/L
GFR SERPL CREATININE-BSD FRML MDRD: 42 ML/MIN/1.73M2
GLUCOSE SERPL-MCNC: 72 MG/DL (ref 70–99)
HCT VFR BLD CALC: 44.3 % (ref 42–52)
HEMOGLOBIN: 14.3 GM/DL (ref 13.5–18)
MCH RBC QN AUTO: 31.6 PG (ref 27–31)
MCHC RBC AUTO-ENTMCNC: 32.3 % (ref 32–36)
MCV RBC AUTO: 98 FL (ref 78–100)
PDW BLD-RTO: 12.2 % (ref 11.7–14.9)
PLATELET # BLD: 216 K/CU MM (ref 140–440)
PMV BLD AUTO: 11.9 FL (ref 7.5–11.1)
POTASSIUM SERPL-SCNC: 4.3 MMOL/L (ref 3.5–5.1)
RBC # BLD: 4.52 M/CU MM (ref 4.6–6.2)
SODIUM BLD-SCNC: 140 MMOL/L (ref 135–145)
TOTAL PROTEIN: 7.2 GM/DL (ref 6.4–8.2)
WBC # BLD: 7.1 K/CU MM (ref 4–10.5)

## 2024-03-12 PROCEDURE — 36415 COLL VENOUS BLD VENIPUNCTURE: CPT

## 2024-03-12 PROCEDURE — 85027 COMPLETE CBC AUTOMATED: CPT

## 2024-03-12 PROCEDURE — 86140 C-REACTIVE PROTEIN: CPT

## 2024-03-12 PROCEDURE — 80053 COMPREHEN METABOLIC PANEL: CPT

## 2024-03-12 PROCEDURE — 83993 ASSAY FOR CALPROTECTIN FECAL: CPT

## 2024-03-13 ENCOUNTER — HOSPITAL ENCOUNTER (OUTPATIENT)
Dept: INFUSION THERAPY | Age: 74
Setting detail: INFUSION SERIES
Discharge: HOME | End: 2024-03-13
Payer: MEDICARE

## 2024-03-13 VITALS
RESPIRATION RATE: 16 BRPM | DIASTOLIC BLOOD PRESSURE: 73 MMHG | SYSTOLIC BLOOD PRESSURE: 151 MMHG | OXYGEN SATURATION: 100 % | HEART RATE: 67 BPM | TEMPERATURE: 97.3 F

## 2024-03-13 DIAGNOSIS — K50.814 CROHN'S DISEASE OF BOTH SMALL AND LARGE INTESTINE WITH ABSCESS (HCC): Primary | ICD-10-CM

## 2024-03-13 PROCEDURE — 2580000003 HC RX 258: Performed by: SPECIALIST

## 2024-03-13 PROCEDURE — 96365 THER/PROPH/DIAG IV INF INIT: CPT

## 2024-03-13 PROCEDURE — 6360000002 HC RX W HCPCS: Performed by: SPECIALIST

## 2024-03-13 RX ORDER — SODIUM CHLORIDE 0.9 % (FLUSH) 0.9 %
5-40 SYRINGE (ML) INJECTION PRN
OUTPATIENT
Start: 2024-05-08

## 2024-03-13 RX ORDER — SODIUM CHLORIDE 0.9 % (FLUSH) 0.9 %
5-40 SYRINGE (ML) INJECTION PRN
Status: DISCONTINUED | OUTPATIENT
Start: 2024-03-13 | End: 2024-03-14 | Stop reason: HOSPADM

## 2024-03-13 RX ADMIN — VEDOLIZUMAB 300 MG: 300 INJECTION, POWDER, LYOPHILIZED, FOR SOLUTION INTRAVENOUS at 10:15

## 2024-03-13 RX ADMIN — SODIUM CHLORIDE, PRESERVATIVE FREE 30 ML: 5 INJECTION INTRAVENOUS at 10:53

## 2024-03-13 RX ADMIN — SODIUM CHLORIDE, PRESERVATIVE FREE 10 ML: 5 INJECTION INTRAVENOUS at 09:29

## 2024-03-16 LAB — CALPROTECTIN STL-MCNT: 310 UG/G

## 2024-04-25 ENCOUNTER — PREP FOR PROCEDURE (OUTPATIENT)
Dept: GASTROENTEROLOGY | Age: 74
End: 2024-04-25

## 2024-04-25 RX ORDER — SODIUM CHLORIDE 0.9 % (FLUSH) 0.9 %
5-40 SYRINGE (ML) INJECTION PRN
OUTPATIENT
Start: 2024-04-25

## 2024-04-25 RX ORDER — SODIUM CHLORIDE 0.9 % (FLUSH) 0.9 %
5-40 SYRINGE (ML) INJECTION EVERY 12 HOURS SCHEDULED
OUTPATIENT
Start: 2024-04-25

## 2024-04-25 RX ORDER — SODIUM CHLORIDE 9 MG/ML
25 INJECTION, SOLUTION INTRAVENOUS PRN
OUTPATIENT
Start: 2024-04-25

## 2024-05-08 ENCOUNTER — HOSPITAL ENCOUNTER (OUTPATIENT)
Dept: INFUSION THERAPY | Age: 74
Setting detail: INFUSION SERIES
Discharge: HOME | End: 2024-05-08
Payer: MEDICARE

## 2024-05-08 VITALS
HEART RATE: 55 BPM | SYSTOLIC BLOOD PRESSURE: 122 MMHG | DIASTOLIC BLOOD PRESSURE: 72 MMHG | RESPIRATION RATE: 16 BRPM | TEMPERATURE: 98 F | OXYGEN SATURATION: 97 %

## 2024-05-08 DIAGNOSIS — K50.814 CROHN'S DISEASE OF BOTH SMALL AND LARGE INTESTINE WITH ABSCESS (HCC): Primary | ICD-10-CM

## 2024-05-08 PROCEDURE — 6360000002 HC RX W HCPCS: Performed by: SPECIALIST

## 2024-05-08 PROCEDURE — 2580000003 HC RX 258: Performed by: SPECIALIST

## 2024-05-08 PROCEDURE — 96365 THER/PROPH/DIAG IV INF INIT: CPT

## 2024-05-08 RX ORDER — SODIUM CHLORIDE 0.9 % (FLUSH) 0.9 %
5-40 SYRINGE (ML) INJECTION PRN
Status: DISCONTINUED | OUTPATIENT
Start: 2024-05-08 | End: 2024-05-09 | Stop reason: HOSPADM

## 2024-05-08 RX ORDER — SODIUM CHLORIDE 0.9 % (FLUSH) 0.9 %
5-40 SYRINGE (ML) INJECTION PRN
OUTPATIENT
Start: 2024-07-03

## 2024-05-08 RX ADMIN — SODIUM CHLORIDE, PRESERVATIVE FREE 10 ML: 5 INJECTION INTRAVENOUS at 10:12

## 2024-05-08 RX ADMIN — SODIUM CHLORIDE, PRESERVATIVE FREE 10 ML: 5 INJECTION INTRAVENOUS at 09:31

## 2024-05-08 RX ADMIN — VEDOLIZUMAB 300 MG: 300 INJECTION, POWDER, LYOPHILIZED, FOR SOLUTION INTRAVENOUS at 09:32

## 2024-05-08 NOTE — PROGRESS NOTES
Pt tolerated well. Discharged instructions given to pt, pt voiced understanding.  Pt discharged via ambulatory by self to exit.

## 2024-05-08 NOTE — PROGRESS NOTES
Pt taken to room 05 for entyvio. Pt oriented to room, call light, bed/chair controls, TV, pt voiced understanding.  Plan of care explained to pt, pt voiced understanding.

## 2024-05-23 DIAGNOSIS — K50.814 CROHN'S DISEASE OF BOTH SMALL AND LARGE INTESTINE WITH ABSCESS (HCC): Primary | ICD-10-CM

## 2024-05-23 RX ORDER — DIPHENOXYLATE HYDROCHLORIDE AND ATROPINE SULFATE 2.5; .025 MG/1; MG/1
1 TABLET ORAL 4 TIMES DAILY
Qty: 360 TABLET | Refills: 3 | Status: SHIPPED | OUTPATIENT
Start: 2024-05-23 | End: 2025-05-18

## 2024-05-25 ENCOUNTER — HOSPITAL ENCOUNTER (EMERGENCY)
Age: 74
Discharge: HOME | End: 2024-05-25
Attending: EMERGENCY MEDICINE
Payer: MEDICARE

## 2024-05-25 ENCOUNTER — APPOINTMENT (OUTPATIENT)
Dept: GENERAL RADIOLOGY | Age: 74
End: 2024-05-25
Payer: MEDICARE

## 2024-05-25 VITALS
BODY MASS INDEX: 28.25 KG/M2 | TEMPERATURE: 98.2 F | WEIGHT: 180 LBS | HEIGHT: 67 IN | HEART RATE: 66 BPM | RESPIRATION RATE: 15 BRPM | DIASTOLIC BLOOD PRESSURE: 115 MMHG | OXYGEN SATURATION: 100 % | SYSTOLIC BLOOD PRESSURE: 127 MMHG

## 2024-05-25 DIAGNOSIS — S42.202A CLOSED FRACTURE OF PROXIMAL END OF LEFT HUMERUS, UNSPECIFIED FRACTURE MORPHOLOGY, INITIAL ENCOUNTER: Primary | ICD-10-CM

## 2024-05-25 LAB
ANION GAP SERPL CALCULATED.3IONS-SCNC: 11 MMOL/L (ref 7–16)
BASOPHILS ABSOLUTE: 0.1 K/CU MM
BASOPHILS RELATIVE PERCENT: 1 % (ref 0–1)
BUN SERPL-MCNC: 21 MG/DL (ref 6–23)
CALCIUM SERPL-MCNC: 8.8 MG/DL (ref 8.3–10.6)
CHLORIDE BLD-SCNC: 107 MMOL/L (ref 99–110)
CO2: 24 MMOL/L (ref 21–32)
CREAT SERPL-MCNC: 1.6 MG/DL (ref 0.9–1.3)
DIFFERENTIAL TYPE: ABNORMAL
EOSINOPHILS ABSOLUTE: 0.2 K/CU MM
EOSINOPHILS RELATIVE PERCENT: 2.7 % (ref 0–3)
GFR, ESTIMATED: 45 ML/MIN/1.73M2
GLUCOSE SERPL-MCNC: 129 MG/DL (ref 70–99)
HCT VFR BLD CALC: 38.7 % (ref 42–52)
HEMOGLOBIN: 13.2 GM/DL (ref 13.5–18)
IMMATURE NEUTROPHIL %: 0.3 % (ref 0–0.43)
LYMPHOCYTES ABSOLUTE: 1.3 K/CU MM
LYMPHOCYTES RELATIVE PERCENT: 17.3 % (ref 24–44)
MCH RBC QN AUTO: 33.6 PG (ref 27–31)
MCHC RBC AUTO-ENTMCNC: 34.1 % (ref 32–36)
MCV RBC AUTO: 98.5 FL (ref 78–100)
MONOCYTES ABSOLUTE: 0.6 K/CU MM
MONOCYTES RELATIVE PERCENT: 8.3 % (ref 0–4)
NEUTROPHILS ABSOLUTE: 5.5 K/CU MM
NEUTROPHILS RELATIVE PERCENT: 70.4 % (ref 36–66)
NUCLEATED RBC %: 0 %
PDW BLD-RTO: 12.6 % (ref 11.7–14.9)
PLATELET # BLD: 175 K/CU MM (ref 140–440)
PMV BLD AUTO: 11.1 FL (ref 7.5–11.1)
POTASSIUM SERPL-SCNC: 3.6 MMOL/L (ref 3.5–5.1)
RBC # BLD: 3.93 M/CU MM (ref 4.6–6.2)
SODIUM BLD-SCNC: 142 MMOL/L (ref 135–145)
TOTAL IMMATURE NEUTOROPHIL: 0.02 K/CU MM
TOTAL NUCLEATED RBC: 0 K/CU MM
WBC # BLD: 7.8 K/CU MM (ref 4–10.5)

## 2024-05-25 PROCEDURE — 6360000002 HC RX W HCPCS: Performed by: NURSE PRACTITIONER

## 2024-05-25 PROCEDURE — 96375 TX/PRO/DX INJ NEW DRUG ADDON: CPT

## 2024-05-25 PROCEDURE — 85025 COMPLETE CBC W/AUTO DIFF WBC: CPT

## 2024-05-25 PROCEDURE — 96376 TX/PRO/DX INJ SAME DRUG ADON: CPT

## 2024-05-25 PROCEDURE — 99284 EMERGENCY DEPT VISIT MOD MDM: CPT

## 2024-05-25 PROCEDURE — 96374 THER/PROPH/DIAG INJ IV PUSH: CPT

## 2024-05-25 PROCEDURE — 80048 BASIC METABOLIC PNL TOTAL CA: CPT

## 2024-05-25 PROCEDURE — 73030 X-RAY EXAM OF SHOULDER: CPT

## 2024-05-25 RX ORDER — MORPHINE SULFATE 4 MG/ML
4 INJECTION, SOLUTION INTRAMUSCULAR; INTRAVENOUS ONCE
Status: COMPLETED | OUTPATIENT
Start: 2024-05-25 | End: 2024-05-25

## 2024-05-25 RX ORDER — HYDROCODONE BITARTRATE AND ACETAMINOPHEN 5; 325 MG/1; MG/1
1 TABLET ORAL EVERY 6 HOURS PRN
Qty: 10 TABLET | Refills: 0 | Status: SHIPPED | OUTPATIENT
Start: 2024-05-25 | End: 2024-05-28

## 2024-05-25 RX ADMIN — MORPHINE SULFATE 4 MG: 4 INJECTION, SOLUTION INTRAMUSCULAR; INTRAVENOUS at 14:07

## 2024-05-25 RX ADMIN — HYDROMORPHONE HYDROCHLORIDE 0.25 MG: 1 INJECTION, SOLUTION INTRAMUSCULAR; INTRAVENOUS; SUBCUTANEOUS at 16:26

## 2024-05-25 RX ADMIN — HYDROMORPHONE HYDROCHLORIDE 0.25 MG: 1 INJECTION, SOLUTION INTRAMUSCULAR; INTRAVENOUS; SUBCUTANEOUS at 14:42

## 2024-05-25 ASSESSMENT — PAIN SCALES - GENERAL
PAINLEVEL_OUTOF10: 9
PAINLEVEL_OUTOF10: 10
PAINLEVEL_OUTOF10: 9

## 2024-05-25 ASSESSMENT — PAIN DESCRIPTION - LOCATION
LOCATION: SHOULDER
LOCATION: ARM;SHOULDER
LOCATION: SHOULDER;ARM

## 2024-05-25 ASSESSMENT — PAIN DESCRIPTION - DESCRIPTORS: DESCRIPTORS: ACHING;SHARP

## 2024-05-25 ASSESSMENT — PAIN DESCRIPTION - ORIENTATION
ORIENTATION: LEFT

## 2024-05-25 ASSESSMENT — PAIN - FUNCTIONAL ASSESSMENT: PAIN_FUNCTIONAL_ASSESSMENT: 0-10

## 2024-05-25 ASSESSMENT — PAIN DESCRIPTION - PAIN TYPE: TYPE: ACUTE PAIN

## 2024-05-25 NOTE — ED PROVIDER NOTES
[facility] EMERGENCY DEPARTMENT  EMERGENCY DEPARTMENT ENCOUNTER      Pt Name: Tuan Lea  MRN: [id number]  Birthdate 1950  Date of evaluation: 5/25/2024  Provider: GUMARO Corona - CNP  PCP: Radha Fournier APRN - NP  Note Started: 1:53 PM EDT 5/25/24    I am the Primary Clinician of Record.   I have seen and evaluated this patient with my supervising physician No att. providers found.  CHIEF COMPLAINT       Chief Complaint   Patient presents with    Shoulder Pain     L      HISTORY OF PRESENT ILLNESS: 1 or more Elements   History from : Patient    Tuan Lea is a 73 y.o. male who presents to the ER with chief complaint of left shoulder pain secondary to fall.  He was working in his garage. He stood on a bucket to reach something and shelf and ultimately fell onto his left shoulder.  Denies striking his head, no loc, no blood thinners.     I have reviewed the nursing triage documentation and agree unless otherwise noted.    REVIEW OF SYSTEMS :    Review of Systems   Musculoskeletal:  Positive for arthralgias (left shoulder).     Positives and Pertinent negatives as per HPI.   SURGICAL HISTORY     Past Surgical History:   Procedure Laterality Date    ABDOMEN SURGERY      bowel obstruction times three    COLONOSCOPY      DILATATION, ESOPHAGUS      ENDOSCOPY, COLON, DIAGNOSTIC      ERCP Bilateral 6/26/2023    ERCP SPHINCTER/PAPILLOTOMY WITH  A SWEEP OF CBD AND REMOVAL OF  SLUDGE performed by Alexey Higginbotham MD at [facility]    EYE SURGERY      cataract    PROCTOSIGMOIDOSCOPY N/A 7/5/2023    ANAL PROCTO SIGMOIDOSCOPY FLEXIBLE performed by Navi Chaves MD at [facility] ENDOSCOPY    SIGMOIDOSCOPY N/A 07/12/2022    SIGMOIDOSCOPY BIOPSY FLEXIBLE performed by Navi Chaves MD at [facility] OR    SMALL INTESTINE SURGERY  03/09/2016    [facility]: Hand-sewn closure of loop ileostomy    SMALL INTESTINE SURGERY  12/15/2015    [place]  IntraVENous Given 5/25/24 1626)       ED Course as of 05/25/24 1652   Sat May 25, 2024   1437 IMPRESSION:  1. Fracture through the proximal humerus  2. The glenohumeral joint appears located  3. 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 Valdez, orthopedic surgery who recommends sling and close outpatient follow-up.      Splint Application:  A sling was applied by the ED staff under my supervision.  After application, I evaluated splint and deemed the affected area to be satisfactorily immobilized.  There is good alignment and stabilization.  Patient's distal neurovascular exam remains unchanged status post splint application.  There is no evidence of compartment syndrome.      The patient however is concerned that he lives by himself and has no one to help him at home.  He is requesting hospitalization until he can see orthopedics.  Will ambulate the patient in the emergency department to see how he tolerates.  Case management consult placed to help with home care.    Patient is comfortable and without complaint.  Patient given prescriptions for pain control and to follow up with orthopedics in 3-4 days.     Patient’s care significantly limited by social determinants of health including:  na    Disposition Considerations (tests considered but not done, Shared Decision Making, Pt Expectation of Test or Tx.):   Admission Considered. Shared decision making employed.  Case management has been consulted for resources to help patient go home. Appropriate for outpatient management.Strict ED return guidelines reviewed.  Patient discharge in  stable condition.      FINAL IMPRESSION      1. Closed fracture of proximal end of left humerus, unspecified fracture morphology, initial

## 2024-05-25 NOTE — ED TRIAGE NOTES
Pt arrives from home with complaint of L shoulder pain. Pt was standing on a bucket in his garage and fell from aprox. 5 feet onto concrete floor on L shoulder. Pt has sensation along arm and in L hand. Pt rates pain 9/10

## 2024-05-25 NOTE — ED PROVIDER NOTES
THIS IS MY ZE SUPERVISORY AND SHARED VISIT NOTE    I independently examined and evaluated Tuan Lea.    In brief, the patient had a mechanical fall in the garage after falling off some buckets where he was standing trying to grab something, landing on his left shoulder..    Focused exam revealed left shoulder in a sling with left proximal humerus tenderness, distal radial pulse +2, distal sensation intact.        CC/HPI Summary, DDx, ED Course, and Reassessment:   73-year-old male with mechanical fall who has a left shoulder tenderness.  X-ray shows a proximal humerus fracture.                Patient was given the following medications:  Medications   morphine sulfate (PF) injection 4 mg (4 mg IntraVENous Given 5/25/24 1407)       Independent Imaging Interpretation by me:   I independently interpreted the left shoulder xray which showed proximal humerus fracture        Disposition Considerations (tests considered but not done, Shared Decision Making, Pt Expectation of Test or Tx.):     ED Course as of 05/25/24 1455   Sat May 25, 2024   1437 IMPRESSION:  1. Fracture through the proximal humerus  2. The glenohumeral joint appears located  3. 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      Discharge condition: stable    I am the Primary Clinician of Record.    Is this patient to be included in the SEP-1 Core Measure due to severe sepsis or septic shock?   No   Exclusion criteria - the patient is NOT to be included for SEP-1 Core Measure due to:  Infection is not suspected      All diagnostic, treatment, and disposition decisions were made by myself in

## 2024-05-25 NOTE — ED NOTES
Pt up and ambulated to the restroom and back to a room, pt stated has some dizziness at times, pt back in bed, call light in reach, will continue to monitor.

## 2024-05-25 NOTE — CARE COORDINATION
CM received a consult on patient. Patient in possible need of HHC. CM in to see patient. CM introduced self and explained job role. Patient stated that he was trying to hang something up in his garage today and was standing on 2 buckets and they gave away and patient fell. Patient ended up with a fractured right arm. Patient is right handed. Patient lives alone and is independent and is able to walk without the use of cane or walker. Patient is concerned about going home alone b/c he doesn't know if he will be able to balance self very well and how bad his pain will be. CM discussed setting up Home Health Care for patient, but patient states that he prefers to go see the ortho doctor first and then let the ortho doctor make that decision. CM provided patient with Home Health Care Agencies in the area. RN walked patient and patient was able to walk well and was able to stay balanced.     Patient reported that he has a friend that will pick him up upon discharge and can help patient get into his house and help him get set up for the evening. Patient will call his family physician on Tuesday and let him know if he is need of pain medication. Patient will be sent home with a prescription for pain medication for the next few days. Patient will then call and follow up with the ortho physician. Patient has another friend that can assist him with transportation to and from doctor appointments.     CM updated Dr. Cabrera and GUMARO Corona CNP. Patient was told that it does not appear that he will need surgery and to call ortho physician as needed. CM explained that patient wants to see ortho physician and get his opinion on HHC/rehab.

## 2024-05-28 ENCOUNTER — TELEPHONE (OUTPATIENT)
Dept: GASTROENTEROLOGY | Age: 74
End: 2024-05-28

## 2024-05-28 NOTE — TELEPHONE ENCOUNTER
Patient called in needing to reschedule flex sig due to breaking his arm. Patient rescheduled to 8/7/24 at 2pm. Patient denied needing a new packet since the prep time wont change, just the date. Message sent to procedure schedulers at the hospital regarding the change.

## 2024-05-29 ENCOUNTER — OFFICE VISIT (OUTPATIENT)
Dept: ORTHOPEDIC SURGERY | Age: 74
End: 2024-05-29
Payer: MEDICARE

## 2024-05-29 VITALS
SYSTOLIC BLOOD PRESSURE: 152 MMHG | DIASTOLIC BLOOD PRESSURE: 93 MMHG | RESPIRATION RATE: 17 BRPM | BODY MASS INDEX: 28.19 KG/M2 | HEIGHT: 67 IN | HEART RATE: 75 BPM

## 2024-05-29 DIAGNOSIS — S42.202A CLOSED FRACTURE OF PROXIMAL END OF LEFT HUMERUS, UNSPECIFIED FRACTURE MORPHOLOGY, INITIAL ENCOUNTER: Primary | ICD-10-CM

## 2024-05-29 PROCEDURE — 1036F TOBACCO NON-USER: CPT | Performed by: ORTHOPAEDIC SURGERY

## 2024-05-29 PROCEDURE — 3080F DIAST BP >= 90 MM HG: CPT | Performed by: ORTHOPAEDIC SURGERY

## 2024-05-29 PROCEDURE — 1124F ACP DISCUSS-NO DSCNMKR DOCD: CPT | Performed by: ORTHOPAEDIC SURGERY

## 2024-05-29 PROCEDURE — 3077F SYST BP >= 140 MM HG: CPT | Performed by: ORTHOPAEDIC SURGERY

## 2024-05-29 PROCEDURE — G8417 CALC BMI ABV UP PARAM F/U: HCPCS | Performed by: ORTHOPAEDIC SURGERY

## 2024-05-29 PROCEDURE — G8427 DOCREV CUR MEDS BY ELIG CLIN: HCPCS | Performed by: ORTHOPAEDIC SURGERY

## 2024-05-29 PROCEDURE — 3017F COLORECTAL CA SCREEN DOC REV: CPT | Performed by: ORTHOPAEDIC SURGERY

## 2024-05-29 PROCEDURE — 99204 OFFICE O/P NEW MOD 45 MIN: CPT | Performed by: ORTHOPAEDIC SURGERY

## 2024-05-29 RX ORDER — CYCLOBENZAPRINE HCL 10 MG
10 TABLET ORAL 3 TIMES DAILY PRN
Qty: 30 TABLET | Refills: 0 | Status: SHIPPED | OUTPATIENT
Start: 2024-05-29 | End: 2024-06-08

## 2024-05-29 RX ORDER — HYDROCODONE BITARTRATE AND ACETAMINOPHEN 5; 325 MG/1; MG/1
1 TABLET ORAL EVERY 6 HOURS PRN
Qty: 28 TABLET | Refills: 0 | Status: SHIPPED | OUTPATIENT
Start: 2024-05-29 | End: 2024-06-05

## 2024-05-29 ASSESSMENT — ENCOUNTER SYMPTOMS
SHORTNESS OF BREATH: 0
COLOR CHANGE: 0
EYE REDNESS: 0
ABDOMINAL PAIN: 0

## 2024-05-29 NOTE — PROGRESS NOTES
Subjective   Patient ID: Tuan Lea is a 73 y.o. male.      He comes in today for his first visit with me in regards to his left shoulder injury.  He states that he was climbing on some chairs and some buckets trying to work on something when he felt landing directly onto his left side.  He had immediate pain after the injury.  He went to the ED, x-rays were obtained and he was diagnosed with a left proximal humerus fracture.  He was placed into a sling and subsequently comes in today for his first visit.    He is continuing to complain of deep, aching and throbbing pain globally in the left shoulder.  Patient denies any prior history of pain or injury to the involved extremity/ joint, denies numbness or tingling in the involved extremity and denies fever or chills.  The patient denies any loss of consciousness and states that this was simply a mechanical fall.          Review of Systems   Constitutional:  Negative for activity change, chills and fever.   HENT:  Negative for congestion and drooling.    Eyes:  Negative for redness.   Respiratory:  Negative for shortness of breath.    Cardiovascular:  Negative for chest pain.   Gastrointestinal:  Negative for abdominal pain.   Endocrine: Negative for cold intolerance and heat intolerance.   Musculoskeletal:  Positive for arthralgias and myalgias. Negative for gait problem and joint swelling.   Skin:  Negative for color change, pallor, rash and wound.   Neurological:  Negative for weakness and numbness.   Psychiatric/Behavioral:  Negative for confusion.      Past Medical History:   Diagnosis Date    Acute renal failure with tubular necrosis (HCC) 07/05/2021    Anxiety, generalized     Crohn's disease (HCC)     Crohn's disease of small and large intestines with complication (HCC) 01/05/2021    Crohn's disease without complication (HCC) 08/27/2020    Depression     GERD (gastroesophageal reflux disease)     Hepatitis     hepatitis when a child    High

## 2024-05-29 NOTE — PATIENT INSTRUCTIONS
Continue weight-bearing as tolerated.  Continue range of motion exercises as instructed.  Ice and elevate as needed.  Tylenol or Motrin for pain.  Pain medication prescribed.  New sling given.  Follow up on 6/10 with repeat x-rays.    We are committed to providing you the best care possible.     If you receive a survey after visiting one of our offices, please take time to share your experience concerning your physician office visit.  These surveys are confidential and no health information about you is shared.     We are eager to improve for you and we are counting on your feedback to help make that happen.

## 2024-05-30 ENCOUNTER — TELEPHONE (OUTPATIENT)
Dept: ORTHOPEDIC SURGERY | Age: 74
End: 2024-05-30

## 2024-05-30 NOTE — TELEPHONE ENCOUNTER
JENNYOVM for [facility] for patient prescription Flexeril.    Provider name, NPI, and medication dosage and quantity left on voicemail.

## 2024-06-10 ENCOUNTER — TELEPHONE (OUTPATIENT)
Dept: ORTHOPEDIC SURGERY | Age: 74
End: 2024-06-10

## 2024-06-10 ENCOUNTER — OFFICE VISIT (OUTPATIENT)
Dept: ORTHOPEDIC SURGERY | Age: 74
End: 2024-06-10
Payer: MEDICARE

## 2024-06-10 VITALS
OXYGEN SATURATION: 97 % | WEIGHT: 180 LBS | RESPIRATION RATE: 14 BRPM | BODY MASS INDEX: 28.25 KG/M2 | HEIGHT: 67 IN | HEART RATE: 76 BPM

## 2024-06-10 DIAGNOSIS — S42.202D CLOSED FRACTURE OF PROXIMAL END OF LEFT HUMERUS WITH ROUTINE HEALING, UNSPECIFIED FRACTURE MORPHOLOGY, SUBSEQUENT ENCOUNTER: Primary | ICD-10-CM

## 2024-06-10 PROBLEM — S42.222A CLOSED 2-PART DISPLACED FRACTURE OF SURGICAL NECK OF LEFT HUMERUS: Status: ACTIVE | Noted: 2024-04-25

## 2024-06-10 PROCEDURE — 1124F ACP DISCUSS-NO DSCNMKR DOCD: CPT | Performed by: ORTHOPAEDIC SURGERY

## 2024-06-10 PROCEDURE — 3017F COLORECTAL CA SCREEN DOC REV: CPT | Performed by: ORTHOPAEDIC SURGERY

## 2024-06-10 PROCEDURE — 1036F TOBACCO NON-USER: CPT | Performed by: ORTHOPAEDIC SURGERY

## 2024-06-10 PROCEDURE — G8427 DOCREV CUR MEDS BY ELIG CLIN: HCPCS | Performed by: ORTHOPAEDIC SURGERY

## 2024-06-10 PROCEDURE — G8417 CALC BMI ABV UP PARAM F/U: HCPCS | Performed by: ORTHOPAEDIC SURGERY

## 2024-06-10 PROCEDURE — 99213 OFFICE O/P EST LOW 20 MIN: CPT | Performed by: ORTHOPAEDIC SURGERY

## 2024-06-10 RX ORDER — SODIUM CHLORIDE 0.9 % (FLUSH) 0.9 %
5-40 SYRINGE (ML) INJECTION EVERY 12 HOURS SCHEDULED
Status: CANCELLED | OUTPATIENT
Start: 2024-06-10

## 2024-06-10 RX ORDER — ACETAMINOPHEN 325 MG/1
1000 TABLET ORAL ONCE
Status: CANCELLED | OUTPATIENT
Start: 2024-06-10 | End: 2024-06-10

## 2024-06-10 RX ORDER — SODIUM CHLORIDE 9 MG/ML
INJECTION, SOLUTION INTRAVENOUS PRN
Status: CANCELLED | OUTPATIENT
Start: 2024-06-10

## 2024-06-10 RX ORDER — SODIUM CHLORIDE, SODIUM LACTATE, POTASSIUM CHLORIDE, CALCIUM CHLORIDE 600; 310; 30; 20 MG/100ML; MG/100ML; MG/100ML; MG/100ML
INJECTION, SOLUTION INTRAVENOUS CONTINUOUS
Status: CANCELLED | OUTPATIENT
Start: 2024-06-10

## 2024-06-10 RX ORDER — HYDROCODONE BITARTRATE AND ACETAMINOPHEN 5; 325 MG/1; MG/1
1 TABLET ORAL EVERY 6 HOURS PRN
COMMUNITY

## 2024-06-10 RX ORDER — CYCLOBENZAPRINE HCL 10 MG
10 TABLET ORAL 3 TIMES DAILY PRN
COMMUNITY

## 2024-06-10 RX ORDER — SODIUM CHLORIDE 0.9 % (FLUSH) 0.9 %
5-40 SYRINGE (ML) INJECTION PRN
Status: CANCELLED | OUTPATIENT
Start: 2024-06-10

## 2024-06-10 ASSESSMENT — ENCOUNTER SYMPTOMS
EYE REDNESS: 0
COLOR CHANGE: 0
SHORTNESS OF BREATH: 0
ABDOMINAL PAIN: 0

## 2024-06-10 NOTE — PROGRESS NOTES
Subjective   Patient ID: Tuan Lea is a 73 y.o. male.      He comes in today for recheck of his left shoulder injury.  He states that since I saw him last the pain in his left shoulder has been improving but he is kept his sling on and has not been using his left arm.  He is continuing to complain of deep, aching and throbbing pain globally in the left shoulder.  Patient denies any prior history of pain or injury to the involved extremity/ joint, denies numbness or tingling in the involved extremity and denies fever or chills.  The patient denies any loss of consciousness and states that this was simply a mechanical fall.          Review of Systems   Constitutional:  Negative for activity change, chills and fever.   HENT:  Negative for congestion and drooling.    Eyes:  Negative for redness.   Respiratory:  Negative for shortness of breath.    Cardiovascular:  Negative for chest pain.   Gastrointestinal:  Negative for abdominal pain.   Endocrine: Negative for cold intolerance and heat intolerance.   Musculoskeletal:  Positive for arthralgias and myalgias. Negative for gait problem and joint swelling.   Skin:  Negative for color change, pallor, rash and wound.   Neurological:  Negative for weakness and numbness.   Psychiatric/Behavioral:  Negative for confusion.      Past Medical History:   Diagnosis Date    Acute renal failure with tubular necrosis (HCC) 07/05/2021    Anxiety, generalized     Crohn's disease (HCC)     Crohn's disease of small and large intestines with complication (HCC) 01/05/2021    Crohn's disease without complication (HCC) 08/27/2020    Depression     GERD (gastroesophageal reflux disease)     Hepatitis     hepatitis when a child    High cholesterol     Hx of echocardiogram 06/22/2022    No significant valvular disease noted. Dilation of ascending aorta(3.8cm) . Normal diastolic filling pattern for age.    Liver disease     Primary hypertension 8/18/2022    Psychiatric problem

## 2024-06-10 NOTE — TELEPHONE ENCOUNTER
Patient scheduled for    Open Reduction and Internal Fixation of Left Proximal Humerus Fracture  Date: 6/12/24  Facility: [facility]  Surgeon: Alexey Sears DO  Product: Brandt, C-arm    Prep for Proc 6/10/24  PCP Clearance routed via DrAvailable to GAYATRI Diaz 6/10/24  Pre Op Appt 6/10/24    Medicare Part A&B Insurance  Status: No Prior Auth Required for CPT 81210 ICD S42.222A for 23hr observation    Phone PAT

## 2024-06-10 NOTE — PATIENT INSTRUCTIONS
We will schedule surgery at soonest convenience. If you have any questions regarding your surgery please call our office and ask to speak with Mariia [phone].    We are committed to providing you the best care possible.  If you receive a survey after visiting one of our offices, please take time to share your experience concerning your physician office visit.  These surveys are confidential and no health information about you is shared.  We are eager to improve for you and we are counting on your feedback to help make that happen.

## 2024-06-10 NOTE — PROGRESS NOTES
.Surgery @ [facility] on 6/12/24 at 0730, arrival 0600    NOTHING TO EAT OR DRINK AFTER MIDNIGHT DAY OF SURGERY    1. Enter thru the hospital main entrance on day of surgery, check in at the Information Desk. If you arrive prior to 6:00am, enter thru the ER entrance.    2. Follow the directions as prescribed by the doctor for your procedure and medications.         Morning of surgery take:  Norvasc, Carvedilol & Nexium with sips of water         Stop vitamins, supplements and NSAIDS:  NOW    3. Check with your Doctor regarding stopping blood thinners and follow their instructions.    4. Do not smoke, vape or use chewing tobacco morning of surgery. Do not drink any alcoholic beverages 24 hours prior to surgery.       This includes NA Beer. No street drugs 7 days prior to surgery.    5. If you have dentures, contacts of glasses they will be removed before going to the OR; please bring a case.    6. Please bring picture ID, insurance card, paperwork from the doctor’s office (H & P, Consent, & card for implantable devices).    7. Take a shower with an antibacterial soap the night before surgery and the morning of surgery. Do not put anything on your skin      After your morning shower.    8. You will need a responsible adult to drive you home and check on you after surgery.

## 2024-06-11 ENCOUNTER — ANESTHESIA EVENT (OUTPATIENT)
Dept: OPERATING ROOM | Age: 74
End: 2024-06-11
Payer: MEDICARE

## 2024-06-11 ASSESSMENT — LIFESTYLE VARIABLES: SMOKING_STATUS: 1

## 2024-06-11 ASSESSMENT — ENCOUNTER SYMPTOMS: SHORTNESS OF BREATH: 1

## 2024-06-12 ENCOUNTER — APPOINTMENT (OUTPATIENT)
Dept: GENERAL RADIOLOGY | Age: 74
End: 2024-06-12
Attending: ORTHOPAEDIC SURGERY
Payer: MEDICARE

## 2024-06-12 ENCOUNTER — ANESTHESIA (OUTPATIENT)
Dept: OPERATING ROOM | Age: 74
End: 2024-06-12
Payer: MEDICARE

## 2024-06-12 ENCOUNTER — HOSPITAL ENCOUNTER (OUTPATIENT)
Age: 74
Discharge: HOME | End: 2024-06-14
Attending: ORTHOPAEDIC SURGERY | Admitting: ORTHOPAEDIC SURGERY
Payer: MEDICARE

## 2024-06-12 DIAGNOSIS — S42.222D CLOSED 2-PART DISPLACED FRACTURE OF SURGICAL NECK OF LEFT HUMERUS WITH ROUTINE HEALING, SUBSEQUENT ENCOUNTER: Primary | ICD-10-CM

## 2024-06-12 PROCEDURE — 6360000002 HC RX W HCPCS: Performed by: ORTHOPAEDIC SURGERY

## 2024-06-12 PROCEDURE — 23615 OPTX PROX HUMRL FX W/INT FIX: CPT | Performed by: ORTHOPAEDIC SURGERY

## 2024-06-12 PROCEDURE — 6370000000 HC RX 637 (ALT 250 FOR IP): Performed by: ORTHOPAEDIC SURGERY

## 2024-06-12 PROCEDURE — 3600000004 HC SURGERY LEVEL 4 BASE: Performed by: ORTHOPAEDIC SURGERY

## 2024-06-12 PROCEDURE — 3700000000 HC ANESTHESIA ATTENDED CARE: Performed by: ORTHOPAEDIC SURGERY

## 2024-06-12 PROCEDURE — 6360000002 HC RX W HCPCS: Performed by: ANESTHESIOLOGY

## 2024-06-12 PROCEDURE — 7100000000 HC PACU RECOVERY - FIRST 15 MIN: Performed by: ORTHOPAEDIC SURGERY

## 2024-06-12 PROCEDURE — 7100000001 HC PACU RECOVERY - ADDTL 15 MIN: Performed by: ORTHOPAEDIC SURGERY

## 2024-06-12 PROCEDURE — 3700000001 HC ADD 15 MINUTES (ANESTHESIA): Performed by: ORTHOPAEDIC SURGERY

## 2024-06-12 PROCEDURE — C1713 ANCHOR/SCREW BN/BN,TIS/BN: HCPCS | Performed by: ORTHOPAEDIC SURGERY

## 2024-06-12 PROCEDURE — 6360000002 HC RX W HCPCS: Performed by: NURSE ANESTHETIST, CERTIFIED REGISTERED

## 2024-06-12 PROCEDURE — 3600000014 HC SURGERY LEVEL 4 ADDTL 15MIN: Performed by: ORTHOPAEDIC SURGERY

## 2024-06-12 PROCEDURE — 7100000011 HC PHASE II RECOVERY - ADDTL 15 MIN: Performed by: ORTHOPAEDIC SURGERY

## 2024-06-12 PROCEDURE — 64415 NJX AA&/STRD BRCH PLXS IMG: CPT | Performed by: ANESTHESIOLOGY

## 2024-06-12 PROCEDURE — 2580000003 HC RX 258: Performed by: ORTHOPAEDIC SURGERY

## 2024-06-12 PROCEDURE — 94761 N-INVAS EAR/PLS OXIMETRY MLT: CPT

## 2024-06-12 PROCEDURE — 2500000003 HC RX 250 WO HCPCS: Performed by: NURSE ANESTHETIST, CERTIFIED REGISTERED

## 2024-06-12 PROCEDURE — 2709999900 HC NON-CHARGEABLE SUPPLY: Performed by: ORTHOPAEDIC SURGERY

## 2024-06-12 PROCEDURE — 2500000003 HC RX 250 WO HCPCS: Performed by: ORTHOPAEDIC SURGERY

## 2024-06-12 PROCEDURE — 1200000000 HC SEMI PRIVATE

## 2024-06-12 PROCEDURE — 76000 FLUOROSCOPY <1 HR PHYS/QHP: CPT

## 2024-06-12 PROCEDURE — 7100000010 HC PHASE II RECOVERY - FIRST 15 MIN: Performed by: ORTHOPAEDIC SURGERY

## 2024-06-12 PROCEDURE — P9045 ALBUMIN (HUMAN), 5%, 250 ML: HCPCS | Performed by: NURSE ANESTHETIST, CERTIFIED REGISTERED

## 2024-06-12 DEVICE — PEG BNE FIX L40MM DIA3.2MM PROX HUM G LOK FOR ALPS PLATING: Type: IMPLANTABLE DEVICE | Site: SHOULDER | Status: FUNCTIONAL

## 2024-06-12 DEVICE — PEG BNE FIX L32MM DIA3.2MM PROX HUM G LOK FOR ALPS PLATING: Type: IMPLANTABLE DEVICE | Site: SHOULDER | Status: FUNCTIONAL

## 2024-06-12 DEVICE — PEG BNE FIX L54MM DIA3.2MM PROX HUM G LOK FOR ALPS PLATING: Type: IMPLANTABLE DEVICE | Site: SHOULDER | Status: FUNCTIONAL

## 2024-06-12 DEVICE — PEG BNE FIX L42MM DIA3.2MM PROX HUM G LOK FOR ALPS PLATING: Type: IMPLANTABLE DEVICE | Site: SHOULDER | Status: FUNCTIONAL

## 2024-06-12 DEVICE — SCREW BNE L32MM DIA3.5MM STD CORT DST TIB TI ST LOK FULL: Type: IMPLANTABLE DEVICE | Site: SHOULDER | Status: FUNCTIONAL

## 2024-06-12 DEVICE — SCREW BNE L30MM DIA3.5MM STD CORT DST TIB TI ST LOK FULL: Type: IMPLANTABLE DEVICE | Site: SHOULDER | Status: FUNCTIONAL

## 2024-06-12 DEVICE — SCREW BNE L30MM DIA3.5MM PROX HUM NONLOCKING T15 LO PROF: Type: IMPLANTABLE DEVICE | Site: SHOULDER | Status: FUNCTIONAL

## 2024-06-12 DEVICE — PEG BNE FIX L34MM DIA3.2MM PROX HUM G LOK FOR ALPS PLATING: Type: IMPLANTABLE DEVICE | Site: SHOULDER | Status: FUNCTIONAL

## 2024-06-12 DEVICE — PEG BNE FIX L38MM DIA3.2MM PROX HUM G LOK FOR ALPS PLATING: Type: IMPLANTABLE DEVICE | Site: SHOULDER | Status: FUNCTIONAL

## 2024-06-12 DEVICE — PEG BNE FIX L50MM DIA3.2MM PROX HUM G LOK FOR ALPS PLATING: Type: IMPLANTABLE DEVICE | Site: SHOULDER | Status: FUNCTIONAL

## 2024-06-12 DEVICE — IMPLANTABLE DEVICE: Type: IMPLANTABLE DEVICE | Site: SHOULDER | Status: FUNCTIONAL

## 2024-06-12 RX ORDER — ONDANSETRON 2 MG/ML
4 INJECTION INTRAMUSCULAR; INTRAVENOUS
Status: DISCONTINUED | OUTPATIENT
Start: 2024-06-12 | End: 2024-06-12

## 2024-06-12 RX ORDER — DROPERIDOL 2.5 MG/ML
0.62 INJECTION, SOLUTION INTRAMUSCULAR; INTRAVENOUS
Status: DISCONTINUED | OUTPATIENT
Start: 2024-06-12 | End: 2024-06-12

## 2024-06-12 RX ORDER — ROPIVACAINE HYDROCHLORIDE 5 MG/ML
INJECTION, SOLUTION EPIDURAL; INFILTRATION; PERINEURAL
Status: DISCONTINUED | OUTPATIENT
Start: 2024-06-12 | End: 2024-06-12 | Stop reason: SDUPTHER

## 2024-06-12 RX ORDER — PROPOFOL 10 MG/ML
INJECTION, EMULSION INTRAVENOUS PRN
Status: DISCONTINUED | OUTPATIENT
Start: 2024-06-12 | End: 2024-06-12 | Stop reason: SDUPTHER

## 2024-06-12 RX ORDER — FENTANYL CITRATE 50 UG/ML
INJECTION, SOLUTION INTRAMUSCULAR; INTRAVENOUS PRN
Status: DISCONTINUED | OUTPATIENT
Start: 2024-06-12 | End: 2024-06-12 | Stop reason: SDUPTHER

## 2024-06-12 RX ORDER — FENTANYL CITRATE 50 UG/ML
25 INJECTION, SOLUTION INTRAMUSCULAR; INTRAVENOUS EVERY 5 MIN PRN
Status: DISCONTINUED | OUTPATIENT
Start: 2024-06-12 | End: 2024-06-12

## 2024-06-12 RX ORDER — SODIUM CHLORIDE 9 MG/ML
INJECTION, SOLUTION INTRAVENOUS PRN
Status: DISCONTINUED | OUTPATIENT
Start: 2024-06-12 | End: 2024-06-12

## 2024-06-12 RX ORDER — OXYCODONE HYDROCHLORIDE 5 MG/1
5 TABLET ORAL EVERY 4 HOURS PRN
Status: DISCONTINUED | OUTPATIENT
Start: 2024-06-12 | End: 2024-06-14 | Stop reason: HOSPADM

## 2024-06-12 RX ORDER — SODIUM CHLORIDE, SODIUM LACTATE, POTASSIUM CHLORIDE, CALCIUM CHLORIDE 600; 310; 30; 20 MG/100ML; MG/100ML; MG/100ML; MG/100ML
INJECTION, SOLUTION INTRAVENOUS CONTINUOUS
Status: DISCONTINUED | OUTPATIENT
Start: 2024-06-12 | End: 2024-06-12 | Stop reason: HOSPADM

## 2024-06-12 RX ORDER — TRANEXAMIC ACID 10 MG/ML
1000 INJECTION, SOLUTION INTRAVENOUS
Status: COMPLETED | OUTPATIENT
Start: 2024-06-12 | End: 2024-06-12

## 2024-06-12 RX ORDER — SODIUM CHLORIDE 9 MG/ML
INJECTION, SOLUTION INTRAVENOUS PRN
Status: DISCONTINUED | OUTPATIENT
Start: 2024-06-12 | End: 2024-06-14 | Stop reason: HOSPADM

## 2024-06-12 RX ORDER — SODIUM CHLORIDE 0.9 % (FLUSH) 0.9 %
5-40 SYRINGE (ML) INJECTION PRN
Status: DISCONTINUED | OUTPATIENT
Start: 2024-06-12 | End: 2024-06-12

## 2024-06-12 RX ORDER — NALOXONE HYDROCHLORIDE 0.4 MG/ML
INJECTION, SOLUTION INTRAMUSCULAR; INTRAVENOUS; SUBCUTANEOUS PRN
Status: DISCONTINUED | OUTPATIENT
Start: 2024-06-12 | End: 2024-06-12

## 2024-06-12 RX ORDER — MIDAZOLAM HYDROCHLORIDE 1 MG/ML
INJECTION INTRAMUSCULAR; INTRAVENOUS
Status: COMPLETED
Start: 2024-06-12 | End: 2024-06-12

## 2024-06-12 RX ORDER — SODIUM CHLORIDE 0.9 % (FLUSH) 0.9 %
5-40 SYRINGE (ML) INJECTION EVERY 12 HOURS SCHEDULED
Status: DISCONTINUED | OUTPATIENT
Start: 2024-06-12 | End: 2024-06-12 | Stop reason: HOSPADM

## 2024-06-12 RX ORDER — SODIUM CHLORIDE, SODIUM LACTATE, POTASSIUM CHLORIDE, CALCIUM CHLORIDE 600; 310; 30; 20 MG/100ML; MG/100ML; MG/100ML; MG/100ML
INJECTION, SOLUTION INTRAVENOUS CONTINUOUS
Status: DISCONTINUED | OUTPATIENT
Start: 2024-06-12 | End: 2024-06-14 | Stop reason: HOSPADM

## 2024-06-12 RX ORDER — ACETAMINOPHEN 500 MG
1000 TABLET ORAL ONCE
Status: DISCONTINUED | OUTPATIENT
Start: 2024-06-12 | End: 2024-06-12 | Stop reason: HOSPADM

## 2024-06-12 RX ORDER — SODIUM CHLORIDE 0.9 % (FLUSH) 0.9 %
5-40 SYRINGE (ML) INJECTION EVERY 12 HOURS SCHEDULED
Status: DISCONTINUED | OUTPATIENT
Start: 2024-06-12 | End: 2024-06-14 | Stop reason: HOSPADM

## 2024-06-12 RX ORDER — SODIUM CHLORIDE 0.9 % (FLUSH) 0.9 %
5-40 SYRINGE (ML) INJECTION PRN
Status: DISCONTINUED | OUTPATIENT
Start: 2024-06-12 | End: 2024-06-12 | Stop reason: HOSPADM

## 2024-06-12 RX ORDER — SODIUM CHLORIDE 9 MG/ML
INJECTION, SOLUTION INTRAVENOUS PRN
Status: DISCONTINUED | OUTPATIENT
Start: 2024-06-12 | End: 2024-06-12 | Stop reason: HOSPADM

## 2024-06-12 RX ORDER — MIDAZOLAM HYDROCHLORIDE 1 MG/ML
INJECTION INTRAMUSCULAR; INTRAVENOUS PRN
Status: DISCONTINUED | OUTPATIENT
Start: 2024-06-12 | End: 2024-06-12 | Stop reason: SDUPTHER

## 2024-06-12 RX ORDER — OXYCODONE HYDROCHLORIDE 10 MG/1
10 TABLET ORAL EVERY 4 HOURS PRN
Status: DISCONTINUED | OUTPATIENT
Start: 2024-06-12 | End: 2024-06-14 | Stop reason: HOSPADM

## 2024-06-12 RX ORDER — DEXAMETHASONE SODIUM PHOSPHATE 4 MG/ML
INJECTION, SOLUTION INTRA-ARTICULAR; INTRALESIONAL; INTRAMUSCULAR; INTRAVENOUS; SOFT TISSUE PRN
Status: DISCONTINUED | OUTPATIENT
Start: 2024-06-12 | End: 2024-06-12 | Stop reason: SDUPTHER

## 2024-06-12 RX ORDER — SODIUM CHLORIDE 0.9 % (FLUSH) 0.9 %
5-40 SYRINGE (ML) INJECTION EVERY 12 HOURS SCHEDULED
Status: DISCONTINUED | OUTPATIENT
Start: 2024-06-12 | End: 2024-06-12

## 2024-06-12 RX ORDER — ALBUMIN, HUMAN INJ 5% 5 %
SOLUTION INTRAVENOUS PRN
Status: DISCONTINUED | OUTPATIENT
Start: 2024-06-12 | End: 2024-06-12 | Stop reason: SDUPTHER

## 2024-06-12 RX ORDER — ROPIVACAINE HYDROCHLORIDE 5 MG/ML
INJECTION, SOLUTION EPIDURAL; INFILTRATION; PERINEURAL
Status: DISPENSED
Start: 2024-06-12 | End: 2024-06-12

## 2024-06-12 RX ORDER — ONDANSETRON 4 MG/1
4 TABLET, ORALLY DISINTEGRATING ORAL EVERY 8 HOURS PRN
Status: DISCONTINUED | OUTPATIENT
Start: 2024-06-12 | End: 2024-06-14 | Stop reason: HOSPADM

## 2024-06-12 RX ORDER — SODIUM CHLORIDE 0.9 % (FLUSH) 0.9 %
5-40 SYRINGE (ML) INJECTION PRN
Status: DISCONTINUED | OUTPATIENT
Start: 2024-06-12 | End: 2024-06-14 | Stop reason: HOSPADM

## 2024-06-12 RX ORDER — ONDANSETRON 2 MG/ML
INJECTION INTRAMUSCULAR; INTRAVENOUS PRN
Status: DISCONTINUED | OUTPATIENT
Start: 2024-06-12 | End: 2024-06-12 | Stop reason: SDUPTHER

## 2024-06-12 RX ORDER — LIDOCAINE HYDROCHLORIDE 20 MG/ML
INJECTION, SOLUTION INTRAVENOUS PRN
Status: DISCONTINUED | OUTPATIENT
Start: 2024-06-12 | End: 2024-06-12 | Stop reason: SDUPTHER

## 2024-06-12 RX ORDER — ACETAMINOPHEN 500 MG
1000 TABLET ORAL ONCE
Status: COMPLETED | OUTPATIENT
Start: 2024-06-12 | End: 2024-06-12

## 2024-06-12 RX ORDER — ROCURONIUM BROMIDE 10 MG/ML
INJECTION, SOLUTION INTRAVENOUS PRN
Status: DISCONTINUED | OUTPATIENT
Start: 2024-06-12 | End: 2024-06-12 | Stop reason: SDUPTHER

## 2024-06-12 RX ORDER — LIDOCAINE HYDROCHLORIDE 10 MG/ML
INJECTION, SOLUTION EPIDURAL; INFILTRATION; INTRACAUDAL; PERINEURAL
Status: DISPENSED
Start: 2024-06-12 | End: 2024-06-12

## 2024-06-12 RX ORDER — ONDANSETRON 2 MG/ML
4 INJECTION INTRAMUSCULAR; INTRAVENOUS EVERY 6 HOURS PRN
Status: DISCONTINUED | OUTPATIENT
Start: 2024-06-12 | End: 2024-06-14 | Stop reason: HOSPADM

## 2024-06-12 RX ADMIN — SUGAMMADEX 50 MG: 100 INJECTION, SOLUTION INTRAVENOUS at 09:38

## 2024-06-12 RX ADMIN — FENTANYL CITRATE 100 MCG: 50 INJECTION, SOLUTION INTRAMUSCULAR; INTRAVENOUS at 08:00

## 2024-06-12 RX ADMIN — DEXAMETHASONE SODIUM PHOSPHATE 8 MG: 4 INJECTION, SOLUTION INTRAMUSCULAR; INTRAVENOUS at 08:03

## 2024-06-12 RX ADMIN — WATER 2000 MG: 1 INJECTION INTRAMUSCULAR; INTRAVENOUS; SUBCUTANEOUS at 17:57

## 2024-06-12 RX ADMIN — ROPIVACAINE HYDROCHLORIDE 20 ML: 5 INJECTION, SOLUTION EPIDURAL; INFILTRATION; PERINEURAL at 07:35

## 2024-06-12 RX ADMIN — ONDANSETRON 4 MG: 2 INJECTION INTRAMUSCULAR; INTRAVENOUS at 08:03

## 2024-06-12 RX ADMIN — SODIUM CHLORIDE, POTASSIUM CHLORIDE, SODIUM LACTATE AND CALCIUM CHLORIDE: 600; 310; 30; 20 INJECTION, SOLUTION INTRAVENOUS at 18:00

## 2024-06-12 RX ADMIN — LIDOCAINE HYDROCHLORIDE 50 MG: 20 INJECTION, SOLUTION INTRAVENOUS at 08:00

## 2024-06-12 RX ADMIN — SUGAMMADEX 150 MG: 100 INJECTION, SOLUTION INTRAVENOUS at 09:43

## 2024-06-12 RX ADMIN — ROCURONIUM BROMIDE 50 MG: 10 INJECTION INTRAVENOUS at 08:00

## 2024-06-12 RX ADMIN — ACETAMINOPHEN 1000 MG: 500 TABLET ORAL at 06:43

## 2024-06-12 RX ADMIN — CEFAZOLIN 2000 MG: 2 INJECTION, POWDER, FOR SOLUTION INTRAMUSCULAR; INTRAVENOUS at 08:03

## 2024-06-12 RX ADMIN — PROPOFOL 150 MG: 10 INJECTION, EMULSION INTRAVENOUS at 08:00

## 2024-06-12 RX ADMIN — OXYCODONE HYDROCHLORIDE 5 MG: 5 TABLET ORAL at 21:22

## 2024-06-12 RX ADMIN — WATER 2000 MG: 1 INJECTION INTRAMUSCULAR; INTRAVENOUS; SUBCUTANEOUS at 23:50

## 2024-06-12 RX ADMIN — SODIUM CHLORIDE, POTASSIUM CHLORIDE, SODIUM LACTATE AND CALCIUM CHLORIDE: 600; 310; 30; 20 INJECTION, SOLUTION INTRAVENOUS at 07:05

## 2024-06-12 RX ADMIN — MIDAZOLAM 2 MG: 1 INJECTION INTRAMUSCULAR; INTRAVENOUS at 07:36

## 2024-06-12 RX ADMIN — TRANEXAMIC ACID 1000 MG: 10 INJECTION, SOLUTION INTRAVENOUS at 08:00

## 2024-06-12 RX ADMIN — ALBUMIN (HUMAN) 12.5 G: 12.5 INJECTION, SOLUTION INTRAVENOUS at 08:28

## 2024-06-12 RX ADMIN — SODIUM CHLORIDE, POTASSIUM CHLORIDE, SODIUM LACTATE AND CALCIUM CHLORIDE: 600; 310; 30; 20 INJECTION, SOLUTION INTRAVENOUS at 10:21

## 2024-06-12 ASSESSMENT — PAIN - FUNCTIONAL ASSESSMENT
PAIN_FUNCTIONAL_ASSESSMENT: 0-10
PAIN_FUNCTIONAL_ASSESSMENT: PREVENTS OR INTERFERES SOME ACTIVE ACTIVITIES AND ADLS
PAIN_FUNCTIONAL_ASSESSMENT: 0-10
PAIN_FUNCTIONAL_ASSESSMENT: PREVENTS OR INTERFERES SOME ACTIVE ACTIVITIES AND ADLS
PAIN_FUNCTIONAL_ASSESSMENT: 0-10

## 2024-06-12 ASSESSMENT — PAIN DESCRIPTION - LOCATION
LOCATION: SHOULDER
LOCATION: SHOULDER

## 2024-06-12 ASSESSMENT — PAIN DESCRIPTION - DESCRIPTORS
DESCRIPTORS: DULL;ACHING
DESCRIPTORS: DULL;ACHING

## 2024-06-12 ASSESSMENT — PAIN SCALES - GENERAL
PAINLEVEL_OUTOF10: 2
PAINLEVEL_OUTOF10: 0
PAINLEVEL_OUTOF10: 4
PAINLEVEL_OUTOF10: 8
PAINLEVEL_OUTOF10: 8
PAINLEVEL_OUTOF10: 0

## 2024-06-12 ASSESSMENT — ENCOUNTER SYMPTOMS: SHORTNESS OF BREATH: 1

## 2024-06-12 ASSESSMENT — PAIN DESCRIPTION - ORIENTATION: ORIENTATION: LEFT

## 2024-06-12 ASSESSMENT — LIFESTYLE VARIABLES: SMOKING_STATUS: 1

## 2024-06-12 NOTE — BRIEF OP NOTE
Brief Postoperative Note      Patient: Tuan Lea  YOB: 1950  MRN: [id number]    Date of Procedure: 6/12/2024    Pre-Op Diagnosis Codes:     * Closed 2-part displaced fracture of surgical neck of left humerus [S42.222A]    Post-Op Diagnosis: Same       Procedure(s):  HUMERUS OPEN REDUCTION INTERNAL FIXATION    Surgeon(s):  Alexey Sears DO    Assistant:  * No surgical staff found *    Anesthesia: General    Estimated Blood Loss (mL): 150 mL    Complications: None    Specimens:   * No specimens in log *    Implants:  Implant Name Type Inv. Item Serial No.  Lot No. LRB No. Used Action   PLATE BNE L90MM 4 H L PROX HUM HI ALPS - [id number]  PLATE BNE L90MM 4 H L PROX HUM HI ALPS  RADHA [employer]OMET TRAUMA-WD IN REP PAN Left 1 Implanted   PEG BNE FIX L32MM DIA3.2MM PROX HUM G JARRETT FOR ALPS PLATING - [id number]  PEG BNE FIX L32MM DIA3.2MM PROX HUM G JARRETT FOR ALPS PLATING  RADHA [employer]OMET TRAUMA-WD IN REP PAN Left 1 Implanted   PEG BNE FIX L34MM DIA3.2MM PROX HUM G JARRETT FOR ALPS PLATING - [id number]  PEG BNE FIX L34MM DIA3.2MM PROX HUM G JARRETT FOR ALPS PLATING  RADHA BIOMET TRAUMA-WD IN REP PAN Left 1 Implanted   PEG BNE FIX L38MM DIA3.2MM PROX HUM G JARRETT FOR ALPS PLATING - [id number]  PEG BNE FIX L38MM DIA3.2MM PROX HUM G JARRETT FOR ALPS PLATING  RADHA BIOMET TRAUMA-WD IN REP PAN Left 1 Implanted   PEG BNE FIX L40MM DIA3.2MM PROX HUM G JARRETT FOR ALPS PLATING - [id number]  PEG BNE FIX L40MM DIA3.2MM PROX HUM G JARRETT FOR ALPS PLATING  RADHA BIOMET TRAUMA-WD IN REP PAN Left 2 Implanted   PEG BNE FIX L42MM DIA3.2MM PROX HUM G JARRETT FOR ALPS PLATING - [id number]  PEG BNE FIX L42MM DIA3.2MM PROX HUM G JARRETT FOR ALPS PLATING  RADHA BIOMET TRAUMA-WD IN REP PAN Left 2 Implanted   PEG BNE FIX L50MM DIA3.2MM PROX HUM G JARRETT FOR ALPS PLATING - [id number]  PEG BNE FIX L50MM DIA3.2MM PROX HUM G JARRETT FOR ALPS PLATING  RADHA BIOMET TRAUMA-WD IN REP PAN Left 1 Implanted   PEG BNE FIX L54MM DIA3.2MM PROX HUM G JARRETT  FOR ALPS PLATING - [id number]  PEG BNE FIX L54MM DIA3.2MM PROX HUM G JARRETT FOR ALPS PLATING  RADHA BIOMET TRAUMA-WD IN REP PAN Left 1 Implanted   SCREW BNE L30MM DIA3.5MM STD WILLIE DST TIB TI ST JARRETT FULL - [id number]  SCREW BNE L30MM DIA3.5MM STD WILLIE DST TIB TI ST JARRETT FULL  RADHA BIOMET TRAUMA-WD IN REP PAN Left 3 Implanted   SCREW BNE L32MM DIA3.5MM STD WILLIE DST TIB TI ST JARRETT FULL - [id number]  SCREW BNE L32MM DIA3.5MM STD WILLIE DST TIB TI ST JARRETT FULL  RADHA BIOMET TRAUMA-WD IN REP PAN Left 1 Implanted   SCREW BNE L30MM DIA3.5MM PROX HUM NONLOCKING T15 LO PROF - [id number]  SCREW BNE L30MM DIA3.5MM PROX HUM NONLOCKING T15 LO PROF  RADHA BIOMET TRAUMA-WD IN REP PAN Left 1 Implanted         Drains: * No LDAs found *    Findings:  Infection Present At Time Of Surgery (PATOS) (choose all levels that have infection present):  No infection present  Other Findings: Left proximal humerus fracture      Electronically signed by MUNA WICK DO on 6/12/2024 at 9:52 AM

## 2024-06-12 NOTE — PROGRESS NOTES
1200: VSS, call light in reach, beverage at bedside. Per patient no family/visitors here. Patient denies pain. Updated patient still awaiting bed assignment per nursing supervisor  1300: patient resting in bed, call light in reach, patient denies pain.   1330: VSS, lunch ordered for patient.  1400: patient sitting at bedside eating  1500: report called to nurse colette 1N  1510: patient transported by house transport employee to 1N

## 2024-06-12 NOTE — ANESTHESIA PRE PROCEDURE
Department of Anesthesiology  Preprocedure Note       Name:  Tuan Lea   Age:  73 y.o.  :  1950                                          MRN:  [id number]         Date:  2024      Surgeon: Surgeon(s):  Alexey Sears DO    Procedure: Procedure(s):  HUMERUS OPEN REDUCTION INTERNAL FIXATION    Medications prior to admission:   Prior to Admission medications    Medication Sig Start Date End Date Taking? Authorizing Provider   cyclobenzaprine (FLEXERIL) 10 MG tablet Take 1 tablet by mouth 3 times daily as needed for Muscle spasms   Yes Madison Saldivar MD   HYDROcodone-acetaminophen (NORCO) 5-325 MG per tablet Take 1 tablet by mouth every 6 hours as needed for Pain. Max Daily Amount: 4 tablets   Yes Madison Saldivar MD   diphenoxylate-atropine (LOMOTIL) 2.5-0.025 MG per tablet Take 1 tablet by mouth 4 times daily for 360 days. Max Daily Amount: 4 tablets 24  Navi Chaves MD   losartan (COZAAR) 50 MG tablet Take 1 tablet by mouth daily    Madison Saldivar MD   ergocalciferol (ERGOCALCIFEROL) 1.25 MG (07327 UT) capsule  21   Madison Saldivar MD   sertraline (ZOLOFT) 100 MG tablet  16   Madison Saldivar MD   vitamin D (ERGOCALCIFEROL) 1.25 MG (40965 UT) CAPS capsule Take 1 capsule by mouth Twice a Week 23   Navi Chaves MD   vedolizumab (ENTYVIO) 300 MG injection Infuse intravenously Every 8 weeks    Madison Saldivar MD   Esomeprazole Magnesium (NEXIUM 24HR PO) Take 20 mg by mouth daily    Madison Saldivar MD   fenofibrate (TRICOR) 145 MG tablet Take 1 tablet by mouth daily    Madison Saldivar MD   carvedilol (COREG) 3.125 MG tablet Take 1 tablet by mouth 2 times daily (with meals)    Madison Saldivar MD   amLODIPine (NORVASC) 10 MG tablet Take 1 tablet by mouth daily 23   Latrice Radford MD   prednisoLONE acetate (PRED FORTE) 1 % ophthalmic suspension Place 1 drop into the left eye three times a week    Provider 
prep         ROS comment: Chrons   Short gut .   Endo/Other:                     Abdominal:             Vascular:          Other Findings:         Anesthesia Plan      regional and general     ASA 3       Induction: intravenous.          Plan discussed with CRNA.                  Aung Jessica MD   6/12/2024       Pre Anesthesia Assessment complete. Chart reviewed on 6/12/2024

## 2024-06-12 NOTE — PROGRESS NOTES
1000- pt. Arrived to pacu via cart from OR. Pt. Attached to monitor and alarms are on. Received report from STEVE Moore and HILARIA Gann. Pt. Is drowsy from anesthesia but responds to verbal stimuli and able to follow commands. Pt. Denies pain or n/v. Pt. Left arm numb from pre op nerve block. Pt. Is wearing a simple mask at 6L. SR on the monitor. IV infusing without any issues. Dressing to left arm is clean dry and intact. Pt. Is wearing a sling.   1100- pt. Is aox4. Pt. Denies pain or n/v. Left arm is still numb from nerve block. Dressing is clean dry and intact. Pt. Has tolerated ice chips. Pt. Is on 2L via NC. SR on the monitor. Pt. Currently waiting for room assignment.  1130- pt. To transfer to [facility] to wait for room assignment. Pt.updated on plan of care and Denies any other questions or concerns.

## 2024-06-12 NOTE — OP NOTE
DATE OF PROCEDURE:  6/12/2024     PREOPERATIVE DIAGNOSIS:  Left 2 part proximal humerus fracture.     POSTOPERATIVE DIAGNOSIS:  Left 2 part proximal humerus fracture.     PROCEDURE:          1.  Open reduction and internal fixation of left 2 part        proximal humerus fracture using Brandt 4 hole high proximal humeral locking        plate.           2.  Fluoroscopic guidance and interpretation     ATTENDING SURGEON:  Alexey Sears DO     ANESTHESIA:  General with regional block.     ESTIMATED BLOOD LOSS:  150 mL.     FLUIDS:  800 mL of crystalloids.     INDICATION FOR PROCEDURE:  The patient is a 73-year-old male who  sustained a fall landing onto his left arm.  He was initially seen in  the emergency department and subsequently followed up in my office.   Evaluation of his x-rays revealed a displaced 2 part proximal  humerus fracture.    I discussed conservative versus surgical options with him and given the fracture pattern, I recommend surgical  treatment.  After explaining both options to him, he opted to proceed with surgical treatment.  I explained the risks, benefits, possible complications of  the procedure to the patient and after answering all of his questions,  he consented to undergo the above procedure.     REPORT OF PROCEDURE:  The patient was seen, evaluated in the  preoperative holding area where the left upper extremity was signed in  his presence.  At this point, care of the patient was turned over to  anesthesia team who performed a regional block to the left upper  extremity.  He was then transported back to the operative suite.   General anesthesia was applied and once adequate anesthesia was  obtained, he was placed in the lazy beach-chair position.  The left  upper extremity was then prepped and draped in usual sterile fashion.   Preoperative antibiotics were administered.  At this point a time-out  was performed and all in attendance were in agreement.  1 g of TXA was administered IV.      I marked out the planned surgical incision overlying the anterior aspect  of the left shoulder to perform a standard deltopectoral approach to  The left shoulder.  I then made an incision at this location and  carried sharp dissection down through the subcutaneous fat to the level  of the deltopectoral interval.  I then identified the cephalic vein.  I  then bluntly dissected deep to the deltoid between the pectoralis muscle  plane.  I then abducted the shoulder and elevated the deltoid off of the  proximal humerus.  I then incised the fascia alongside the conjoint  tendon for further exposure.  I then placed a brown deltoid retractor retracting the deltoid laterally and exposing the humeral head.       Once I had exposure of the fracture site, I then dissected out the  biceps tendon.  I found that this was intact.  I found that there was a short oblique fracture through the humeral neck with significant lateral displacement of the humeral head and medial displacement of the humeral shaft.  I then used a rongeur to debride soft callus from around the fracture site.  I then used a Garrett elevator to elevate the humeral shaft and rotated this into near anatomic alignment utilizing the bicipital groove as a landmark.     While holding the fracture fragments reduced, I confirmed near anatomic  reduction under fluoroscopy in the AP and lateral planes.     Once satisfactory reduction was obtained, the wound was irrigated with  sterile normal saline using bulb irrigation.  I did debride soft tissue  and hematoma from round the fracture site.  I then selected a Brandt 4-hole high  proximal humeral locking plate which was positioned appropriately  on the lateral aspect of the humeral head fragment just lateral to the  biceps tendon.  I then drilled and placed multiple smooth locking pegs for  initial fixation into the humeral head.  I then used the plate and screw  construct to elevate the humeral head applying traction and

## 2024-06-12 NOTE — ANESTHESIA POSTPROCEDURE EVALUATION
Department of Anesthesiology  Postprocedure Note    Patient: Tuan Lea  MRN: [id number]  YOB: 1950  Date of evaluation: 6/12/2024    Procedure Summary       Date: 06/12/24 Room / Location: [address]    Anesthesia Start: 0758 Anesthesia Stop: 1004    Procedure: HUMERUS OPEN REDUCTION INTERNAL FIXATION (Left: Shoulder) Diagnosis:       Closed 2-part displaced fracture of surgical neck of left humerus      (Closed 2-part displaced fracture of surgical neck of left humerus [S42.222A])    Surgeons: Alexey Sears DO Responsible Provider: Aung Jessica MD    Anesthesia Type: regional, general ASA Status: 3            Anesthesia Type: No value filed.    Makenna Phase I: Makenna Score: 7    Makenna Phase II:      Anesthesia Post Evaluation    Patient location during evaluation: PACU  Patient participation: complete - patient participated  Level of consciousness: awake  Airway patency: patent  Nausea & Vomiting: no nausea  Respiratory status: acceptable  Pain management: adequate    There were no known notable events for this encounter.

## 2024-06-12 NOTE — PROGRESS NOTES
4 Eyes Skin Assessment     NAME:  Tuan Lea  YOB: 1950  MEDICAL RECORD NUMBER:  [id number]    The patient is being assessed for  Admission    I agree that at least one RN has performed a thorough Head to Toe Skin Assessment on the patient. ALL assessment sites listed below have been assessed.      Areas assessed by both nurses:    Head, Face, Ears, Shoulders, Back, Chest, Arms, Elbows, Hands, Sacrum. Buttock, Coccyx, Ischium, Legs. Feet and Heels, and Under Medical Devices         Does the Patient have a Wound? No noted wound(s)       Baldomero Prevention initiated by RN: No  Wound Care Orders initiated by RN: No    Pressure Injury (Stage 3,4, Unstageable, DTI, NWPT, and Complex wounds) if present, place Wound referral order by RN under : No    New Ostomies, if present place, Ostomy referral order under : No     Nurse 1 eSignature: Electronically signed by Rajan Portillo RN on 6/12/24 at 4:28 PM EDT    **SHARE this note so that the co-signing nurse can place an eSignature**    Nurse 2 eSignature: {Esignature:[id number]}

## 2024-06-12 NOTE — H&P
Progress Notes  Alexey Sears DO (Physician)  Orthopedic Surgery  Expand All Collapse All          Subjective   Patient ID: Tuan Lea is a 73 y.o. male.        He comes in today for recheck of his left shoulder injury.  He states that since I saw him last the pain in his left shoulder has been improving but he is kept his sling on and has not been using his left arm.  He is continuing to complain of deep, aching and throbbing pain globally in the left shoulder.  Patient denies any prior history of pain or injury to the involved extremity/ joint, denies numbness or tingling in the involved extremity and denies fever or chills.  The patient denies any loss of consciousness and states that this was simply a mechanical fall.              Review of Systems   Constitutional:  Negative for activity change, chills and fever.   HENT:  Negative for congestion and drooling.    Eyes:  Negative for redness.   Respiratory:  Negative for shortness of breath.    Cardiovascular:  Negative for chest pain.   Gastrointestinal:  Negative for abdominal pain.   Endocrine: Negative for cold intolerance and heat intolerance.   Musculoskeletal:  Positive for arthralgias and myalgias. Negative for gait problem and joint swelling.   Skin:  Negative for color change, pallor, rash and wound.   Neurological:  Negative for weakness and numbness.   Psychiatric/Behavioral:  Negative for confusion.       Past Medical History        Past Medical History:   Diagnosis Date    Acute renal failure with tubular necrosis (HCC) 07/05/2021    Anxiety, generalized      Crohn's disease (HCC)      Crohn's disease of small and large intestines with complication (HCC) 01/05/2021    Crohn's disease without complication (HCC) 08/27/2020    Depression      GERD (gastroesophageal reflux disease)      Hepatitis       hepatitis when a child    High cholesterol      Hx of echocardiogram 06/22/2022     No significant valvular disease noted. Dilation of  ascending aorta(3.8cm) . Normal diastolic filling pattern for age.    Liver disease      Primary hypertension 8/18/2022    Psychiatric problem                 No current facility-administered medications on file prior to encounter.     Current Outpatient Medications on File Prior to Encounter   Medication Sig Dispense Refill    cyclobenzaprine (FLEXERIL) 10 MG tablet Take 1 tablet by mouth 3 times daily as needed for Muscle spasms      HYDROcodone-acetaminophen (NORCO) 5-325 MG per tablet Take 1 tablet by mouth every 6 hours as needed for Pain.      diphenoxylate-atropine (LOMOTIL) 2.5-0.025 MG per tablet Take 1 tablet by mouth 4 times daily for 360 days. Max Daily Amount: 4 tablets 360 tablet 3    losartan (COZAAR) 50 MG tablet Take 1 tablet by mouth daily      sertraline (ZOLOFT) 100 MG tablet       vitamin D (ERGOCALCIFEROL) 1.25 MG (96730 UT) CAPS capsule Take 1 capsule by mouth Twice a Week 24 capsule 3    vedolizumab (ENTYVIO) 300 MG injection Infuse intravenously Every 8 weeks      Esomeprazole Magnesium (NEXIUM 24HR PO) Take 20 mg by mouth daily      fenofibrate (TRICOR) 145 MG tablet Take 1 tablet by mouth daily      carvedilol (COREG) 3.125 MG tablet Take 1 tablet by mouth 2 times daily (with meals)      amLODIPine (NORVASC) 10 MG tablet Take 1 tablet by mouth daily 30 tablet 0    prednisoLONE acetate (PRED FORTE) 1 % ophthalmic suspension Place 1 drop into the left eye three times a week      Multiple Vitamins-Iron (MULTI-VITAMIN/IRON PO) Take by mouth      folic acid (FOLVITE) 1 MG tablet Take 1 tablet by mouth daily       Allergies   Allergen Reactions    Imuran [Azathioprine] Nausea And Vomiting    Sulfa Antibiotics Nausea And Vomiting     diarrhea     Past Surgical History:   Procedure Laterality Date    ABDOMEN SURGERY      bowel obstruction times three    COLONOSCOPY      DILATATION, ESOPHAGUS      ENDOSCOPY, COLON, DIAGNOSTIC      ERCP Bilateral 6/26/2023    ERCP SPHINCTER/PAPILLOTOMY WITH  A SWEEP

## 2024-06-12 NOTE — PROGRESS NOTES
Pt returned to room from   PACU  Report received from  Pt A&O, call light placed within reach, side rails up x's 2  Pt given water to drink

## 2024-06-12 NOTE — ANESTHESIA PROCEDURE NOTES
Peripheral Block    Patient location during procedure: procedure area  Reason for block: procedure for pain, post-op pain management and at surgeon's request  Start time: 6/12/2024 7:35 AM  End time: 6/12/2024 7:45 AM  Staffing  Performed: resident/CRNA   Anesthesiologist: Aung Jessica MD  Resident/CRNA: Teresa Sears APRN - CRNA  Performed by: Teresa Sears APRN - CRNA  Authorized by: Aung Jessica MD    Preanesthetic Checklist  Completed: patient identified, IV checked, site marked, risks and benefits discussed, surgical/procedural consents, equipment checked, pre-op evaluation, timeout performed, anesthesia consent given, oxygen available, monitors applied/VS acknowledged, fire risk safety assessment completed and verbalized and blood product R/B/A discussed and consented  Peripheral Block   Patient position: supine  Prep: ChloraPrep  Provider prep: sterile gloves  Patient monitoring: continuous pulse ox, cardiac monitor, continuous capnometry, frequent blood pressure checks, IV access and responsive to questions  Block type: Brachial plexus  Interscalene  Laterality: left  Injection technique: single-shot  Guidance: ultrasound guided  Local infiltration: lidocaine  Infiltration strength: 1 %  Local infiltration: lidocaine  Dose: 3 mL    Needle   Needle type: insulated echogenic nerve stimulator needle   Needle gauge: 20 G  Needle localization: ultrasound guidance  Needle length: 8 cm  Assessment   Injection assessment: no paresthesia on injection, negative aspiration for heme, local visualized surrounding nerve on ultrasound and no intravascular symptoms  Paresthesia pain: none  Slow fractionated injection: yes  Hemodynamics: stable  Outcomes: uncomplicated and patient tolerated procedure well    Medications Administered  ropivacaine (NAROPIN) injection 0.5% - Perineural   20 mL - 6/12/2024 7:35:00 AM

## 2024-06-13 PROCEDURE — 94761 N-INVAS EAR/PLS OXIMETRY MLT: CPT

## 2024-06-13 PROCEDURE — 97530 THERAPEUTIC ACTIVITIES: CPT

## 2024-06-13 PROCEDURE — 2580000003 HC RX 258: Performed by: ORTHOPAEDIC SURGERY

## 2024-06-13 PROCEDURE — 6370000000 HC RX 637 (ALT 250 FOR IP): Performed by: ORTHOPAEDIC SURGERY

## 2024-06-13 PROCEDURE — 97162 PT EVAL MOD COMPLEX 30 MIN: CPT

## 2024-06-13 PROCEDURE — 97116 GAIT TRAINING THERAPY: CPT

## 2024-06-13 PROCEDURE — 97166 OT EVAL MOD COMPLEX 45 MIN: CPT

## 2024-06-13 PROCEDURE — 6360000002 HC RX W HCPCS: Performed by: ORTHOPAEDIC SURGERY

## 2024-06-13 RX ORDER — HYDROMORPHONE HYDROCHLORIDE 1 MG/ML
0.5 INJECTION, SOLUTION INTRAMUSCULAR; INTRAVENOUS; SUBCUTANEOUS
Status: DISCONTINUED | OUTPATIENT
Start: 2024-06-13 | End: 2024-06-14 | Stop reason: HOSPADM

## 2024-06-13 RX ADMIN — OXYCODONE HYDROCHLORIDE 10 MG: 10 TABLET ORAL at 22:49

## 2024-06-13 RX ADMIN — HYDROMORPHONE HYDROCHLORIDE 0.5 MG: 1 INJECTION, SOLUTION INTRAMUSCULAR; INTRAVENOUS; SUBCUTANEOUS at 02:31

## 2024-06-13 RX ADMIN — SODIUM CHLORIDE, POTASSIUM CHLORIDE, SODIUM LACTATE AND CALCIUM CHLORIDE: 600; 310; 30; 20 INJECTION, SOLUTION INTRAVENOUS at 01:25

## 2024-06-13 RX ADMIN — SODIUM CHLORIDE, PRESERVATIVE FREE 10 ML: 5 INJECTION INTRAVENOUS at 21:08

## 2024-06-13 RX ADMIN — HYDROMORPHONE HYDROCHLORIDE 0.5 MG: 1 INJECTION, SOLUTION INTRAMUSCULAR; INTRAVENOUS; SUBCUTANEOUS at 06:41

## 2024-06-13 RX ADMIN — OXYCODONE HYDROCHLORIDE 10 MG: 10 TABLET ORAL at 18:45

## 2024-06-13 RX ADMIN — OXYCODONE HYDROCHLORIDE 10 MG: 10 TABLET ORAL at 05:28

## 2024-06-13 RX ADMIN — OXYCODONE HYDROCHLORIDE 10 MG: 10 TABLET ORAL at 01:18

## 2024-06-13 RX ADMIN — HYDROMORPHONE HYDROCHLORIDE 0.5 MG: 1 INJECTION, SOLUTION INTRAMUSCULAR; INTRAVENOUS; SUBCUTANEOUS at 13:00

## 2024-06-13 RX ADMIN — OXYCODONE HYDROCHLORIDE 10 MG: 10 TABLET ORAL at 14:32

## 2024-06-13 RX ADMIN — OXYCODONE HYDROCHLORIDE 10 MG: 10 TABLET ORAL at 10:18

## 2024-06-13 ASSESSMENT — PAIN SCALES - GENERAL
PAINLEVEL_OUTOF10: 5
PAINLEVEL_OUTOF10: 9
PAINLEVEL_OUTOF10: 7
PAINLEVEL_OUTOF10: 7
PAINLEVEL_OUTOF10: 6
PAINLEVEL_OUTOF10: 4
PAINLEVEL_OUTOF10: 8
PAINLEVEL_OUTOF10: 6
PAINLEVEL_OUTOF10: 3
PAINLEVEL_OUTOF10: 7
PAINLEVEL_OUTOF10: 6
PAINLEVEL_OUTOF10: 4
PAINLEVEL_OUTOF10: 6
PAINLEVEL_OUTOF10: 7
PAINLEVEL_OUTOF10: 8
PAINLEVEL_OUTOF10: 5
PAINLEVEL_OUTOF10: 7
PAINLEVEL_OUTOF10: 8
PAINLEVEL_OUTOF10: 8

## 2024-06-13 ASSESSMENT — PAIN DESCRIPTION - DESCRIPTORS
DESCRIPTORS: ACHING;SHARP
DESCRIPTORS: ACHING;DISCOMFORT;SORE;TENDER
DESCRIPTORS: DULL;ACHING
DESCRIPTORS: ACHING;DULL
DESCRIPTORS: ACHING;DISCOMFORT
DESCRIPTORS: ACHING;SORE
DESCRIPTORS: ACHING;DISCOMFORT;SORE
DESCRIPTORS: ACHING;DULL;DISCOMFORT
DESCRIPTORS: ACHING;CRUSHING
DESCRIPTORS: ACHING;THROBBING

## 2024-06-13 ASSESSMENT — PAIN DESCRIPTION - LOCATION
LOCATION: SHOULDER
LOCATION: ARM;SHOULDER
LOCATION: SHOULDER

## 2024-06-13 ASSESSMENT — PAIN - FUNCTIONAL ASSESSMENT
PAIN_FUNCTIONAL_ASSESSMENT: PREVENTS OR INTERFERES SOME ACTIVE ACTIVITIES AND ADLS
PAIN_FUNCTIONAL_ASSESSMENT: ACTIVITIES ARE NOT PREVENTED
PAIN_FUNCTIONAL_ASSESSMENT: ACTIVITIES ARE NOT PREVENTED
PAIN_FUNCTIONAL_ASSESSMENT: PREVENTS OR INTERFERES SOME ACTIVE ACTIVITIES AND ADLS
PAIN_FUNCTIONAL_ASSESSMENT: ACTIVITIES ARE NOT PREVENTED
PAIN_FUNCTIONAL_ASSESSMENT: PREVENTS OR INTERFERES SOME ACTIVE ACTIVITIES AND ADLS
PAIN_FUNCTIONAL_ASSESSMENT: ACTIVITIES ARE NOT PREVENTED

## 2024-06-13 ASSESSMENT — PAIN SCALES - WONG BAKER
WONGBAKER_NUMERICALRESPONSE: HURTS A LITTLE BIT
WONGBAKER_NUMERICALRESPONSE: NO HURT
WONGBAKER_NUMERICALRESPONSE: HURTS A LITTLE BIT
WONGBAKER_NUMERICALRESPONSE: NO HURT

## 2024-06-13 ASSESSMENT — PAIN DESCRIPTION - ORIENTATION
ORIENTATION: LEFT
ORIENTATION: RIGHT;DISTAL
ORIENTATION: LEFT

## 2024-06-13 ASSESSMENT — PAIN DESCRIPTION - FREQUENCY
FREQUENCY: INTERMITTENT
FREQUENCY: CONTINUOUS

## 2024-06-13 ASSESSMENT — PAIN DESCRIPTION - ONSET
ONSET: ON-GOING
ONSET: ON-GOING

## 2024-06-13 ASSESSMENT — PAIN DESCRIPTION - PAIN TYPE
TYPE: SURGICAL PAIN
TYPE: SURGICAL PAIN

## 2024-06-13 NOTE — PROGRESS NOTES
Tuan Lea is a 73 y.o. male patient.  No diagnosis found.  Past Medical History:   Diagnosis Date    Acute renal failure with tubular necrosis (HCC) 07/05/2021    Anxiety, generalized     Crohn's disease of small and large intestines with complication (HCC) 01/05/2021    Depression     GERD (gastroesophageal reflux disease)     Hepatitis     hepatitis when a child    High cholesterol     Hx of echocardiogram 06/22/2022    No significant valvular disease noted. Dilation of ascending aorta(3.8cm) . Normal diastolic filling pattern for age.    Liver disease     Primary hypertension 08/18/2022    Psychiatric problem      Past Surgical History Pertinent Negatives:   Procedure Date Noted    APPENDECTOMY 03/17/2016    BACK SURGERY 03/17/2016    BREAST SURGERY 03/17/2016    CARDIAC SURGERY 03/17/2016    CHOLECYSTECTOMY 03/17/2016    COSMETIC SURGERY 03/17/2016    FRACTURE SURGERY 03/17/2016    HERNIA REPAIR 03/17/2016    HYSTERECTOMY (CERVIX STATUS UNKNOWN) 03/17/2016    JOINT REPLACEMENT 03/17/2016    KIDNEY TRANSPLANT 03/17/2016    PACEMAKER PLACEMENT 03/17/2016    SKIN BIOPSY 03/17/2016    VASCULAR SURGERY 03/17/2016     Scheduled Meds:   sodium chloride flush  5-40 mL IntraVENous 2 times per day     Continuous Infusions:   lactated ringers IV soln 125 mL/hr at 06/13/24 0125    sodium chloride       PRN Meds:HYDROmorphone, sodium chloride flush, sodium chloride, oxyCODONE **OR** oxyCODONE, ondansetron **OR** ondansetron    Allergies   Allergen Reactions    Imuran [Azathioprine] Nausea And Vomiting    Sulfa Antibiotics Nausea And Vomiting     diarrhea     Principal Problem:    Closed 2-part displaced fracture of surgical neck of left humerus  Resolved Problems:    * No resolved hospital problems. *    Blood pressure (!) 162/82, pulse 76, temperature 97.6 °F (36.4 °C), temperature source Oral, resp. rate 18, height 1.702 m (5' 7\"), weight 75.3 kg (166 lb), SpO2 98 %.    Subjective  Patient seen and examined,  resting in bed comfortably, pain controlled, no new complaints.  Painful overnight but doing better this morning.  Not yet up with PT.  He does not yet feel comfortable going home since he lives by himself.    Objective  LUE - Dressing removed, incision clean, dry, intact, no calf TTP, compartments soft, neurovascularly intact distally.    Assessment & Plan  POD #1 ORIF left proximal humerus fracture, Doing well postoperatively    Continue no lifting, pushing, pulling with left arm.  Continue range of motion exercises.  Pain control.  DVT prophylaxis.  Continue to ice and elevate.  Continue PT/OT.  Discharge planning for home either possibly later this afternoon or tomorrow.    MUNA WICK, DO  6/13/2024

## 2024-06-13 NOTE — CONSULTS
[facility] ACUTE CARE PHYSICAL THERAPY EVALUATION  Tuan Lea, 1950, 1101/1101-A, 6/13/2024    History  [place]:  There were no encounter diagnoses.  Patient  has a past medical history of Acute renal failure with tubular necrosis (HCC), Anxiety, generalized, Crohn's disease of small and large intestines with complication (HCC), Depression, GERD (gastroesophageal reflux disease), Hepatitis, High cholesterol, Hx of echocardiogram, Liver disease, Primary hypertension, and Psychiatric problem.  Patient  has a past surgical history that includes Small intestine surgery (03/09/2016); Colonoscopy; Endoscopy, colon, diagnostic; Dilatation, esophagus; eye surgery; Tonsillectomy; Small intestine surgery (12/15/2015); Abdomen surgery; sigmoidoscopy (N/A, 07/12/2022); ERCP (Bilateral, 6/26/2023); proctosigmoidoscopy (N/A, 7/5/2023); XR MIDLINE EQUAL OR GREATER THAN 5 YEARS (10/6/2015); and Arm Surgery (Left, 6/12/2024).    Discharge Recommendation:  To be safe at home, would need continual support/supervision, physical assist for home entry/exit, and home health physical therapy service.    Subjective:    Patient states:  \"I'm going to politely decline home health, you don't think my walking/balance will get better the more I walk?\"      Pain:  Pt reports L shoulder pain, rated 4-5/10       Communication with other providers:  Handoff to RN Christine, co-evaluation with OT Mikael and OT student Deepa to maximize safety and for tolerance, KASHIF Trujillo for DC recs     Restrictions: general precautions, fall risk, LUE in sling for comfort, OK for gentle active and passive ROM L shoulder, NWB LUE     Home Setup/Prior level of function  Social/Functional History  Lives With: Alone  Type of Home: House  Home Layout: One level  Home Access: Stairs to enter without rails  Entrance Stairs - Number of Steps: 3  Bathroom Shower/Tub: Tub/Shower unit, Shower chair without back  Bathroom Equipment: Grab bars in  mobility, transfers, balance, gait, TA, TX, neuro re-ed     Recommendations for NURSING mobility: ambulate using no AD with CGA     Time:   Time in: 1509  Time out: 1533  Timed treatment minutes: 12  Total time: 24    Electronically signed by:    Clive Martin PT, DPT  6/13/2024, 3:54 PM

## 2024-06-13 NOTE — CARE COORDINATION
06/13/24 1548   Service Assessment   Patient Orientation Alert and Oriented   Cognition Alert   History Provided By Medical Record;Patient   Primary Caregiver Self   Support Systems Children;Friends/Neighbors   Patient's Healthcare Decision Maker is: Patient Declined (Legal Next of Kin Remains as Decision Maker)   PCP Verified by CM Yes   Last Visit to PCP Within last 6 months   Prior Functional Level Independent in ADLs/IADLs   Current Functional Level Assistance with the following:;Mobility   Can patient return to prior living arrangement Yes   Ability to make needs known: Good   Family able to assist with home care needs: No   Would you like for me to discuss the discharge plan with any other family members/significant others, and if so, who? No   Financial Resources Medicare   Community Resources None       RN CM to bedside to initiate DC planning with patient. Patient is from home, independent prior to admission. PCP and insurance. Pt declined the need for HHC or DME. Denies any other needs at this time. CM following for any new needs that may arise.

## 2024-06-13 NOTE — PROGRESS NOTES
Occupational Therapy  [facility] ACUTE CARE OCCUPATIONAL THERAPY EVALUATION    Tuan Lea, 1950, 1101/1101-A, 6/13/2024    Discharge Recommendation: Home with initial 24 hour supervision/assistance, Home Health OT services, [facility]    History:  [place]:  There were no encounter diagnoses.    Subjective:  Patient states: \"Can I get a better sling?\"  Pain: 4/10 pain in L shoulder  Communication with other providers: RN Christine, PT Clive, CM Ronnie  Restrictions: General Precautions, Fall Risk, Bed Alarm, Gentle AROM/PROM of L shoulder allowed, NWB LUE with sling for comfort    Home Setup/Prior level of function:  Social/Functional History  Lives With: Alone  Type of Home: House  Home Layout: One level  Home Access: Stairs to enter without rails  Entrance Stairs - Number of Steps: 3  Bathroom Shower/Tub: Tub/Shower unit, Shower chair without back  Bathroom Equipment: Grab bars in shower  Bathroom Accessibility: Accessible  Home Equipment: Walker - Rolling  Has the patient had two or more falls in the past year or any fall with injury in the past year?: Yes  Receives Help From: Friend(s)  ADL Assistance: Independent  Homemaking Assistance: Independent  Homemaking Responsibilities: Yes  Ambulation Assistance: Independent (uses no AD)  Transfer Assistance: Independent  Active : Yes  Occupation: Retired    Examination:  Observation: Supine in bed upon arrival, agreeable to session  Vision: wears glasses  Hearing: WFL  Vitals: Stable vitals throughout session    Body Systems and functions:  ROM: WFL in all joints in RUE  Strength: 4+/5 in all major muscle groups in RUE, DNT LUE d/t precautions   Sensation: WFL  Tone: Normal  Coordination: WFL  Perception: WNL    Activities of Daily Living (ADLs):  Feeding: IND  Grooming: SBA  UB bathing: Rohit for RUE  LB bathing: SBA  UB dressing: SBA  LB dressing: SBA to denton socks at EOB with increased time  Toileting: CGA    Cognitive and Psychosocial  neck of the L humerus. Pt underwent open reduction and internal fixation of left 2 part proximal humerus fx. Pt lives at home alone and typically performs ADLs, IADLs, and ambulates independently with no use of AD. Pt would benefit from continued acute care OT services as well as returning home with initial 24-hour supervision/assistance as well as Home Health OT at discharge and a WBQC.    Complexity: Moderate  Prognosis: Good  Plan: 2x/week    Goals:  1. Pt will complete all aspects of bed mobility for EOB/OOB ADLs IND.  2. Pt will complete UB/LB bathing IND.  3. Pt will complete all aspects of LB dressing IND.  4. Pt will complete all functional transfers to and from bed, chair, toilet, shower chair IND.  5. Pt will ambulate HH distance to bathroom for toileting Luis A with WBQC.  6. Pt will complete all aspects of toileting task IND.  7. Pt will complete oral hygiene/grooming routine in standing at sink IND.  8. Pt will complete ther ex/ther act with focus on dynamic standing balance and will adhere to 3/3 precautions 100% of the time.    Time:   Time in: 1509  Time out: 1533  Timed treatment minutes: 10  Total time: 24    Electronically signed by:    JONELLE Maynard/LITO Haines/BJ AGUILERA [id number]

## 2024-06-14 VITALS
HEART RATE: 82 BPM | RESPIRATION RATE: 18 BRPM | BODY MASS INDEX: 30.87 KG/M2 | TEMPERATURE: 98 F | SYSTOLIC BLOOD PRESSURE: 142 MMHG | WEIGHT: 196.65 LBS | HEIGHT: 67 IN | OXYGEN SATURATION: 97 % | DIASTOLIC BLOOD PRESSURE: 85 MMHG

## 2024-06-14 PROBLEM — S42.202A: Status: ACTIVE | Noted: 2024-06-14

## 2024-06-14 PROCEDURE — 97530 THERAPEUTIC ACTIVITIES: CPT

## 2024-06-14 PROCEDURE — 6370000000 HC RX 637 (ALT 250 FOR IP): Performed by: ORTHOPAEDIC SURGERY

## 2024-06-14 RX ORDER — OXYCODONE HYDROCHLORIDE 5 MG/1
5 TABLET ORAL EVERY 6 HOURS PRN
Qty: 28 TABLET | Refills: 0 | Status: SHIPPED | OUTPATIENT
Start: 2024-06-14 | End: 2024-06-21

## 2024-06-14 RX ADMIN — OXYCODONE HYDROCHLORIDE 10 MG: 10 TABLET ORAL at 02:46

## 2024-06-14 RX ADMIN — OXYCODONE HYDROCHLORIDE 10 MG: 10 TABLET ORAL at 06:53

## 2024-06-14 ASSESSMENT — PAIN DESCRIPTION - PAIN TYPE: TYPE: SURGICAL PAIN

## 2024-06-14 ASSESSMENT — PAIN DESCRIPTION - LOCATION
LOCATION: ARM;SHOULDER

## 2024-06-14 ASSESSMENT — PAIN - FUNCTIONAL ASSESSMENT: PAIN_FUNCTIONAL_ASSESSMENT: PREVENTS OR INTERFERES SOME ACTIVE ACTIVITIES AND ADLS

## 2024-06-14 ASSESSMENT — PAIN SCALES - WONG BAKER
WONGBAKER_NUMERICALRESPONSE: NO HURT
WONGBAKER_NUMERICALRESPONSE: 0;2
WONGBAKER_NUMERICALRESPONSE: NO HURT
WONGBAKER_NUMERICALRESPONSE: NO HURT

## 2024-06-14 ASSESSMENT — PAIN SCALES - GENERAL
PAINLEVEL_OUTOF10: 0
PAINLEVEL_OUTOF10: 6
PAINLEVEL_OUTOF10: 6
PAINLEVEL_OUTOF10: 3
PAINLEVEL_OUTOF10: 3
PAINLEVEL_OUTOF10: 4

## 2024-06-14 ASSESSMENT — PAIN DESCRIPTION - ORIENTATION
ORIENTATION: LEFT

## 2024-06-14 ASSESSMENT — PAIN DESCRIPTION - FREQUENCY: FREQUENCY: INTERMITTENT

## 2024-06-14 ASSESSMENT — PAIN DESCRIPTION - ONSET: ONSET: GRADUAL

## 2024-06-14 ASSESSMENT — PAIN DESCRIPTION - DESCRIPTORS
DESCRIPTORS: ACHING;DULL
DESCRIPTORS: ACHING;DULL;DISCOMFORT
DESCRIPTORS: ACHING;DULL;DISCOMFORT;THROBBING

## 2024-06-14 NOTE — PROGRESS NOTES
Physical Therapy    Physical Therapy Treatment Note  Name: Tuan Lea MRN: [id number] :   1950   Date:  2024   Admission Date: 2024 Room:  [address] [place] [address]   Restrictions/Precautions: general precautions, fall risk, LUE in sling for comfort, OK for gentle active and passive ROM L shoulder, NWB LUE     Communication with other providers:  HILARIA Orozco reports pt is appropriate to participate with PT     Subjective:  Patient states:  \"how can I get a hold of Dr. Sears?\"   Pain:   Location, Type, Intensity (0/10 to 10/10):  pt reports mild L shoulder pain, did not rate     Objective:    Observation:  pt found resting in bed upon arrival, pleasant and agreeable to participate with PT   Objective Measures:  on room air     Treatment, including education/measures:  Supine > sit: Matteo using no bed rail with HOB elevated. Scooted to EOB independently. Reminders to not utilize LUE to assist with scooting     Sit to supine: IND. Pt was able to reposition self in bed     Sit balance: normal, independent. PT assisted with proper sling fit     Sit to stand/stand to sit: supervision using no AD     Pt participated in squat taps at EOB x10 reps with SBA using no BUE for support for BLE strengthening. No noted instability, LOB, or knee buckling throughout     Functional transfers: CGA-SBA using no AD. Minimal instability throughout. No noted LOB or knee buckling     Gait: 400ft using no AD with CGA-SBA. Gait speed is reduced with adequate step length and foot clearance. 1 episode of LOB requiring CGA to maximize safety. Pt was able to reach for counter to assist with regaining balance. Minimal instability throughout. No noted LOB, knee buckling, or significant gait deviations     Stairs: ascend x2 (6\") and descend x3 (4\") steps using no AD with CGA, step to gait pattern. Minimal instability without LOB or knee buckling throughout.     Education: safety upon discharge home     Safety:  gait belt used, pt returned  to bed at end of session, call light/phone placed in reach, bed alarm intact, all needs met     Assessment / Impression:      Patient's tolerance of treatment:  good    Adverse Reaction: none   Significant change in status and impact:  none   Barriers to improvement: none     Plan for Next Session:    Continue current PT POC and progress mobility, as appropriate and tolerable     Time in:  0850  Time out:  0904  Timed treatment minutes:  14  Total treatment time:  14    Previously filed items:  Social/Functional History  Lives With: Alone  Type of Home: House  Home Layout: One level  Home Access: Stairs to enter without rails  Entrance Stairs - Number of Steps: 3  Bathroom Shower/Tub: Tub/Shower unit, Shower chair without back  Bathroom Equipment: Grab bars in shower  Bathroom Accessibility: Accessible  Home Equipment: Walker - Rolling  Has the patient had two or more falls in the past year or any fall with injury in the past year?: Yes  Receives Help From: Friend(s)  ADL Assistance: Independent  Homemaking Assistance: Independent  Homemaking Responsibilities: Yes  Ambulation Assistance: Independent  Transfer Assistance: Independent  Active : Yes  Occupation: Retired        Short Term Goals  Time Frame for Short Term Goals: 1 week or until discharge  Short Term Goal 1: Pt will be able to perform all aspects of bed mobility with Matteo  Short Term Goal 2: Pt will be able to perform STS using LRAD with Matteo  Short Term Goal 3: Pt will be able to perform functional transfers using LRAD with Matteo  Short Term Goal 4: Pt will be able to ambulate 300ft+ using LRAD with Matteo  Short Term Goal 5: Pt will be able to ascend/descend 3 steps using LRAD with Matteo    Electronically signed by:    Clive Martin, PT  6/14/2024, 10:18 AM

## 2024-06-14 NOTE — PLAN OF CARE
Problem: Discharge Planning  Goal: Discharge to home or other facility with appropriate resources  6/14/2024 0751 by Pam Ceja Che, RN  Outcome: Progressing  6/13/2024 2144 by Lorenzo Mchugh RN  Outcome: Progressing     Problem: Pain  Goal: Verbalizes/displays adequate comfort level or baseline comfort level  6/14/2024 0751 by Pam Ceja Che, RN  Outcome: Progressing  6/13/2024 2144 by Lorenzo Mchugh RN  Outcome: Progressing  Flowsheets (Taken 6/13/2024 0900 by Christine Grewal RN)  Verbalizes/displays adequate comfort level or baseline comfort level: Encourage patient to monitor pain and request assistance     Problem: Safety - Adult  Goal: Free from fall injury  6/14/2024 0751 by Pam Ceja Che, RN  Outcome: Progressing  6/13/2024 2144 by Lorenzo Mchugh RN  Outcome: Progressing

## 2024-06-14 NOTE — DISCHARGE SUMMARY
ADMIT DATE: 6/12/2024    DISCHARGE DATE: 6/14/2024    PROVIDER:     Alexey Wick DO                      ADMISSION DIAGNOSIS:   Left proximal humerus fracture     DISCHARGE DIAGNOSIS:  Left proximal humerus fracture.     PROCEDURE: Open reduction internal fixation left proximal humerus fracture on 6/12/2024     HOSPITAL COURSE:  The patient is a 5  year-old male who sustained an injury to his left shoulder.  For this he was brought to  [facility] on 6/12/2024 where he underwent open reduction internal fixation for his left proximal humerus fracture.  He was admitted postoperatively  for pain control, rehabilitation, and medical monitoring.  Hospitalist  was consulted for medical management.  Physical Therapy was consulted  for gait training.  He did receive antibiotic prophylaxis and DVT  prophylaxis during her hospitalization.  Throughout his hospital  course, clinically, he remained stable with no apparent medical  complications.  He was progressing well with physical therapy and his  pain was well controlled with oral medications.   was  consulted for discharge planning.  Given that clinically he was  stable, he was discharged to home on 6/14/2024 .     DISCHARGE MEDICATIONS:    1.  Oxycodone 5 mg one p.o. q.6 h. p.r.n. Pain.  2.  Other medications per the MAR.     DISCHARGE INSTRUCTIONS:    He is to use his sling as needed for comfort.  He can begin gentle pendulum exercises as instructed.  No lifting, pushing, pulling with left arm.  His incision is to be kept clean, dry, and intact at all times.    He is to ice and elevate the left lower extremity for pain and swelling.    He is to contact my office if she develops increased pain, swelling, numbness, tingling, redness, fevers, or chills.    He is to follow up in my office in 3 weeks at his prescheduled appointment.           ALEXEY WICK DO

## 2024-06-14 NOTE — PROGRESS NOTES
Tuan Lea is a 73 y.o. male patient.  No diagnosis found.  Past Medical History:   Diagnosis Date    Acute renal failure with tubular necrosis (HCC) 07/05/2021    Anxiety, generalized     Crohn's disease of small and large intestines with complication (HCC) 01/05/2021    Depression     GERD (gastroesophageal reflux disease)     Hepatitis     hepatitis when a child    High cholesterol     Hx of echocardiogram 06/22/2022    No significant valvular disease noted. Dilation of ascending aorta(3.8cm) . Normal diastolic filling pattern for age.    Liver disease     Primary hypertension 08/18/2022    Psychiatric problem      Past Surgical History Pertinent Negatives:   Procedure Date Noted    APPENDECTOMY 03/17/2016    BACK SURGERY 03/17/2016    BREAST SURGERY 03/17/2016    CARDIAC SURGERY 03/17/2016    CHOLECYSTECTOMY 03/17/2016    COSMETIC SURGERY 03/17/2016    FRACTURE SURGERY 03/17/2016    HERNIA REPAIR 03/17/2016    HYSTERECTOMY (CERVIX STATUS UNKNOWN) 03/17/2016    JOINT REPLACEMENT 03/17/2016    KIDNEY TRANSPLANT 03/17/2016    PACEMAKER PLACEMENT 03/17/2016    SKIN BIOPSY 03/17/2016    VASCULAR SURGERY 03/17/2016     Scheduled Meds:   sodium chloride flush  5-40 mL IntraVENous 2 times per day     Continuous Infusions:   lactated ringers IV soln 125 mL/hr at 06/13/24 0125    sodium chloride       PRN Meds:HYDROmorphone, sodium chloride flush, sodium chloride, oxyCODONE **OR** oxyCODONE, ondansetron **OR** ondansetron    Allergies   Allergen Reactions    Imuran [Azathioprine] Nausea And Vomiting    Sulfa Antibiotics Nausea And Vomiting     diarrhea     Principal Problem:    Closed 2-part displaced fracture of surgical neck of left humerus  Resolved Problems:    * No resolved hospital problems. *    Blood pressure (!) 142/85, pulse 82, temperature 98 °F (36.7 °C), temperature source Oral, resp. rate 18, height 1.702 m (5' 7\"), weight 89.2 kg (196 lb 10.4 oz), SpO2 97 %.    Subjective  Patient seen and examined,

## 2024-06-14 NOTE — DISCHARGE INSTRUCTIONS
Activity as tolerates except for, Non weight bearing to affected extremity.  Follow a general diet. May remove sling to shower.  Keep incision clean and dry.  Notify physician if temperature is greater than 101.5, redness or swelling occurs at incision site, increased drainage from incision site, or pain is not controlled by prescribed pain medication. No driving while taking narcotic pain medication and released by physician. May shower but no baths, hot tubs or swimming.

## 2024-06-14 NOTE — PLAN OF CARE
Problem: Discharge Planning  Goal: Discharge to home or other facility with appropriate resources  Outcome: Progressing     Problem: Pain  Goal: Verbalizes/displays adequate comfort level or baseline comfort level  Outcome: Progressing  Flowsheets (Taken 6/13/2024 0900 by Christine Grewal RN)  Verbalizes/displays adequate comfort level or baseline comfort level: Encourage patient to monitor pain and request assistance     Problem: Safety - Adult  Goal: Free from fall injury  Outcome: Progressing

## 2024-06-28 ENCOUNTER — OFFICE VISIT (OUTPATIENT)
Dept: ORTHOPEDIC SURGERY | Age: 74
End: 2024-06-28

## 2024-06-28 VITALS — HEART RATE: 60 BPM | OXYGEN SATURATION: 98 % | TEMPERATURE: 97.7 F

## 2024-06-28 DIAGNOSIS — Z98.890 S/P ORIF (OPEN REDUCTION INTERNAL FIXATION) FRACTURE: Primary | ICD-10-CM

## 2024-06-28 DIAGNOSIS — Z87.81 S/P ORIF (OPEN REDUCTION INTERNAL FIXATION) FRACTURE: Primary | ICD-10-CM

## 2024-06-28 PROCEDURE — 99024 POSTOP FOLLOW-UP VISIT: CPT | Performed by: PHYSICIAN ASSISTANT

## 2024-06-28 NOTE — PATIENT INSTRUCTIONS
May discontinue sling.  Begin range of motion of the left shoulder but no lifting pushing or pulling greater than 1 pound with the left arm.  May shower and get the incision wet.  Follow-up in 4 weeks with Dr. Sears for x-rays

## 2024-06-28 NOTE — PROGRESS NOTES
Date of surgery:   6/12/2024  Surgeon: Dr. Sears    History:  Mr. Tuan Lea is here in follow up regarding his left proximal humerus fracture ORIF.  He states that he stopped taking the pain medication about 10 days ago.  He states that he does try to use the arm a little bit but it still feels weak.    Physical:  Vitals:    06/28/24 1046   Pulse: 60   Temp: 97.7 °F (36.5 °C)   SpO2: 98%      He demonstrates appropriate mood and affect.   Surgical incision is well-healed, no erythema.  Full range of motion of the elbow and the wrist.    Imaging studies:  3 views of the left shoulder taken and reviewed in the office today show short lateral plate over the anterior lateral proximal humerus fixating a comminuted proximal humerus fracture with good alignment of the fracture.  No evidence of screws within the joint or hardware failure.    Impression: Status post above, doing well       Plan:   Patient Instructions   May discontinue sling.  Begin range of motion of the left shoulder but no lifting pushing or pulling greater than 1 pound with the left arm.  May shower and get the incision wet.  Follow-up in 4 weeks with Dr. Sears for x-rays

## 2024-06-28 NOTE — PROGRESS NOTES
Patient seen in office today for: 2 wk post Left shoulder ORIF    DOI:05/25/24  DOS:6/12/24  No new falls or injuries     Patient reports  2 /10 pain.  RICE and medication are effective to alleviate pain and reduce swelling.   Pain also alleviated by: OTC med  Pain worsened by: Patient reports painful ROM & weight bearing.     Patient needs to start PT (physical therapy)    Sutures removed in office today. Incision is well approximated, clean, dry and intact. Patient tolerated well. No foul odor or drainage.     X rays performed in office today.

## 2024-07-03 ENCOUNTER — HOSPITAL ENCOUNTER (OUTPATIENT)
Dept: INFUSION THERAPY | Age: 74
Setting detail: INFUSION SERIES
Discharge: HOME | End: 2024-07-03
Payer: MEDICARE

## 2024-07-03 VITALS
RESPIRATION RATE: 16 BRPM | TEMPERATURE: 97.2 F | HEART RATE: 62 BPM | SYSTOLIC BLOOD PRESSURE: 124 MMHG | DIASTOLIC BLOOD PRESSURE: 69 MMHG | OXYGEN SATURATION: 98 %

## 2024-07-03 DIAGNOSIS — K50.814 CROHN'S DISEASE OF BOTH SMALL AND LARGE INTESTINE WITH ABSCESS (HCC): Primary | ICD-10-CM

## 2024-07-03 PROCEDURE — 2580000003 HC RX 258: Performed by: SPECIALIST

## 2024-07-03 PROCEDURE — 6360000002 HC RX W HCPCS: Performed by: SPECIALIST

## 2024-07-03 PROCEDURE — 96365 THER/PROPH/DIAG IV INF INIT: CPT

## 2024-07-03 RX ORDER — SODIUM CHLORIDE 0.9 % (FLUSH) 0.9 %
5-40 SYRINGE (ML) INJECTION PRN
OUTPATIENT
Start: 2024-08-28

## 2024-07-03 RX ORDER — SODIUM CHLORIDE 0.9 % (FLUSH) 0.9 %
5-40 SYRINGE (ML) INJECTION PRN
Status: DISCONTINUED | OUTPATIENT
Start: 2024-07-03 | End: 2024-07-04 | Stop reason: HOSPADM

## 2024-07-03 RX ADMIN — VEDOLIZUMAB 300 MG: 300 INJECTION, POWDER, LYOPHILIZED, FOR SOLUTION INTRAVENOUS at 09:40

## 2024-07-03 RX ADMIN — SODIUM CHLORIDE, PRESERVATIVE FREE 30 ML: 5 INJECTION INTRAVENOUS at 10:17

## 2024-07-03 RX ADMIN — SODIUM CHLORIDE, PRESERVATIVE FREE 10 ML: 5 INJECTION INTRAVENOUS at 09:38

## 2024-07-03 NOTE — PROGRESS NOTES
Tolerated infusion well. Reviewed discharge instruction, voiced understanding. Copies of AVS given. Pt discharged home. Pt to exit via steady gait.    Orders Placed This Encounter   Medications    vedolizumab (ENTYVIO) 300 mg in sodium chloride 0.9 % 250 mL infusion    sodium chloride flush 0.9 % injection 5-40 mL

## 2024-07-11 ENCOUNTER — OFFICE VISIT (OUTPATIENT)
Dept: GASTROENTEROLOGY | Age: 74
End: 2024-07-11
Payer: MEDICARE

## 2024-07-11 VITALS
BODY MASS INDEX: 26.56 KG/M2 | DIASTOLIC BLOOD PRESSURE: 72 MMHG | SYSTOLIC BLOOD PRESSURE: 134 MMHG | HEIGHT: 67 IN | OXYGEN SATURATION: 96 % | TEMPERATURE: 97.2 F | WEIGHT: 169.2 LBS | HEART RATE: 69 BPM

## 2024-07-11 DIAGNOSIS — K50.814 CROHN'S DISEASE OF BOTH SMALL AND LARGE INTESTINE WITH ABSCESS (HCC): Primary | ICD-10-CM

## 2024-07-11 PROCEDURE — G8417 CALC BMI ABV UP PARAM F/U: HCPCS | Performed by: SPECIALIST

## 2024-07-11 PROCEDURE — 1036F TOBACCO NON-USER: CPT | Performed by: SPECIALIST

## 2024-07-11 PROCEDURE — 1124F ACP DISCUSS-NO DSCNMKR DOCD: CPT | Performed by: SPECIALIST

## 2024-07-11 PROCEDURE — 3017F COLORECTAL CA SCREEN DOC REV: CPT | Performed by: SPECIALIST

## 2024-07-11 PROCEDURE — G8427 DOCREV CUR MEDS BY ELIG CLIN: HCPCS | Performed by: SPECIALIST

## 2024-07-11 PROCEDURE — 3075F SYST BP GE 130 - 139MM HG: CPT | Performed by: SPECIALIST

## 2024-07-11 PROCEDURE — 99214 OFFICE O/P EST MOD 30 MIN: CPT | Performed by: SPECIALIST

## 2024-07-11 PROCEDURE — 3078F DIAST BP <80 MM HG: CPT | Performed by: SPECIALIST

## 2024-07-11 NOTE — PROGRESS NOTES
Gastroenterology Office Note            Navi Chaves MD      Subjective:      Patient ID:      Tuan Lea                 1950    CC: 4  month follow up for Crohn's Disease    HPI:   Doing well- reports about 3/day- no blood. Some weight loss since fractured humerus.  The patient denies abdominal pain,nausea,vomiting, diarrhea, constipation,melena, hematochezia,hematemesis or dysphagia.        Review of Systems:    see HPI for positives and pertinent negatives. All other systems reviewed and are negative     Objective:   PHYSICAL EXAM:    Vitals:  /72 (Site: Right Upper Arm, Position: Sitting, Cuff Size: Medium Adult)   Pulse 69   Temp 97.2 °F (36.2 °C) (Infrared)   Ht 1.702 m (5' 7.01\")   Wt 76.7 kg (169 lb 3.2 oz)   SpO2 96%   BMI 26.49 kg/m²   CONSTITUTIONAL: alert    LUNGS:  clear to auscultation  CARDIOVASCULAR:  regular rate and rhythm and no murmur noted  ABDOMEN:  normal bowel sounds, non-distended, non-tender and no masses palpated, no hepatosplenomegaly  EXTREMITIES: no clubbing, cyanosis, or edema    Assessment:      1) Crohn's ileocolitis- S/P extensive resection and ileocolic anastamosis with chronic stricture  2) 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

## 2024-07-23 LAB
C REACTIVE PROTEIN (CRP), BODY FLUID: 0.31 MG/DL
VITAMIN D 25-HYDROXY: 53 NG/ML (ref 30–100)

## 2024-07-26 ASSESSMENT — PROMIS GLOBAL HEALTH SCALE
IN GENERAL, PLEASE RATE HOW WELL YOU CARRY OUT YOUR USUAL SOCIAL ACTIVITIES (INCLUDES ACTIVITIES AT HOME, AT WORK, AND IN YOUR COMMUNITY, AND RESPONSIBILITIES AS A PARENT, CHILD, SPOUSE, EMPLOYEE, FRIEND, ETC) [ON A SCALE OF 1 (POOR) TO 5 (EXCELLENT)]?: FAIR
IN GENERAL, HOW WOULD YOU RATE YOUR PHYSICAL HEALTH [ON A SCALE OF 1 (POOR) TO 5 (EXCELLENT)]?: FAIR
IN GENERAL, PLEASE RATE HOW WELL YOU CARRY OUT YOUR USUAL SOCIAL ACTIVITIES (INCLUDES ACTIVITIES AT HOME, AT WORK, AND IN YOUR COMMUNITY, AND RESPONSIBILITIES AS A PARENT, CHILD, SPOUSE, EMPLOYEE, FRIEND, ETC) [ON A SCALE OF 1 (POOR) TO 5 (EXCELLENT)]?: FAIR
IN THE PAST 7 DAYS, HOW OFTEN HAVE YOU BEEN BOTHERED BY EMOTIONAL PROBLEMS, SUCH AS FEELING ANXIOUS, DEPRESSED, OR IRRITABLE [ON A SCALE FROM 1 (NEVER) TO 5 (ALWAYS)]?: SOMETIMES
IN THE PAST 7 DAYS, HOW WOULD YOU RATE YOUR PAIN ON AVERAGE [ON A SCALE FROM 0 (NO PAIN) TO 10 (WORST IMAGINABLE PAIN)]?: 3
HOW IS THE PROMIS V1.1 BEING ADMINISTERED?: ELECTRONIC
HOW IS THE PROMIS V1.1 BEING ADMINISTERED?: ELECTRONIC
SUM OF RESPONSES TO QUESTIONS 2, 4, 5, & 10: 12
IN GENERAL, WOULD YOU SAY YOUR QUALITY OF LIFE IS...[ON A SCALE OF 1 (POOR) TO 5 (EXCELLENT)]: GOOD
IN THE PAST 7 DAYS, HOW WOULD YOU RATE YOUR FATIGUE ON AVERAGE [ON A SCALE FROM 1 (NONE) TO 5 (VERY SEVERE)]?: MODERATE
IN GENERAL, HOW WOULD YOU RATE YOUR SATISFACTION WITH YOUR SOCIAL ACTIVITIES AND RELATIONSHIPS [ON A SCALE OF 1 (POOR) TO 5 (EXCELLENT)]?: GOOD
IN GENERAL, HOW WOULD YOU RATE YOUR MENTAL HEALTH, INCLUDING YOUR MOOD AND YOUR ABILITY TO THINK [ON A SCALE OF 1 (POOR) TO 5 (EXCELLENT)]?: GOOD
SUM OF RESPONSES TO QUESTIONS 3, 6, 7, & 8: 10
IN THE PAST 7 DAYS, HOW WOULD YOU RATE YOUR FATIGUE ON AVERAGE [ON A SCALE FROM 1 (NONE) TO 5 (VERY SEVERE)]?: MODERATE
TO WHAT EXTENT ARE YOU ABLE TO CARRY OUT YOUR EVERYDAY PHYSICAL ACTIVITIES SUCH AS WALKING, CLIMBING STAIRS, CARRYING GROCERIES, OR MOVING A CHAIR [ON A SCALE OF 1 (NOT AT ALL) TO 5 (COMPLETELY)]?: A LITTLE
IN GENERAL, HOW WOULD YOU RATE YOUR SATISFACTION WITH YOUR SOCIAL ACTIVITIES AND RELATIONSHIPS [ON A SCALE OF 1 (POOR) TO 5 (EXCELLENT)]?: GOOD
IN GENERAL, WOULD YOU SAY YOUR HEALTH IS...[ON A SCALE OF 1 (POOR) TO 5 (EXCELLENT)]: FAIR
WHO IS THE PERSON COMPLETING THE PROMIS V1.1 SURVEY?: SELF
IN THE PAST 7 DAYS, HOW WOULD YOU RATE YOUR PAIN ON AVERAGE [ON A SCALE FROM 0 (NO PAIN) TO 10 (WORST IMAGINABLE PAIN)]?: 3
TO WHAT EXTENT ARE YOU ABLE TO CARRY OUT YOUR EVERYDAY PHYSICAL ACTIVITIES SUCH AS WALKING, CLIMBING STAIRS, CARRYING GROCERIES, OR MOVING A CHAIR [ON A SCALE OF 1 (NOT AT ALL) TO 5 (COMPLETELY)]?: A LITTLE
WHO IS THE PERSON COMPLETING THE PROMIS V1.1 SURVEY?: SELF
IN THE PAST 7 DAYS, HOW OFTEN HAVE YOU BEEN BOTHERED BY EMOTIONAL PROBLEMS, SUCH AS FEELING ANXIOUS, DEPRESSED, OR IRRITABLE [ON A SCALE FROM 1 (NEVER) TO 5 (ALWAYS)]?: SOMETIMES

## 2024-07-29 ENCOUNTER — OFFICE VISIT (OUTPATIENT)
Dept: ORTHOPEDIC SURGERY | Age: 74
End: 2024-07-29

## 2024-07-29 VITALS — HEART RATE: 51 BPM | OXYGEN SATURATION: 99 % | BODY MASS INDEX: 26.5 KG/M2 | RESPIRATION RATE: 16 BRPM | HEIGHT: 67 IN

## 2024-07-29 DIAGNOSIS — Z87.81 S/P ORIF (OPEN REDUCTION INTERNAL FIXATION) FRACTURE: Primary | ICD-10-CM

## 2024-07-29 DIAGNOSIS — Z98.890 S/P ORIF (OPEN REDUCTION INTERNAL FIXATION) FRACTURE: Primary | ICD-10-CM

## 2024-07-29 PROCEDURE — 99024 POSTOP FOLLOW-UP VISIT: CPT | Performed by: ORTHOPAEDIC SURGERY

## 2024-07-29 ASSESSMENT — ENCOUNTER SYMPTOMS
ABDOMINAL PAIN: 0
EYE REDNESS: 0
SHORTNESS OF BREATH: 0
COLOR CHANGE: 0

## 2024-07-29 NOTE — PATIENT INSTRUCTIONS
May start weight-bearing as tolerated.  Continue range of motion exercises as instructed.  Ice and elevate as needed.  Tylenol or Motrin for pain.   Follow up in 6 weeks.    We are committed to providing you the best care possible.     If you receive a survey after visiting one of our offices, please take time to share your experience concerning your physician office visit.  These surveys are confidential and no health information about you is shared.     We are eager to improve for you and we are counting on your feedback to help make that happen.

## 2024-07-29 NOTE — PROGRESS NOTES
Patient seen in office today for 6wk post op ORIF LT Humerus DOS 6/12/24. X-rays taken. Stated he is making progress. 2/10 pain level. Stated he does have some aching pain.

## 2024-07-29 NOTE — PROGRESS NOTES
Subjective   Patient ID: Tuan Lea is a 73 y.o. male.      Patient seen in office today for 6wk post op ORIF LT Humerus DOS 6/12/24. X-rays taken. Stated he is making progress. 2/10 pain level. Stated he does have some aching pain.     He comes in today for his 6-week postop recheck.  Overall he is feeling great and is not having much pain in the left shoulder.  He has been working on range of motion on his own and states that his motion is almost back to normal.  Patient denies any new injury to the involved extremity/ joint, denies numbness or tingling in the involved extremity and denies fever or chills.            Review of Systems   Constitutional:  Negative for activity change, chills and fever.   HENT:  Negative for congestion and drooling.    Eyes:  Negative for redness.   Respiratory:  Negative for shortness of breath.    Cardiovascular:  Negative for chest pain.   Gastrointestinal:  Negative for abdominal pain.   Endocrine: Negative for cold intolerance and heat intolerance.   Musculoskeletal:  Positive for myalgias. Negative for arthralgias, gait problem and joint swelling.   Skin:  Negative for color change, pallor, rash and wound.   Neurological:  Negative for weakness and numbness.   Psychiatric/Behavioral:  Negative for confusion.      Past Medical History:   Diagnosis Date    Acute renal failure with tubular necrosis (HCC) 07/05/2021    Anxiety, generalized     Crohn's disease of small and large intestines with complication (HCC) 01/05/2021    Depression     GERD (gastroesophageal reflux disease)     Hepatitis     hepatitis when a child    High cholesterol     Hx of echocardiogram 06/22/2022    No significant valvular disease noted. Dilation of ascending aorta(3.8cm) . Normal diastolic filling pattern for age.    Liver disease     Primary hypertension 08/18/2022    Psychiatric problem             Objective   Physical Exam  Constitutional:       Appearance: He is well-developed.   HENT:

## 2024-08-21 RX ORDER — SODIUM CHLORIDE, SODIUM LACTATE, POTASSIUM CHLORIDE, CALCIUM CHLORIDE 600; 310; 30; 20 MG/100ML; MG/100ML; MG/100ML; MG/100ML
INJECTION, SOLUTION INTRAVENOUS CONTINUOUS
Status: CANCELLED | OUTPATIENT
Start: 2024-08-28

## 2024-08-21 NOTE — PROGRESS NOTES
Surgery @ [facility] on 08/28/24 you will be called 08/27/24 with times    NOTHING TO EAT OR DRINK AFTER MIDNIGHT DAY OF SURGERY    1. Enter thru the hospital main entrance on day of surgery, check in at the Information Desk. If you arrive prior to 6:00am, enter thru the ER entrance.    2. Follow the directions as prescribed by the doctor for your procedure and medications.         Morning of surgery take: Amlodipine, Coreg, Nexium with a sip of water         Stop vitamins, supplements and NSAIDS:  today.    3. Check with your Doctor regarding stopping blood thinners and follow their instructions.    4. Do not smoke, vape or use chewing tobacco morning of surgery. Do not drink any alcoholic beverages 24 hours prior to surgery.       This includes NA Beer. No street drugs 7 days prior to surgery.    5. If you have dentures, contacts of glasses they will be removed before going to the OR; please bring a case.    6. Please bring picture ID, insurance card, paperwork from the doctor’s office (H & P, Consent, & card for implantable devices).    7. Take a shower with an antibacterial soap the night before surgery and the morning of surgery. Do not put anything on your skin      After your morning shower.    8. You will need a responsible adult to drive you home and check on you after surgery.

## 2024-08-23 NOTE — H&P
I have examined the patient within 24 hours  before the procedure and there is no change in the previous history and physical exam,which has been reviewed.There is no history of sleep apnea, snoring, or stridor.  There has been no  previous adverse experience with sedation/anesthesia. There is no increased risk for aspiration of gastric contents.  The patient has been instructed that all resuscitative measures (during the operative and immediate perioperative period) will be instituted in the unlikely event that they will be needed.The patient has no pertinent past surgical or FH other than listed in the original H&P.     ASA Class: 3  AIRWAY Class: 1     Consent form signed, witnessed and in soft chart

## 2024-08-27 ENCOUNTER — HOSPITAL ENCOUNTER (OUTPATIENT)
Dept: INFUSION THERAPY | Age: 74
Setting detail: INFUSION SERIES
Discharge: HOME | End: 2024-08-27
Payer: MEDICARE

## 2024-08-27 ENCOUNTER — ANESTHESIA EVENT (OUTPATIENT)
Dept: ENDOSCOPY | Age: 74
End: 2024-08-27
Payer: MEDICARE

## 2024-08-27 VITALS
RESPIRATION RATE: 16 BRPM | TEMPERATURE: 97.5 F | OXYGEN SATURATION: 97 % | DIASTOLIC BLOOD PRESSURE: 64 MMHG | SYSTOLIC BLOOD PRESSURE: 133 MMHG | HEART RATE: 56 BPM

## 2024-08-27 DIAGNOSIS — K50.814 CROHN'S DISEASE OF BOTH SMALL AND LARGE INTESTINE WITH ABSCESS (HCC): Primary | ICD-10-CM

## 2024-08-27 PROCEDURE — 96365 THER/PROPH/DIAG IV INF INIT: CPT

## 2024-08-27 PROCEDURE — 2580000003 HC RX 258: Performed by: SPECIALIST

## 2024-08-27 PROCEDURE — 6360000002 HC RX W HCPCS: Performed by: SPECIALIST

## 2024-08-27 RX ORDER — SODIUM CHLORIDE 0.9 % (FLUSH) 0.9 %
5-40 SYRINGE (ML) INJECTION PRN
OUTPATIENT
Start: 2024-10-22

## 2024-08-27 RX ORDER — SODIUM CHLORIDE 0.9 % (FLUSH) 0.9 %
5-40 SYRINGE (ML) INJECTION PRN
Status: DISCONTINUED | OUTPATIENT
Start: 2024-08-27 | End: 2024-08-28 | Stop reason: HOSPADM

## 2024-08-27 RX ADMIN — VEDOLIZUMAB 300 MG: 300 INJECTION, POWDER, LYOPHILIZED, FOR SOLUTION INTRAVENOUS at 10:00

## 2024-08-27 RX ADMIN — SODIUM CHLORIDE, PRESERVATIVE FREE 10 ML: 5 INJECTION INTRAVENOUS at 10:39

## 2024-08-27 RX ADMIN — SODIUM CHLORIDE, PRESERVATIVE FREE 10 ML: 5 INJECTION INTRAVENOUS at 09:55

## 2024-08-27 ASSESSMENT — LIFESTYLE VARIABLES: SMOKING_STATUS: 1

## 2024-08-27 ASSESSMENT — ENCOUNTER SYMPTOMS: SHORTNESS OF BREATH: 1

## 2024-08-27 NOTE — ANESTHESIA PRE PROCEDURE
Department of Anesthesiology  Preprocedure Note       Name:  Tuan Lea   Age:  74 y.o.  :  1950                                          MRN:  [id number]         Date:  2024      Surgeon: Surgeon(s):  Navi Chaves MD    Procedure: Procedure(s):  ANAL PROCTO SIGMOIDOSCOPY FLEXIBLE    Medications prior to admission:   Prior to Admission medications    Medication Sig Start Date End Date Taking? Authorizing Provider   diphenoxylate-atropine (LOMOTIL) 2.5-0.025 MG per tablet Take 1 tablet by mouth 4 times daily for 360 days. Max Daily Amount: 4 tablets 24  Navi Chaves MD   losartan (COZAAR) 50 MG tablet Take 1 tablet by mouth daily    Madison Saldivar MD   sertraline (ZOLOFT) 100 MG tablet  16   Madison Saldivar MD   vitamin D (ERGOCALCIFEROL) 1.25 MG (29366 UT) CAPS capsule Take 1 capsule by mouth Twice a Week 23   Navi Chaves MD   vedolizumab (ENTYVIO) 300 MG injection Infuse intravenously Every 8 weeks    Madison Saldivar MD   Esomeprazole Magnesium (NEXIUM 24HR PO) Take 20 mg by mouth daily    Madison Saldivar MD   fenofibrate (TRICOR) 145 MG tablet Take 1 tablet by mouth daily    Madison Saldivar MD   carvedilol (COREG) 3.125 MG tablet Take 1 tablet by mouth 2 times daily (with meals)    Madison Saldivar MD   amLODIPine (NORVASC) 10 MG tablet Take 1 tablet by mouth daily 23   Latrice Radford MD   prednisoLONE acetate (PRED FORTE) 1 % ophthalmic suspension Place 1 drop into the left eye three times a week    Madison Saldivar MD   Multiple Vitamins-Iron (MULTI-VITAMIN/IRON PO) Take by mouth    Madison Saldivar MD   folic acid (FOLVITE) 1 MG tablet Take 1 tablet by mouth daily    Madison Saldivar MD       Current medications:    No current facility-administered medications for this encounter.     Current Outpatient Medications   Medication Sig Dispense Refill   • diphenoxylate-atropine (LOMOTIL) 2.5-0.025 MG per  bradycardia  Nonspecific ST abnormality  Abnormal ECG  When compared with ECG of 25-JUN-2023 17:53,  Vent. rate has decreased BY  55 BPM  (RBBB and left anterior fascicular block) is no longer present  Confirmed by HELENA BAH ([id number]) on 6/28/2023 7:14:43 PM         Neuro/Psych:   (+) depression/anxiety             GI/Hepatic/Renal:   (+) GERD:, liver disease:, renal disease: CRI, bowel prep         ROS comment: Chrons   Short gut .   Endo/Other: Negative Endo/Other ROS                    Abdominal: normal exam            Vascular: negative vascular ROS.         Other Findings:         Anesthesia Plan      MAC     ASA 3     (Chart Review-8/27/2024)  Induction: intravenous.                          GUMARO Root - CRNA   8/27/2024       Pre Anesthesia Assessment complete. Chart reviewed on 8/27/2024

## 2024-08-27 NOTE — PROGRESS NOTES
Spoke with patient and he will arrive at 1230 at [facility] on 8/28/2024 for his procedure at 1400. Orders are in [facility].

## 2024-08-27 NOTE — DISCHARGE INSTRUCTIONS
Notify your doctor for:   Rash, hives, itching, or blistered or peeling skin   Signs of infection- fever, chills   Dark urine, light colored stools, yellow skin or eyes.   If your symptoms worsen after you receive treatment,  call your doctor.    Go to the ER for:   Shortness of Breath   Chest pain    Thank you for choosing [facility].  It is a pleasure to serve you.    Outpatient Infusion Unit  [phone]

## 2024-08-28 ENCOUNTER — ANESTHESIA (OUTPATIENT)
Dept: ENDOSCOPY | Age: 74
End: 2024-08-28
Payer: MEDICARE

## 2024-08-28 ENCOUNTER — HOSPITAL ENCOUNTER (OUTPATIENT)
Age: 74
Setting detail: OUTPATIENT SURGERY
Discharge: HOME | End: 2024-08-28
Attending: SPECIALIST | Admitting: SPECIALIST
Payer: MEDICARE

## 2024-08-28 VITALS
TEMPERATURE: 97.6 F | SYSTOLIC BLOOD PRESSURE: 129 MMHG | OXYGEN SATURATION: 95 % | HEIGHT: 67 IN | DIASTOLIC BLOOD PRESSURE: 69 MMHG | RESPIRATION RATE: 16 BRPM | BODY MASS INDEX: 26.06 KG/M2 | HEART RATE: 67 BPM | WEIGHT: 166 LBS

## 2024-08-28 DIAGNOSIS — Z90.49 S/P COLECTOMY: ICD-10-CM

## 2024-08-28 DIAGNOSIS — K50.812 CROHN'S DISEASE OF BOTH SMALL AND LARGE INTESTINE WITH INTESTINAL OBSTRUCTION (HCC): ICD-10-CM

## 2024-08-28 PROCEDURE — 7100000011 HC PHASE II RECOVERY - ADDTL 15 MIN: Performed by: SPECIALIST

## 2024-08-28 PROCEDURE — 2580000003 HC RX 258: Performed by: NURSE ANESTHETIST, CERTIFIED REGISTERED

## 2024-08-28 PROCEDURE — 2500000003 HC RX 250 WO HCPCS: Performed by: NURSE ANESTHETIST, CERTIFIED REGISTERED

## 2024-08-28 PROCEDURE — 2709999900 HC NON-CHARGEABLE SUPPLY: Performed by: SPECIALIST

## 2024-08-28 PROCEDURE — 88305 TISSUE EXAM BY PATHOLOGIST: CPT

## 2024-08-28 PROCEDURE — 3609008300 HC SIGMOIDOSCOPY W/BIOPSY SINGLE/MULTIPLE: Performed by: SPECIALIST

## 2024-08-28 PROCEDURE — 3700000001 HC ADD 15 MINUTES (ANESTHESIA): Performed by: SPECIALIST

## 2024-08-28 PROCEDURE — 3700000000 HC ANESTHESIA ATTENDED CARE: Performed by: SPECIALIST

## 2024-08-28 PROCEDURE — 45331 SIGMOIDOSCOPY AND BIOPSY: CPT | Performed by: SPECIALIST

## 2024-08-28 PROCEDURE — 6360000002 HC RX W HCPCS: Performed by: NURSE ANESTHETIST, CERTIFIED REGISTERED

## 2024-08-28 PROCEDURE — 7100000010 HC PHASE II RECOVERY - FIRST 15 MIN: Performed by: SPECIALIST

## 2024-08-28 RX ORDER — HALOPERIDOL 5 MG/ML
1 INJECTION INTRAMUSCULAR
Status: CANCELLED | OUTPATIENT
Start: 2024-08-28 | End: 2024-08-29

## 2024-08-28 RX ORDER — LIDOCAINE HYDROCHLORIDE 20 MG/ML
INJECTION, SOLUTION INFILTRATION; PERINEURAL PRN
Status: DISCONTINUED | OUTPATIENT
Start: 2024-08-28 | End: 2024-08-28 | Stop reason: SDUPTHER

## 2024-08-28 RX ORDER — SODIUM CHLORIDE, SODIUM LACTATE, POTASSIUM CHLORIDE, CALCIUM CHLORIDE 600; 310; 30; 20 MG/100ML; MG/100ML; MG/100ML; MG/100ML
INJECTION, SOLUTION INTRAVENOUS CONTINUOUS
Status: CANCELLED | OUTPATIENT
Start: 2024-08-28

## 2024-08-28 RX ORDER — PROPOFOL 10 MG/ML
INJECTION, EMULSION INTRAVENOUS PRN
Status: DISCONTINUED | OUTPATIENT
Start: 2024-08-28 | End: 2024-08-28 | Stop reason: SDUPTHER

## 2024-08-28 RX ORDER — ONDANSETRON 2 MG/ML
4 INJECTION INTRAMUSCULAR; INTRAVENOUS
Status: CANCELLED | OUTPATIENT
Start: 2024-08-28 | End: 2024-08-29

## 2024-08-28 RX ORDER — GLYCOPYRROLATE 0.2 MG/ML
INJECTION INTRAMUSCULAR; INTRAVENOUS PRN
Status: DISCONTINUED | OUTPATIENT
Start: 2024-08-28 | End: 2024-08-28 | Stop reason: SDUPTHER

## 2024-08-28 RX ORDER — SODIUM CHLORIDE 0.9 % (FLUSH) 0.9 %
5-40 SYRINGE (ML) INJECTION PRN
Status: CANCELLED | OUTPATIENT
Start: 2024-08-28

## 2024-08-28 RX ORDER — SODIUM CHLORIDE, SODIUM LACTATE, POTASSIUM CHLORIDE, CALCIUM CHLORIDE 600; 310; 30; 20 MG/100ML; MG/100ML; MG/100ML; MG/100ML
INJECTION, SOLUTION INTRAVENOUS CONTINUOUS PRN
Status: DISCONTINUED | OUTPATIENT
Start: 2024-08-28 | End: 2024-08-28 | Stop reason: SDUPTHER

## 2024-08-28 RX ORDER — SODIUM CHLORIDE 0.9 % (FLUSH) 0.9 %
5-40 SYRINGE (ML) INJECTION EVERY 12 HOURS SCHEDULED
Status: CANCELLED | OUTPATIENT
Start: 2024-08-28

## 2024-08-28 RX ORDER — SODIUM CHLORIDE 9 MG/ML
INJECTION, SOLUTION INTRAVENOUS PRN
Status: CANCELLED | OUTPATIENT
Start: 2024-08-28

## 2024-08-28 RX ORDER — LABETALOL HYDROCHLORIDE 5 MG/ML
10 INJECTION, SOLUTION INTRAVENOUS
Status: CANCELLED | OUTPATIENT
Start: 2024-08-28

## 2024-08-28 RX ADMIN — PROPOFOL 100 MG: 10 INJECTION, EMULSION INTRAVENOUS at 13:14

## 2024-08-28 RX ADMIN — LIDOCAINE HYDROCHLORIDE 100 MG: 20 INJECTION, SOLUTION INFILTRATION; PERINEURAL at 13:14

## 2024-08-28 RX ADMIN — PHENYLEPHRINE HYDROCHLORIDE 100 MCG: 10 INJECTION INTRAVENOUS at 13:28

## 2024-08-28 RX ADMIN — PROPOFOL 50 MG: 10 INJECTION, EMULSION INTRAVENOUS at 13:19

## 2024-08-28 RX ADMIN — SODIUM CHLORIDE, POTASSIUM CHLORIDE, SODIUM LACTATE AND CALCIUM CHLORIDE: 600; 310; 30; 20 INJECTION, SOLUTION INTRAVENOUS at 13:04

## 2024-08-28 RX ADMIN — PROPOFOL 50 MG: 10 INJECTION, EMULSION INTRAVENOUS at 13:18

## 2024-08-28 RX ADMIN — PROPOFOL 50 MG: 10 INJECTION, EMULSION INTRAVENOUS at 13:16

## 2024-08-28 RX ADMIN — PROPOFOL 50 MG: 10 INJECTION, EMULSION INTRAVENOUS at 13:33

## 2024-08-28 RX ADMIN — GLYCOPYRROLATE 0.2 MG: 0.2 INJECTION INTRAMUSCULAR; INTRAVENOUS at 13:08

## 2024-08-28 ASSESSMENT — PAIN - FUNCTIONAL ASSESSMENT
PAIN_FUNCTIONAL_ASSESSMENT: 0-10
PAIN_FUNCTIONAL_ASSESSMENT: 0-10

## 2024-08-28 NOTE — PROGRESS NOTES
1425- Discharge instructions given and reviewed. Voiced understanding. Ambulated to bathroom with assistance. Gait steady.   1432- To car per wheelchair. Home with friend Slim

## 2024-08-28 NOTE — PROGRESS NOTES
Returned to room 19. Report received from Angelica MANRIQUE. Alert and oriented. Respirations even and unlabored. Color pink. Abdomen soft and nontender. Beverage provided. Call light in reach.

## 2024-08-28 NOTE — BRIEF OP NOTE
BRIEF SIGMOIDOSCOPY REPORT:   The original sigmoidoscopy report with photos can be found by going to \"chart review\" then \"notes\" then \"sigmoidoscopy\" then \"scan....\"    Impression:         - S/P subtotal colectomy with end-to-side ileo-sigmoid anastamosis         - scope passed 10 cm into rufina-terminal ileum- no active Crohn's to that point         - two areas of apparent granulation tissue near the anastamosis-- both            biopsied         - small grade I internal hemorrhoids         -  No specimens collected.  Recommendation:         - continue current Rx

## 2024-08-28 NOTE — DISCHARGE INSTRUCTIONS
COLONOSCOPY    DR. Chaves     OFFICE NUMBER [phone]    FOLLOW UP APPOINTMENT: call office in one week for pathology results     REPEAT PROCEDURE to be determined by pathology results     TEST ORDERED: NONE     What to expect at home:  Your Recovery   Your doctor will tell you when you can eat and do your other usual activities Your doctor will talk to you about when you will need your next colonoscopy. Your doctor can help you decide how often you need to be checked. This will depend on the results of your test and your risk for colorectal cancer.  After the test, you may be bloated or have gas pains. You may need to pass gas. If a biopsy was done or a polyp was removed, you may have streaks of blood in your stool (feces) for a few days.  This care sheet gives you a general idea about how long it will take for you to recover. But each person recovers at a different pace. Follow the steps below to get better as quickly as possible.  How can you care for yourself at home?  Activity  Rest when you feel tired.  Diet  Follow your doctor's directions for eating.  Unless your doctor has told you not to, drink plenty of fluids. This helps to replace the fluids that were lost during the colon prep.  DO NOT DRINK ALCOHOL.  Medicines  Your doctor will tell you if and when you can restart your medicines. He or she will also give you instructions about taking any new medicines.  If you take blood thinners, such as warfarin (Coumadin), clopidogrel (Plavix), or aspirin, be sure to talk to your doctor. He or she will tell you if and when to start taking those medicines again. Make sure that you understand exactly what your doctor wants you to do.  If polyps were removed or a biopsy was done during the test, your doctor may tell you not to take aspirin or other anti-inflammatory medicines for a few days. These include ibuprofen (Advil, Motrin) and naproxen (Aleve).  Other instructions: Anethesia  For your safety, do not drive or  not drive, work around machines or use equipment.  Do not drink any alcoholic beverages.  Do not smoke while alone.  Avoid making important decisions.  Plan to spend a quiet, relaxed evening @ home.  Resume normal activities as you begin to feel better.  Eat lightly for your first meal, then gradually increase your diet to what is normal for you.  In case of nausea, avoid food and drink only clear liquids.  Resume food as nausea ceases.  Notify your surgeon if you experience fever, chills, large amount of bleeding, difficulty breathing, persistent nausea and vomiting or any other disturbing problem.  Call for a follow-up appointment with your surgeon.

## 2024-08-28 NOTE — ANESTHESIA POSTPROCEDURE EVALUATION
Department of Anesthesiology  Postprocedure Note    Patient: Tuan Lea  MRN: [id number]  YOB: 1950  Date of evaluation: 8/28/2024    Procedure Summary       Date: 08/28/24 Room / Location: [place] [address] / [facility]    Anesthesia Start: 1308 Anesthesia Stop: 1351    Procedure: SIGMOIDOSCOPY BIOPSY FLEXIBLE Diagnosis:       Crohn's disease of both small and large intestine with intestinal obstruction (HCC)      S/P colectomy      (Crohn's disease of both small and large intestine with intestinal obstruction (HCC) [K50.812])      (S/P colectomy [Z90.49])    Surgeons: Navi Chaves MD Responsible Provider: Mayank Morgan MD    Anesthesia Type: MAC ASA Status: 3            Anesthesia Type: No value filed.    Makenna Phase I: Makenna Score: 10    Makenna Phase II:      Anesthesia Post Evaluation    Patient location during evaluation: bedside  Patient participation: complete - patient participated  Level of consciousness: awake  Pain score: 0  Airway patency: patent  Nausea & Vomiting: no vomiting and no nausea  Cardiovascular status: blood pressure returned to baseline  Respiratory status: acceptable  Hydration status: euvolemic  Pain management: adequate    No notable events documented.

## 2024-09-09 ENCOUNTER — OFFICE VISIT (OUTPATIENT)
Dept: ORTHOPEDIC SURGERY | Age: 74
End: 2024-09-09

## 2024-09-09 VITALS — TEMPERATURE: 97.6 F | HEART RATE: 55 BPM | OXYGEN SATURATION: 98 %

## 2024-09-09 DIAGNOSIS — S42.202D CLOSED FRACTURE OF PROXIMAL END OF LEFT HUMERUS WITH ROUTINE HEALING, UNSPECIFIED FRACTURE MORPHOLOGY, SUBSEQUENT ENCOUNTER: Primary | ICD-10-CM

## 2024-09-09 PROCEDURE — 99024 POSTOP FOLLOW-UP VISIT: CPT | Performed by: PHYSICIAN ASSISTANT

## 2024-09-25 ENCOUNTER — HOSPITAL ENCOUNTER (OUTPATIENT)
Dept: PHYSICAL THERAPY | Age: 74
Setting detail: THERAPIES SERIES
Discharge: HOME | End: 2024-09-25
Payer: MEDICARE

## 2024-09-25 PROCEDURE — 97110 THERAPEUTIC EXERCISES: CPT

## 2024-09-25 PROCEDURE — 97161 PT EVAL LOW COMPLEX 20 MIN: CPT

## 2024-09-25 ASSESSMENT — PAIN DESCRIPTION - LOCATION: LOCATION: SHOULDER

## 2024-09-25 ASSESSMENT — PAIN DESCRIPTION - PAIN TYPE: TYPE: SURGICAL PAIN

## 2024-09-25 ASSESSMENT — PAIN SCALES - GENERAL: PAINLEVEL_OUTOF10: 1

## 2024-09-25 ASSESSMENT — PAIN DESCRIPTION - DESCRIPTORS: DESCRIPTORS: ACHING

## 2024-09-25 ASSESSMENT — PAIN DESCRIPTION - ORIENTATION: ORIENTATION: LEFT

## 2024-09-30 ENCOUNTER — HOSPITAL ENCOUNTER (OUTPATIENT)
Dept: PHYSICAL THERAPY | Age: 74
Setting detail: THERAPIES SERIES
Discharge: HOME | End: 2024-09-30
Payer: MEDICARE

## 2024-09-30 PROCEDURE — 97110 THERAPEUTIC EXERCISES: CPT

## 2024-09-30 PROCEDURE — 97140 MANUAL THERAPY 1/> REGIONS: CPT

## 2024-09-30 NOTE — FLOWSHEET NOTE
Outpatient Physical Therapy  [place]           [x] Phone: [phone]   Fax: [phone]  [place]           [] Phone: [phone]   Fax: [phone]        Physical Therapy Daily Treatment Note  Date:  2024    Patient Name:  Tuan Lea    :  1950  MRN: [id number]  Restrictions/Precautions: No data recorded      Diagnosis:   Closed fracture of proximal end of left humerus with routine healing, unspecified fracture morphology, subsequent encounter []    Date of Injury/Surgery: 24  Treatment Diagnosis:     Insurance/Certification information:    Referring Physician:  Tuan Mendoza PA-C     PCP: Radha Fournier APRN - NP  Next Doctor Visit:  3 months   Plan of care signed (Y/N):  sent day of eval   Outcome Measure: QuickDash   Visit# / total visits:  / BOMN  Pain level: 1-2/10 With certain movements  Goals:     Patient goals: \"I just want to be able to move like I used to\"    Long Term Goals  Time Frame for Long Term Goals: 6 weeks  Patient will demonstrate independence with HEP in order to promote independent management of symptoms.  Patient will improve QuickDash from 16% to <10% in order to demonstrate improved functional ability.  Patient will demonstrate gross left shoulder ROM WFL in comparison to right shoulder ROM  Patient will report <1/10 pain during ADL/iADLs without use of pain medication.  Patient will demonstrate gross left shoulder strength to 4+/5 to allow him to perform hobbies at [facility].      Summary of Evaluation:    Patient  is a 74 year old male who presents to physical therapy 3 months post left humerus open reduction internal fixation surgery on 2024. Patient reports difficulties and pain with performing ADLs/iADLs and participating in hobbies (wood work, golf). Upon assessment, patient demonstrates decreased ROM and strength in left shoulder, as expected secondary to the nature of his surgery. Shadi would benefit from ongoing skilled

## 2024-10-02 ENCOUNTER — HOSPITAL ENCOUNTER (OUTPATIENT)
Dept: PHYSICAL THERAPY | Age: 74
Setting detail: INFUSION SERIES
Discharge: HOME | End: 2024-10-02
Payer: MEDICARE

## 2024-10-02 PROCEDURE — 97110 THERAPEUTIC EXERCISES: CPT

## 2024-10-02 PROCEDURE — 97140 MANUAL THERAPY 1/> REGIONS: CPT

## 2024-10-02 NOTE — FLOWSHEET NOTE
in left GHJ external rotation today. Pt continues to be most limited with overhead motion.  Progressed strengthening exercises, which pt tolerated well, c RTB provided to progress HEP. Shadi would benefit from ongoing skilled physical therapy to address deficits, return to PLOF, and reduce risk for further injury/decline in functional status.  End pain: 0/10    Plan for Next Session: Progress ROM and strength training as appropriate , PROM left shoulder in all directions       Time In / Time Out:   4407-0095             Timed Code/Total Treatment Minutes:  40/40, (2) TE, (1) MT      Next Progress Note due:  30 days from eval (9/25/24) or 10 visits       Plan of Care Interventions:  [x] Therapeutic Exercise  [x] Modalities:  [x] Therapeutic Activity     [] Ultrasound  [] Estim  [] Gait Training      [] Cervical Traction [] Lumbar Traction  [x] Neuromuscular Re-education    [x] Cold/hotpack [] Iontophoresis   [x] Instruction in HEP      [x] Vasopneumatic   [] Dry Needling    [x] Manual Therapy               [] Aquatic Therapy              Electronically signed by:  Linda Miller, PT, DPT 10/2/2024, 7:53 AM

## 2024-10-06 NOTE — PROGRESS NOTES
Prior to discharge, the After Visit Summary Discharge Instructions were reviewed with the patient.  He was offered a printed version of the AVS, but declined the offer. Recurrent appointment, pt tolerated infusion well. Pt discharged home. Pt to exit via steady gait.    Orders Placed This Encounter   Medications    vedolizumab (ENTYVIO) 300 mg in sodium chloride 0.9 % 250 mL infusion    sodium chloride flush 0.9 % injection 5-40 mL        Opt out

## 2024-10-07 ENCOUNTER — HOSPITAL ENCOUNTER (OUTPATIENT)
Dept: PHYSICAL THERAPY | Age: 74
Setting detail: INFUSION SERIES
Discharge: HOME | End: 2024-10-07
Payer: MEDICARE

## 2024-10-07 PROCEDURE — 97140 MANUAL THERAPY 1/> REGIONS: CPT

## 2024-10-07 PROCEDURE — 97110 THERAPEUTIC EXERCISES: CPT

## 2024-10-07 NOTE — FLOWSHEET NOTE
Outpatient Physical Therapy  [place]           [x] Phone: [phone]   Fax: [phone]  [place]           [] Phone: [phone]   Fax: [phone]        Physical Therapy Daily Treatment Note  Date:  10/7/2024    Patient Name:  Tuan Lea    :  1950  MRN: [id number]  Restrictions/Precautions: No data recorded      Diagnosis:   Closed fracture of proximal end of left humerus with routine healing, unspecified fracture morphology, subsequent encounter []    Date of Injury/Surgery: 24  Treatment Diagnosis:     Insurance/Certification information:    Referring Physician:  Tuan Mendoza PA-C     PCP: Radha Fournier APRN - NP  Next Doctor Visit:  3 months   Plan of care signed (Y/N):  sent day of eval   Outcome Measure: QuickDash   Visit# / total visits:   BOMN  Pain level: 0/10 currently  Goals:     Patient goals: \"I just want to be able to move like I used to\"    Long Term Goals  Time Frame for Long Term Goals: 6 weeks  Patient will demonstrate independence with HEP in order to promote independent management of symptoms.  Patient will improve QuickDash from 16% to <10% in order to demonstrate improved functional ability.  Patient will demonstrate gross left shoulder ROM WFL in comparison to right shoulder ROM  Patient will report <1/10 pain during ADL/iADLs without use of pain medication.  Patient will demonstrate gross left shoulder strength to 4+/5 to allow him to perform hobbies at [employer].      Summary of Evaluation:    Patient  is a 74 year old male who presents to physical therapy 3 months post left humerus open reduction internal fixation surgery on 2024. Patient reports difficulties and pain with performing ADLs/iADLs and participating in hobbies (wood work, golf). Upon assessment, patient demonstrates decreased ROM and strength in left shoulder, as expected secondary to the nature of his surgery. Shadi would benefit from ongoing skilled physical

## 2024-10-09 ENCOUNTER — HOSPITAL ENCOUNTER (OUTPATIENT)
Dept: PHYSICAL THERAPY | Age: 74
Setting detail: INFUSION SERIES
Discharge: HOME | End: 2024-10-09
Payer: MEDICARE

## 2024-10-09 PROCEDURE — 97110 THERAPEUTIC EXERCISES: CPT

## 2024-10-09 PROCEDURE — 97140 MANUAL THERAPY 1/> REGIONS: CPT

## 2024-10-09 NOTE — FLOWSHEET NOTE
Outpatient Physical Therapy  [place]           [x] Phone: [phone]   Fax: [phone]  [place]           [] Phone: [phone]   Fax: [phone]        Physical Therapy Daily Treatment Note  Date:  10/9/2024    Patient Name:  Tuan Lea    :  1950  MRN: [id number]  Restrictions/Precautions: No data recorded      Diagnosis:   Closed fracture of proximal end of left humerus with routine healing, unspecified fracture morphology, subsequent encounter []    Date of Injury/Surgery: 24  Treatment Diagnosis:     Insurance/Certification information:    Referring Physician:  Tuan Mendoza PA-C     PCP: Radha Fournier APRN - NP  Next Doctor Visit:  3 months   Plan of care signed (Y/N):  sent day of eval   Outcome Measure: QuickDash   Visit# / total visits:  / BOMN  Pain level: 0/10 currently  Goals:     Patient goals: \"I just want to be able to move like I used to\"    Long Term Goals  Time Frame for Long Term Goals: 6 weeks  Patient will demonstrate independence with HEP in order to promote independent management of symptoms.  Patient will improve QuickDash from 16% to <10% in order to demonstrate improved functional ability.  Patient will demonstrate gross left shoulder ROM WFL in comparison to right shoulder ROM  Patient will report <1/10 pain during ADL/iADLs without use of pain medication.  Patient will demonstrate gross left shoulder strength to 4+/5 to allow him to perform hobbies at [employer].      Summary of Evaluation:    Patient  is a 74 year old male who presents to physical therapy 3 months post left humerus open reduction internal fixation surgery on 2024. Patient reports difficulties and pain with performing ADLs/iADLs and participating in hobbies (wood work, golf). Upon assessment, patient demonstrates decreased ROM and strength in left shoulder, as expected secondary to the nature of his surgery. Shadi would benefit from ongoing skilled physical

## 2024-10-14 ENCOUNTER — HOSPITAL ENCOUNTER (OUTPATIENT)
Dept: PHYSICAL THERAPY | Age: 74
Setting detail: INFUSION SERIES
Discharge: HOME | End: 2024-10-14
Payer: MEDICARE

## 2024-10-14 PROCEDURE — 97140 MANUAL THERAPY 1/> REGIONS: CPT

## 2024-10-14 PROCEDURE — 97110 THERAPEUTIC EXERCISES: CPT

## 2024-10-14 NOTE — FLOWSHEET NOTE
Outpatient Physical Therapy  [place]           [x] Phone: [phone]   Fax: [phone]  [place]           [] Phone: [phone]   Fax: [phone]        Physical Therapy Daily Treatment Note  Date:  10/14/2024    Patient Name:  Tuan Lea    :  1950  MRN: [id number]  Restrictions/Precautions: No data recorded      Diagnosis:   Closed fracture of proximal end of left humerus with routine healing, unspecified fracture morphology, subsequent encounter []    Date of Injury/Surgery: 24  Treatment Diagnosis:     Insurance/Certification information:    Referring Physician:  Tuan Mendoza PA-C     PCP: Radha Fournier APRN - NP  Next Doctor Visit:  3 months   Plan of care signed (Y/N):  sent day of eval   Outcome Measure: QuickDash   Visit# / total visits:   BOMN  Pain level: 0/10 currently  Goals:     Patient goals: \"I just want to be able to move like I used to\"    Long Term Goals  Time Frame for Long Term Goals: 6 weeks  Patient will demonstrate independence with HEP in order to promote independent management of symptoms.  Patient will improve QuickDash from 16% to <10% in order to demonstrate improved functional ability.  Patient will demonstrate gross left shoulder ROM WFL in comparison to right shoulder ROM  Patient will report <1/10 pain during ADL/iADLs without use of pain medication.  Patient will demonstrate gross left shoulder strength to 4+/5 to allow him to perform hobbies at [employer].      Summary of Evaluation:    Patient  is a 74 year old male who presents to physical therapy 3 months post left humerus open reduction internal fixation surgery on 2024. Patient reports difficulties and pain with performing ADLs/iADLs and participating in hobbies (wood work, golf). Upon assessment, patient demonstrates decreased ROM and strength in left shoulder, as expected secondary to the nature of his surgery. Shadi would benefit from ongoing skilled physical

## 2024-10-16 ENCOUNTER — HOSPITAL ENCOUNTER (OUTPATIENT)
Dept: PHYSICAL THERAPY | Age: 74
Setting detail: INFUSION SERIES
Discharge: HOME | End: 2024-10-16
Payer: MEDICARE

## 2024-10-16 PROCEDURE — 97140 MANUAL THERAPY 1/> REGIONS: CPT

## 2024-10-16 PROCEDURE — 97110 THERAPEUTIC EXERCISES: CPT

## 2024-10-16 NOTE — FLOWSHEET NOTE
Outpatient Physical Therapy  [place]           [x] Phone: [phone]   Fax: [phone]  [place]           [] Phone: [phone]   Fax: [phone]        Physical Therapy Daily Treatment Note  Date:  10/16/2024    Patient Name:  Tuan Lea    :  1950  MRN: [id number]  Restrictions/Precautions: No data recorded      Diagnosis:   Closed fracture of proximal end of left humerus with routine healing, unspecified fracture morphology, subsequent encounter []    Date of Injury/Surgery: 24  Treatment Diagnosis:     Insurance/Certification information:    Referring Physician:  Tuan Mendoza PA-C     PCP: Radha Fournier APRN - NP  Next Doctor Visit:  3 months   Plan of care signed (Y/N):  sent day of eval   Outcome Measure: QuickDash   Visit# / total visits:   BOMN  Pain level: 0/10 currently  Goals:     Patient goals: \"I just want to be able to move like I used to\"    Long Term Goals  Time Frame for Long Term Goals: 6 weeks  Patient will demonstrate independence with HEP in order to promote independent management of symptoms.  Patient will improve QuickDash from 16% to <10% in order to demonstrate improved functional ability.  Patient will demonstrate gross left shoulder ROM WFL in comparison to right shoulder ROM  Patient will report <1/10 pain during ADL/iADLs without use of pain medication.  Patient will demonstrate gross left shoulder strength to 4+/5 to allow him to perform hobbies at [employer].      Summary of Evaluation:    Patient  is a 74 year old male who presents to physical therapy 3 months post left humerus open reduction internal fixation surgery on 2024. Patient reports difficulties and pain with performing ADLs/iADLs and participating in hobbies (wood work, golf). Upon assessment, patient demonstrates decreased ROM and strength in left shoulder, as expected secondary to the nature of his surgery. Shadi would benefit from ongoing skilled physical

## 2024-10-21 ENCOUNTER — HOSPITAL ENCOUNTER (OUTPATIENT)
Dept: PHYSICAL THERAPY | Age: 74
Setting detail: INFUSION SERIES
Discharge: HOME | End: 2024-10-21
Payer: MEDICARE

## 2024-10-21 PROCEDURE — 97110 THERAPEUTIC EXERCISES: CPT

## 2024-10-21 PROCEDURE — 97140 MANUAL THERAPY 1/> REGIONS: CPT

## 2024-10-21 NOTE — FLOWSHEET NOTE
Outpatient Physical Therapy  [place]           [x] Phone: [phone]   Fax: [phone]  [place]           [] Phone: [phone]   Fax: [phone]        Physical Therapy Daily Treatment Note  Date:  10/21/2024    Patient Name:  Tuan Lea    :  1950  MRN: [id number]  Restrictions/Precautions: No data recorded      Diagnosis:   Closed fracture of proximal end of left humerus with routine healing, unspecified fracture morphology, subsequent encounter []    Date of Injury/Surgery: 24  Treatment Diagnosis:     Insurance/Certification information:    Referring Physician:  Tuan Mendoza PA-C     PCP: Radha Fournier APRN - NP  Next Doctor Visit:  3 months   Plan of care signed (Y/N):  sent day of eval   Outcome Measure: QuickDash   Visit# / total visits:   BOMN  Pain level: 0/10 currently  Goals:     Patient goals: \"I just want to be able to move like I used to\"    Long Term Goals  Time Frame for Long Term Goals: 6 weeks  Patient will demonstrate independence with HEP in order to promote independent management of symptoms.  Patient will improve QuickDash from 16% to <10% in order to demonstrate improved functional ability.  Patient will demonstrate gross left shoulder ROM WFL in comparison to right shoulder ROM  Patient will report <1/10 pain during ADL/iADLs without use of pain medication.  Patient will demonstrate gross left shoulder strength to 4+/5 to allow him to perform hobbies at [employer].      Summary of Evaluation:    Patient  is a 74 year old male who presents to physical therapy 3 months post left humerus open reduction internal fixation surgery on 2024. Patient reports difficulties and pain with performing ADLs/iADLs and participating in hobbies (wood work, golf). Upon assessment, patient demonstrates decreased ROM and strength in left shoulder, as expected secondary to the nature of his surgery. Shadi would benefit from ongoing skilled physical

## 2024-10-22 ENCOUNTER — HOSPITAL ENCOUNTER (OUTPATIENT)
Dept: INFUSION THERAPY | Age: 74
Setting detail: INFUSION SERIES
Discharge: HOME | End: 2024-10-22
Payer: MEDICARE

## 2024-10-22 VITALS
HEART RATE: 56 BPM | DIASTOLIC BLOOD PRESSURE: 65 MMHG | SYSTOLIC BLOOD PRESSURE: 121 MMHG | TEMPERATURE: 97.3 F | RESPIRATION RATE: 16 BRPM | OXYGEN SATURATION: 96 %

## 2024-10-22 DIAGNOSIS — K50.814 CROHN'S DISEASE OF BOTH SMALL AND LARGE INTESTINE WITH ABSCESS (HCC): Primary | ICD-10-CM

## 2024-10-22 PROCEDURE — 96365 THER/PROPH/DIAG IV INF INIT: CPT

## 2024-10-22 PROCEDURE — 2580000003 HC RX 258: Performed by: SPECIALIST

## 2024-10-22 PROCEDURE — 6360000002 HC RX W HCPCS: Performed by: SPECIALIST

## 2024-10-22 RX ORDER — SODIUM CHLORIDE 0.9 % (FLUSH) 0.9 %
5-40 SYRINGE (ML) INJECTION PRN
Status: DISCONTINUED | OUTPATIENT
Start: 2024-10-22 | End: 2024-10-23 | Stop reason: HOSPADM

## 2024-10-22 RX ORDER — SODIUM CHLORIDE 0.9 % (FLUSH) 0.9 %
5-40 SYRINGE (ML) INJECTION PRN
OUTPATIENT
Start: 2024-10-22

## 2024-10-22 RX ADMIN — SODIUM CHLORIDE, PRESERVATIVE FREE 10 ML: 5 INJECTION INTRAVENOUS at 09:50

## 2024-10-22 RX ADMIN — SODIUM CHLORIDE, PRESERVATIVE FREE 10 ML: 5 INJECTION INTRAVENOUS at 10:40

## 2024-10-22 RX ADMIN — VEDOLIZUMAB 300 MG: 300 INJECTION, POWDER, LYOPHILIZED, FOR SOLUTION INTRAVENOUS at 10:06

## 2024-10-22 NOTE — DISCHARGE INSTRUCTIONS
Notify your doctor for:   Rash, hives, itching, or blistered or peeling skin   Signs of infection- fever, chills   Dark urine, light colored stools, yellow skin or eyes.   If your symptoms worsen after you receive treatment,  call your doctor.    Go to the ER for:   Shortness of Breath   Chest pain    Thank you for choosing [facility]fusion [facility].  It is a pleasure to serve you.    Outpatient Infusion Unit  [phone]

## 2024-10-22 NOTE — PROGRESS NOTES
Ambulatory to unit room 1 for Entyvio.Reorientated to unit.Procedure and plan of care explained.Questions answered.Understanding verbalized.Tolerated  well.Reviewed discharge instructions, understanding verbalized.Copies of AVS given to take home. Patient discharged home.Down to exit per self.    Orders Placed This Encounter   Medications    vedolizumab (ENTYVIO) 300 mg in sodium chloride 0.9 % 250 mL infusion    sodium chloride flush 0.9 % injection 5-40 mL

## 2024-10-23 ENCOUNTER — HOSPITAL ENCOUNTER (OUTPATIENT)
Dept: PHYSICAL THERAPY | Age: 74
Setting detail: INFUSION SERIES
Discharge: HOME | End: 2024-10-23
Payer: MEDICARE

## 2024-10-23 PROCEDURE — 97140 MANUAL THERAPY 1/> REGIONS: CPT

## 2024-10-23 PROCEDURE — 97110 THERAPEUTIC EXERCISES: CPT

## 2024-10-23 NOTE — FLOWSHEET NOTE
squeeze c RTB   x10 c RTB   PROPRIOCEPTION                                    MODALITIES         Denied ice             Other Therapeutic Activities/Education:    Pt educated on anatomy of condition,prognosis, and HEP day of eval     Home Exercise Program:    Wall flexion/abduction slide  Cane ER c 5s hold at end range   Left shoulder ER/IR c YTB   10/2/24-RTB provided to progress HEP     10/23/24  -scaption raises c2lb  -TB rows  -GHJ ER c scapular retraction  -T pull aparts     Manual Treatments:  PROM left shoulder , grade I-II inf post glide       Modalities:  x      Communication with other providers:  POC sent day of eval       Assessment:  (Response towards treatment session) (Pain Rating)     Today is a progress note for Shadi. Since the start of therapy, Shadi has made slow progress towards his therapy goals. Although making slow improvements, he continues to demonstrates most deficits in overhead glenohumeral joint motion. Additionally, he lacks global GHJ strength with MMT testing. Future therapy will continue to focus on regaining WFL GHJ ROM and strength, and restoring scapular rhythm to allow Shadi to participate in his IADLs and hobbies without restriction. He would continue to benefit from additional therapy to address strength, ROM and endurance with functional mobility.     End pain: 1/10 \"stiff\"     Plan for Next Session: Progress ROM and strength training as appropriate , PROM left shoulder in all directions       Time In / Time Out:  7278-5351      Timed Code/Total Treatment Minutes:  40/40, (2) TE, (1) MT      Next Progress Note due:  30 days from eval (9/25/24) or 10 visits       Plan of Care Interventions:  [x] Therapeutic Exercise  [x] Modalities:  [x] Therapeutic Activity     [] Ultrasound  [] Estim  [] Gait Training      [] Cervical Traction [] Lumbar Traction  [x] Neuromuscular Re-education    [x] Cold/hotpack [] Iontophoresis   [x] Instruction in HEP      [x] Vasopneumatic   [] Dry Needling

## 2024-10-23 NOTE — PROGRESS NOTES
Outpatient Physical Therapy           [place]           [x] Phone: [phone]   Fax: [phone]  [place]           [] Phone: [phone]   Fax: [phone]      To: Tuan Mendoza PA-C     From: Linda Miller, PT, DPT     Patient: Tuan Alemanough                    : 1950  Diagnosis:  Closed fracture of proximal end of left humerus with routine healing, unspecified fracture morphology, subsequent encounter [S4.]        Treatment Diagnosis:   decreased left shoulder ROM and strength     Date: 10/23/2024  [x]  Progress Note                []  Discharge Note    Evaluation Date:  24   Total Visits to date:   9 Cancels/No-shows to date:  0    Subjective:    \"I am doing okay today. It is feeling a little stiff. I am stiff having most difficulties with reaching overhead. I have also noticed mowing the yard makes it pretty sore. I have not been able to do hobbies like golf yet either. ADL/iADLs are very tolerable, feels pretty stiff and sometimes a little pain when I am trying to loosen it up. No pain medications. I will try icing at home rather than here first.  \"      L shoulder pain: 0/10 \"just stiff\"    Plan of Care/Treatment to date:  [x] Therapeutic Exercise    [x] Modalities:  [x] Therapeutic Activity     [] Ultrasound  [] Electrical Stimulation  [] Gait Training      [] Cervical Traction   [] Lumbar Traction  [x] Neuromuscular Re-education  [x] Cold/hotpack [] Iontophoresis  [x] Instruction in HEP      Other:  [x] Manual Therapy       [x]  Vasopneumatic  [] Aquatic Therapy       []   Dry Needle Therapy                      Objective/Significant Findings At Last Visit/Comments:      10/23  AROM  flex = 146 deg, AAROM c cane: 155deg   abd scap plane = 135 deg  ER = 80 deg in 90 deg abd   IR- 70     L shoulder MMT:   Flx: 4/5  ABD 4/5  ER 4+/5  IR 5/5     Quick Dash:   Pain during ADLs/iADLs: 0-2/10     Cues to decreased anterior roll during Serratus punches, mitigated with cues

## 2024-10-28 ENCOUNTER — HOSPITAL ENCOUNTER (OUTPATIENT)
Dept: PHYSICAL THERAPY | Age: 74
Setting detail: INFUSION SERIES
Discharge: HOME | End: 2024-10-28
Payer: MEDICARE

## 2024-10-28 PROCEDURE — 97140 MANUAL THERAPY 1/> REGIONS: CPT

## 2024-10-28 PROCEDURE — 97110 THERAPEUTIC EXERCISES: CPT

## 2024-10-28 NOTE — FLOWSHEET NOTE
Outpatient Physical Therapy  [place]           [x] Phone: [phone]   Fax: [phone]  [place]           [] Phone: [phone]   Fax: [phone]        Physical Therapy Daily Treatment Note  Date:  10/28/2024    Patient Name:  Tuan Lea    :  1950  MRN: [id number]  Restrictions/Precautions: No data recorded      Diagnosis:   Closed fracture of proximal end of left humerus with routine healing, unspecified fracture morphology, subsequent encounter []    Date of Injury/Surgery: 24  Treatment Diagnosis:     Insurance/Certification information:    Referring Physician:  Tuan Mendoza PA-C     PCP: Radha Fournier APRN - NP  Next Doctor Visit:  3 months   Plan of care signed (Y/N):  sent day of eval   Outcome Measure: QuickDash   Visit# / total visits:  10/12 BOMN   Pain level: 0/10 currently  Goals:     Patient goals: \"I just want to be able to move like I used to\" PROGRESSING 10/23/24     Long Term Goals  Time Frame for Long Term Goals: 6 weeks  Patient will demonstrate independence with HEP in order to promote independent management of symptoms. MET 10/23/24   Patient will improve QuickDash from 16% to <10% in order to demonstrate improved functional ability. MET 6.8% 10/23/24   Patient will demonstrate gross left shoulder ROM WFL in comparison to right shoulder ROM PROGRESSING 10/23/24   Patient will report <1/10 pain during ADL/iADLs without use of pain medication.PROGRESSING 10/23/24   Patient will demonstrate gross left shoulder strength to 4+/5 to allow him to perform hobbies at [facility].PROGRESSING 10/23/24       Summary of Evaluation:    Patient  is a 74 year old male who presents to physical therapy 3 months post left humerus open reduction internal fixation surgery on 2024. Patient reports difficulties and pain with performing ADLs/iADLs and participating in hobbies (wood work, golf). Upon assessment, patient demonstrates decreased ROM and strength in

## 2024-10-30 ENCOUNTER — HOSPITAL ENCOUNTER (OUTPATIENT)
Dept: PHYSICAL THERAPY | Age: 74
Setting detail: INFUSION SERIES
Discharge: HOME | End: 2024-10-30
Payer: MEDICARE

## 2024-10-30 PROCEDURE — 97110 THERAPEUTIC EXERCISES: CPT

## 2024-10-30 PROCEDURE — 97140 MANUAL THERAPY 1/> REGIONS: CPT

## 2024-10-30 NOTE — FLOWSHEET NOTE
left shoulder, as expected secondary to the nature of his surgery. Shadi would benefit from ongoing skilled physical therapy to address deficits, return to PLOF, and reduce risk for further injury/decline in functional status.        Subjective:  \"I  am doing okay but I slept in my chair by mistake last night, so my back is hurting a lot, but arm is feeling okay. Doing the overhead exercises seem to be getting easier at home too.  \"     L shoulder pain: 0/10 \"just stiff\"   Back pain: 2-3/10        Any changes in Ambulatory Summary Sheet?  None        Objective:       10/30/24     AAROM c cane: ~150deg   PROM ABD ~130 post manual today   AROM SL ABD = 130 deg today, discomfort at end range (3/10) c 2#   good scapular upward rotation with scaption raises, mild excessive humeral head superior migration during overhead motions        Exercises: (No more than 4 columns)   Exercise/Equipment 10/21/24 #8 10/23/24 #9 10/28/24 #10 10/30/24 #11            WARM UP          UBE 2'/2'  2'/2' 2'/2'   TRX flexion stretch        TABLE       *Wall slides FLX/ABD       *Cane ER       Cane FLX 2# 3\" holds 2x10 3\" holds 2x10 3\" holds 2x10 3\" holds 2x10   Serratus 2# abdullahi chest press serratus plus  2# wt 2x10 2# wt 2x10 2# wt 2x10 2# wt 2x10   SL ER    2# 2x10  2# 2x10  2# 2x10  2# wt 2x10   SL abduction S/L scap plane 2x10 S/L scap plane 2x10  S/L scap plane 2x10  S/L scap plane c 2# 2x10           STANDING       *ER/IR YTB       *Wall slide FLX/ABD Corewheel 10x2      Theraband rows        T pull aparts  RTB 2x10 RTB 2x10 RTB 2x10 GTB x15   Ball on wall CW/CCW 4 dir 10x2 ea      Scaption raises  2# 1x10 2# 1x10 2# 1x10 2# 1x10        Serratus YTB wall slides c isometric ER X10 YTB X10 YTB X10 YTB X5 each direction YTB downward, up, lateral taps today    OH taps 2# 1x10      GHJ ER c scap squeeze c RTB  x10 c RTB x10 c RTB x10 c RTB   Biceps curl     X10 5#   , focus on eccentric control    PROPRIOCEPTION       Stature of liberty hold

## 2024-10-31 ENCOUNTER — OFFICE VISIT (OUTPATIENT)
Dept: CARDIOLOGY CLINIC | Age: 74
End: 2024-10-31

## 2024-10-31 VITALS
HEART RATE: 62 BPM | BODY MASS INDEX: 27.32 KG/M2 | HEIGHT: 66 IN | WEIGHT: 170 LBS | SYSTOLIC BLOOD PRESSURE: 138 MMHG | DIASTOLIC BLOOD PRESSURE: 70 MMHG | OXYGEN SATURATION: 99 %

## 2024-10-31 DIAGNOSIS — R00.1 BRADYCARDIA: ICD-10-CM

## 2024-10-31 DIAGNOSIS — I38 VALVULAR HEART DISEASE: ICD-10-CM

## 2024-10-31 DIAGNOSIS — I10 ESSENTIAL HYPERTENSION: Primary | ICD-10-CM

## 2024-10-31 RX ORDER — SODIUM CHLORIDE 0.9 % (FLUSH) 0.9 %
5-40 SYRINGE (ML) INJECTION PRN
OUTPATIENT
Start: 2024-12-17

## 2024-10-31 NOTE — PROGRESS NOTES
\"TROPONINT\"  BNP:  No results found for: \"PROBNP\"  PT/INR:  No results found for: \"PTINR\"  No results found for: \"LABA1C\"  Lab Results   Component Value Date    CHOL 198 01/13/2017    TRIG 325 01/13/2017    HDL 49 01/13/2017     Lab Results   Component Value Date    WBC 7.8 05/25/2024    HGB 13.2 (L) 05/25/2024    HCT 38.7 (L) 05/25/2024    MCV 98.5 05/25/2024     05/25/2024     TSH:   Lab Results   Component Value Date    TSH 2.6 01/13/2017     Lab Results   Component Value Date    AST 22 03/12/2024    ALT 9 (L) 03/12/2024    BILIDIR 0.7 (H) 06/30/2023    BILITOT 0.3 03/12/2024    ALKPHOS 77 03/12/2024         All labs, medications and tests reviewed by myself including data and history from outside source , patient and available family .      1. Essential hypertension    2. Bradycardia    3. 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  Follow-up Holter monitor indicated baseline bradycardia of around 20% without any significant PVCs or PACs.  1 short burst of SVT was noted 3 beats only.  Recommend conservative therapy    Normal exercise stress test with normal hemodynamic response and June 2022    Essential hypertension  Left ventricular hypertrophy, mild, noted on echo.  Valvular heart disease: Moderate aortic regurgitation      Order a follow-up echocardiogram next year 1 week prior to next visit to assess aortic valve    Continue with Norvasc 10 mg daily  Continue with Coreg 3.125 twice daily  Conservative treatment    CKD: Stage III.  Follow-up with nephrology, Dr. Mitchell  Hyperlipidemia: Continue with fenofibrate 145 mg daily  Crohn's disease: Mgt as Per GI            Return in about 1 year (around

## 2024-11-01 RX ORDER — ERGOCALCIFEROL 1.25 MG/1
50000 CAPSULE, LIQUID FILLED ORAL
Qty: 24 CAPSULE | Refills: 3 | Status: SHIPPED | OUTPATIENT
Start: 2024-11-04

## 2024-11-04 ENCOUNTER — HOSPITAL ENCOUNTER (OUTPATIENT)
Dept: PHYSICAL THERAPY | Age: 74
Setting detail: INFUSION SERIES
Discharge: HOME | End: 2024-11-04
Payer: MEDICARE

## 2024-11-04 PROCEDURE — 97110 THERAPEUTIC EXERCISES: CPT

## 2024-11-04 NOTE — FLOWSHEET NOTE
Outpatient Physical Therapy  [place]           [x] Phone: [phone]   Fax: [phone]  [place]           [] Phone: [phone]   Fax: [phone]        Physical Therapy Daily Treatment Note  Date:  2024    Patient Name:  Tuan Lea    :  1950  MRN: [id number]  Restrictions/Precautions: No data recorded      Diagnosis:   Closed fracture of proximal end of left humerus with routine healing, unspecified fracture morphology, subsequent encounter []    Date of Injury/Surgery: 24  Treatment Diagnosis:     Insurance/Certification information:    Referring Physician:  Tuan Mendoza PA-C     PCP: Radha Fournier APRN - NP  Next Doctor Visit:  3 months   Plan of care signed (Y/N):  sent day of eval   Outcome Measure: QuickDash   Visit# / total visits:   BOMN   Pain level: 0/10 currently  Goals:     Patient goals: \"I just want to be able to move like I used to\" PROGRESSING 10/23/24     Long Term Goals  Time Frame for Long Term Goals: 6 weeks  Patient will demonstrate independence with HEP in order to promote independent management of symptoms. MET 10/23/24   Patient will improve QuickDash from 16% to <10% in order to demonstrate improved functional ability. MET 6.8% 10/23/24   Patient will demonstrate gross left shoulder ROM WFL in comparison to right shoulder ROM PROGRESSING 10/23/24   Patient will report <1/10 pain during ADL/iADLs without use of pain medication.PROGRESSING 10/23/24   Patient will demonstrate gross left shoulder strength to 4+/5 to allow him to perform hobbies at [employer].PROGRESSING 10/23/24       Summary of Evaluation:    Patient  is a 74 year old male who presents to physical therapy 3 months post left humerus open reduction internal fixation surgery on 2024. Patient reports difficulties and pain with performing ADLs/iADLs and participating in hobbies (wood work, golf). Upon assessment, patient demonstrates decreased ROM and strength in

## 2024-11-08 ENCOUNTER — HOSPITAL ENCOUNTER (OUTPATIENT)
Dept: PHYSICAL THERAPY | Age: 74
Setting detail: INFUSION SERIES
Discharge: HOME | End: 2024-11-08
Payer: MEDICARE

## 2024-11-08 PROCEDURE — 97110 THERAPEUTIC EXERCISES: CPT

## 2024-11-11 ENCOUNTER — HOSPITAL ENCOUNTER (OUTPATIENT)
Dept: PHYSICAL THERAPY | Age: 74
Setting detail: INFUSION SERIES
Discharge: HOME | End: 2024-11-11
Payer: MEDICARE

## 2024-11-11 PROCEDURE — 97110 THERAPEUTIC EXERCISES: CPT

## 2024-11-11 PROCEDURE — 97140 MANUAL THERAPY 1/> REGIONS: CPT

## 2024-11-11 NOTE — FLOWSHEET NOTE
Outpatient Physical Therapy  [place]           [x] Phone: [phone]   Fax: [phone]  [place]           [] Phone: [phone]   Fax: [phone]        Physical Therapy Daily Treatment Note  Date:  2024    Patient Name:  Tuan Lea    :  1950  MRN: [id number]  Restrictions/Precautions: No data recorded   Diagnosis:   Closed fracture of proximal end of left humerus with routine healing, unspecified fracture morphology, subsequent encounter []    Date of Injury/Surgery: 24  Treatment Diagnosis:     Insurance/Certification information:    Referring Physician:  Tuan Mendoza PA-C     PCP: Radha Fournier APRN - NP  Next Doctor Visit:  3 months   Plan of care signed (Y/N):  POC No; PN yes  Outcome Measure: QuickDash   Visit# / total visits:   BOMN   Pain level: 0/10 currently  Goals:     Patient goals: \"I just want to be able to move like I used to\" PROGRESSING 10/23/24     Long Term Goals  Time Frame for Long Term Goals: 6 weeks  Patient will demonstrate independence with HEP in order to promote independent management of symptoms. MET 10/23/24   Patient will improve QuickDash from 16% to <10% in order to demonstrate improved functional ability. MET 6.8% 10/23/24   Patient will demonstrate gross left shoulder ROM WFL in comparison to right shoulder ROM PROGRESSING 10/23/24   Patient will report <1/10 pain during ADL/iADLs without use of pain medication.PROGRESSING 10/23/24   Patient will demonstrate gross left shoulder strength to 4+/5 to allow him to perform hobbies at [facility].PROGRESSING 10/23/24       Summary of Evaluation:    Patient  is a 74 year old male who presents to physical therapy 3 months post left humerus open reduction internal fixation surgery on 2024. Patient reports difficulties and pain with performing ADLs/iADLs and participating in hobbies (wood work, golf). Upon assessment, patient demonstrates decreased ROM and strength in left

## 2024-11-13 ENCOUNTER — HOSPITAL ENCOUNTER (OUTPATIENT)
Dept: PHYSICAL THERAPY | Age: 74
Setting detail: INFUSION SERIES
Discharge: HOME | End: 2024-11-13
Payer: MEDICARE

## 2024-11-13 PROCEDURE — 97140 MANUAL THERAPY 1/> REGIONS: CPT

## 2024-11-13 PROCEDURE — 97110 THERAPEUTIC EXERCISES: CPT

## 2024-11-13 NOTE — FLOWSHEET NOTE
Biceps curl  X10 5#   , focus on eccentric control  X10 8#   , focus on eccentric control  X10 8#   , focus on eccentric control   Back to wall  Bilat UE X15 8#   , focus on eccentric control   Back to wall  Bilat UE          PROPRIOCEPTION       Stature of liberty hold 2# X30s (1 lap back and forth)                                   MODALITIES                         Other Therapeutic Activities/Education:    Pt educated on anatomy of condition,prognosis, and HEP day of eval     Home Exercise Program:    Wall flexion/abduction slide  Cane ER c 5s hold at end range   Left shoulder ER/IR c YTB   10/2/24-RTB provided to progress HEP     10/23/24  -scaption raises c2lb  -TB rows  -GHJ ER c scapular retraction  -T pull aparts RTB    Access Code: [email or web address]  URL: [email or web address]  Date: 11/11/2024  Prepared by: Evelyn Ray    Exercises  - Seated Cervical Sidebending Stretch  - 3 x daily - 7 x weekly - 3 sets - 1 reps - 10-30s hold  - Seated Scalene Stretch with Towel  - 3 x daily - 7 x weekly - 3 sets - 1 reps - 10-30s hold    Manual Treatments:  PROM, grade I-II inf/ post joint mob left shoulder, left scalene TPR/STM        Modalities:  x      Communication with other providers:  POC sent day of eval       Assessment:  (Response towards treatment session) (Pain Rating)   Pt demonstrated good tolerance to today's session with less stiffness following. ROM continuing to show improvement. Remains TTP L ant scalene.  Will continue to benefit from skilled therapy interventions to address remaining impairments, improve mobility and strength and progress toward goal completion while reducing risk for re-injury or further decline.   End pain: 0/10 \"fatigue\"     Plan for Next Session: Progress ROM and strength training as appropriate , PROM left shoulder in all directions       Time In / Time Out:  8360-3164      Timed Code/Total Treatment Minutes: 40/40, 1 MT 2TE       Next Progress Note due:  30 days from eval

## 2024-11-18 ENCOUNTER — HOSPITAL ENCOUNTER (OUTPATIENT)
Dept: PHYSICAL THERAPY | Age: 74
Setting detail: INFUSION SERIES
Discharge: HOME | End: 2024-11-18
Payer: MEDICARE

## 2024-11-18 PROCEDURE — 97140 MANUAL THERAPY 1/> REGIONS: CPT

## 2024-11-18 PROCEDURE — 97110 THERAPEUTIC EXERCISES: CPT

## 2024-11-18 NOTE — FLOWSHEET NOTE
Outpatient Physical Therapy  [place]           [x] Phone: [phone]   Fax: [phone]  [place]           [] Phone: [phone]   Fax: [phone]        Physical Therapy Daily Treatment Note  Date:  2024    Patient Name:  Tuan Lea    :  1950  MRN: [id number]  Restrictions/Precautions: No data recorded   Diagnosis:   Closed fracture of proximal end of left humerus with routine healing, unspecified fracture morphology, subsequent encounter []    Date of Injury/Surgery: 24  Treatment Diagnosis:     Insurance/Certification information:    Referring Physician:  Tuan Mendoza PA-C     PCP: Radha Fournier APRN - NP  Next Doctor Visit:  Dec   Plan of care signed (Y/N):  POC No; PN yes  Outcome Measure: QuickDash   Visit# / total visits:   BOMN   Pain level: 0/10 currently  Goals:     Patient goals: \"I just want to be able to move like I used to\" PROGRESSING 10/23/24     Long Term Goals  Time Frame for Long Term Goals: 6 weeks  Patient will demonstrate independence with HEP in order to promote independent management of symptoms. MET 10/23/24   Patient will improve QuickDash from 16% to <10% in order to demonstrate improved functional ability. MET 6.8% 10/23/24   Patient will demonstrate gross left shoulder ROM WFL in comparison to right shoulder ROM PROGRESSING 10/23/24   Patient will report <1/10 pain during ADL/iADLs without use of pain medication.PROGRESSING 10/23/24   Patient will demonstrate gross left shoulder strength to 4+/5 to allow him to perform hobbies at [employer].PROGRESSING 10/23/24       Summary of Evaluation:    Patient  is a 74 year old male who presents to physical therapy 3 months post left humerus open reduction internal fixation surgery on 2024. Patient reports difficulties and pain with performing ADLs/iADLs and participating in hobbies (wood work, golf). Upon assessment, patient demonstrates decreased ROM and strength in left

## 2024-11-20 ENCOUNTER — HOSPITAL ENCOUNTER (OUTPATIENT)
Dept: PHYSICAL THERAPY | Age: 74
Setting detail: INFUSION SERIES
Discharge: HOME | End: 2024-11-20
Payer: MEDICARE

## 2024-11-20 PROCEDURE — 97140 MANUAL THERAPY 1/> REGIONS: CPT

## 2024-11-20 PROCEDURE — 97110 THERAPEUTIC EXERCISES: CPT

## 2024-11-20 NOTE — FLOWSHEET NOTE
Outpatient Physical Therapy  [place]           [x] Phone: [phone]   Fax: [phone]  [place]           [] Phone: [phone]   Fax: [phone]        Physical Therapy Daily Treatment Note  Date:  2024    Patient Name:  Tuan Lea    :  1950  MRN: [id number]  Restrictions/Precautions: No data recorded   Diagnosis:   Closed fracture of proximal end of left humerus with routine healing, unspecified fracture morphology, subsequent encounter []    Date of Injury/Surgery: 24  Treatment Diagnosis:     Insurance/Certification information:    Referring Physician:  Tuan Mendoza PA-C     PCP: Radha Fournier APRN - NP  Next Doctor Visit:  Dec   Plan of care signed (Y/N):  POC No; PN yes  Outcome Measure: QuickDash   Visit# / total visits:   BOMN   Pain level: 0/10 currently  Goals:     Patient goals: \"I just want to be able to move like I used to\" PROGRESSING 10/23/24     Long Term Goals  Time Frame for Long Term Goals: 6 weeks  Patient will demonstrate independence with HEP in order to promote independent management of symptoms. MET 10/23/24   Patient will improve QuickDash from 16% to <10% in order to demonstrate improved functional ability. MET 6.8% 10/23/24   Patient will demonstrate gross left shoulder ROM WFL in comparison to right shoulder ROM PROGRESSING 10/23/24   Patient will report <1/10 pain during ADL/iADLs without use of pain medication.PROGRESSING 10/23/24   Patient will demonstrate gross left shoulder strength to 4+/5 to allow him to perform hobbies at [employer].PROGRESSING 10/23/24       Summary of Evaluation:    Patient  is a 74 year old male who presents to physical therapy 3 months post left humerus open reduction internal fixation surgery on 2024. Patient reports difficulties and pain with performing ADLs/iADLs and participating in hobbies (wood work, golf). Upon assessment, patient demonstrates decreased ROM and strength in left

## 2024-11-26 ENCOUNTER — HOSPITAL ENCOUNTER (OUTPATIENT)
Dept: PHYSICAL THERAPY | Age: 74
Setting detail: INFUSION SERIES
Discharge: HOME | End: 2024-11-26
Payer: MEDICARE

## 2024-11-26 PROCEDURE — 97110 THERAPEUTIC EXERCISES: CPT

## 2024-11-26 PROCEDURE — 97530 THERAPEUTIC ACTIVITIES: CPT

## 2024-11-26 NOTE — PROGRESS NOTES
Outpatient Physical Therapy           [place]           [x] Phone: [phone]   Fax: [phone]  [place]           [] Phone: [phone]   Fax: [phone]      To: Tuan Mendoza PA-C     From: Linda Miller, PT, DPT     Patient: Tuan Beardullough                    : 1950  Diagnosis:  Closed fracture of proximal end of left humerus with routine healing, unspecified fracture morphology, subsequent encounter [S4.]        Treatment Diagnosis:   ecreased left shoulder ROM and strength     Date: 2024  [x]  Progress Note                []  Discharge Note    Evaluation Date:   24  Total Visits to date:   18 Cancels/No-shows to date:  0    Subjective:    \" I am doing well, no pain today. It has been pretty positive , not huge improvements day to day, but over a weeks time, I can tell a difference in ROM and strength. I did rake some leaves yesterday. Reaching has gotten a lot easier, still  most difficult. Unless I am really exerting, most of my regular activities do not elicit any pain, it is really just moreso stiffness.  I would say my arm feels more than half way compared to my PLOF, 2/3- 3/4 of 100%. I feel like I can do most things I used to be able to do. Some times I do have some pain when I lay on the shoulder, but that does not happen frequently.\"     L shoulder pain: 0/10, just stiff feeling    Plan of Care/Treatment to date:  [x] Therapeutic Exercise    [x] Modalities:  [x] Therapeutic Activity     [] Ultrasound  [] Electrical Stimulation  [] Gait Training      [] Cervical Traction   [] Lumbar Traction  [x] Neuromuscular Re-education  [x] Cold/hotpack [] Iontophoresis  [x] Instruction in HEP      Other:  [x] Manual Therapy       [x]  Vasopneumatic  [] Aquatic Therapy       []   Dry Needle Therapy                      Objective/Significant Findings At Last Visit/Comments:    24  Mild  TTP left >R distal ant scalene, upper trap      QuickDash: 13%     L shoulder MMT

## 2024-11-26 NOTE — FLOWSHEET NOTE
Outpatient Physical Therapy  [place]           [x] Phone: [phone]   Fax: [phone]  [place]           [] Phone: [phone]   Fax: [phone]        Physical Therapy Daily Treatment Note  Date:  2024    Patient Name:  Tuan Lea    :  1950  MRN: [id number]  Restrictions/Precautions: No data recorded   Diagnosis:   Closed fracture of proximal end of left humerus with routine healing, unspecified fracture morphology, subsequent encounter []    Date of Injury/Surgery: 24  Treatment Diagnosis:     Insurance/Certification information:    Referring Physician:  Tuan Mendoza PA-C     PCP: Radha Fournier APRN - NP  Next Doctor Visit:  Dec   Plan of care signed (Y/N):  POC No; PN yes  Outcome Measure: QuickDash   Visit# / total visits:   BOMN   Pain level: 0/10 currently  Goals:     Patient goals: \"I just want to be able to move like I used to\" PROGRESSING 24    Long Term Goals  Time Frame for Long Term Goals: 6 weeks  Patient will demonstrate independence with HEP in order to promote independent management of symptoms. MET 24  Patient will improve QuickDash from 16% to <10% in order to demonstrate improved functional ability. MET 6.8%   24  Patient will demonstrate gross left shoulder ROM WFL in comparison to right shoulder ROM PROGRESSING 24   Patient will report <1/10 pain during ADL/iADLs without use of pain medication.MET 24  Patient will demonstrate gross left shoulder strength to 4+/5 to allow him to perform hobbies at [employer].MET 24      Summary of Evaluation:    Patient  is a 74 year old male who presents to physical therapy 3 months post left humerus open reduction internal fixation surgery on 2024. Patient reports difficulties and pain with performing ADLs/iADLs and participating in hobbies (wood work, golf). Upon assessment, patient demonstrates decreased ROM and strength in left shoulder, as expected

## 2024-12-02 ENCOUNTER — HOSPITAL ENCOUNTER (OUTPATIENT)
Dept: PHYSICAL THERAPY | Age: 74
Setting detail: INFUSION SERIES
Discharge: HOME | End: 2024-12-02
Payer: MEDICARE

## 2024-12-02 PROCEDURE — 97110 THERAPEUTIC EXERCISES: CPT

## 2024-12-02 PROCEDURE — 97112 NEUROMUSCULAR REEDUCATION: CPT

## 2024-12-02 NOTE — FLOWSHEET NOTE
Outpatient Physical Therapy  [place]           [x] Phone: [phone]   Fax: [phone]  [place]           [] Phone: [phone]   Fax: [phone]        Physical Therapy Daily Treatment Note  Date:  2024    Patient Name:  Tuan Lea    :  1950  MRN: [id number]  Restrictions/Precautions: No data recorded   Diagnosis:   Closed fracture of proximal end of left humerus with routine healing, unspecified fracture morphology, subsequent encounter []    Date of Injury/Surgery: 24  Treatment Diagnosis:     Insurance/Certification information:    Referring Physician:  Tuan Mendoza PA-C     PCP: Radha Fournier APRN - NP  Next Doctor Visit:  Dec   Plan of care signed (Y/N):  POC No; PN yes  Outcome Measure: QuickDash   Visit# / total visits:   BOMN   Pain level: 0/10 currently  Goals:     Patient goals: \"I just want to be able to move like I used to\" PROGRESSING 24    Long Term Goals  Time Frame for Long Term Goals: 6 weeks  Patient will demonstrate independence with HEP in order to promote independent management of symptoms. MET 24  Patient will improve QuickDash from 16% to <10% in order to demonstrate improved functional ability. MET 6.8%   24  Patient will demonstrate gross left shoulder ROM WFL in comparison to right shoulder ROM PROGRESSING 24   Patient will report <1/10 pain during ADL/iADLs without use of pain medication.MET 24  Patient will demonstrate gross left shoulder strength to 4+/5 to allow him to perform hobbies at [facility].MET 24      Summary of Evaluation:    Patient  is a 74 year old male who presents to physical therapy 3 months post left humerus open reduction internal fixation surgery on 2024. Patient reports difficulties and pain with performing ADLs/iADLs and participating in hobbies (wood work, golf). Upon assessment, patient demonstrates decreased ROM and strength in left shoulder, as expected

## 2024-12-11 ENCOUNTER — HOSPITAL ENCOUNTER (OUTPATIENT)
Dept: PHYSICAL THERAPY | Age: 74
Setting detail: INFUSION SERIES
Discharge: HOME | End: 2024-12-11
Payer: MEDICARE

## 2024-12-11 ENCOUNTER — OFFICE VISIT (OUTPATIENT)
Dept: ORTHOPEDIC SURGERY | Age: 74
End: 2024-12-11
Payer: MEDICARE

## 2024-12-11 VITALS
HEIGHT: 66 IN | OXYGEN SATURATION: 97 % | BODY MASS INDEX: 27.32 KG/M2 | RESPIRATION RATE: 14 BRPM | HEART RATE: 62 BPM | WEIGHT: 170 LBS

## 2024-12-11 DIAGNOSIS — S42.202D CLOSED FRACTURE OF PROXIMAL END OF LEFT HUMERUS WITH ROUTINE HEALING, UNSPECIFIED FRACTURE MORPHOLOGY, SUBSEQUENT ENCOUNTER: Primary | ICD-10-CM

## 2024-12-11 PROCEDURE — 97110 THERAPEUTIC EXERCISES: CPT

## 2024-12-11 PROCEDURE — 99213 OFFICE O/P EST LOW 20 MIN: CPT | Performed by: PHYSICIAN ASSISTANT

## 2024-12-11 PROCEDURE — G8417 CALC BMI ABV UP PARAM F/U: HCPCS | Performed by: PHYSICIAN ASSISTANT

## 2024-12-11 PROCEDURE — G8484 FLU IMMUNIZE NO ADMIN: HCPCS | Performed by: PHYSICIAN ASSISTANT

## 2024-12-11 PROCEDURE — 1125F AMNT PAIN NOTED PAIN PRSNT: CPT | Performed by: PHYSICIAN ASSISTANT

## 2024-12-11 PROCEDURE — G8427 DOCREV CUR MEDS BY ELIG CLIN: HCPCS | Performed by: PHYSICIAN ASSISTANT

## 2024-12-11 PROCEDURE — 1159F MED LIST DOCD IN RCRD: CPT | Performed by: PHYSICIAN ASSISTANT

## 2024-12-11 PROCEDURE — 1124F ACP DISCUSS-NO DSCNMKR DOCD: CPT | Performed by: PHYSICIAN ASSISTANT

## 2024-12-11 PROCEDURE — 1036F TOBACCO NON-USER: CPT | Performed by: PHYSICIAN ASSISTANT

## 2024-12-11 ASSESSMENT — ENCOUNTER SYMPTOMS
EYES NEGATIVE: 1
GASTROINTESTINAL NEGATIVE: 1
RESPIRATORY NEGATIVE: 1

## 2024-12-11 NOTE — FLOWSHEET NOTE
Outpatient Physical Therapy  [place]           [x] Phone: [phone]   Fax: [phone]  [place]           [] Phone: [phone]   Fax: [phone]        Physical Therapy Daily Treatment Note  Date:  2024    Patient Name:  Tuan Lea    :  1950  MRN: [id number]  Restrictions/Precautions: No data recorded   Diagnosis:   Closed fracture of proximal end of left humerus with routine healing, unspecified fracture morphology, subsequent encounter []    Date of Injury/Surgery: 24  Treatment Diagnosis:     Insurance/Certification information:    Referring Physician:  Tuan Mendoza PA-C     PCP: Radha Fournier APRN - NP  Next Doctor Visit:  Dec   Plan of care signed (Y/N):  POC No; PN yes  Outcome Measure: QuickDash   Visit# / total visits:   BOMN   Pain level: 0/10 currently  Goals:     Patient goals: \"I just want to be able to move like I used to\" MET 24    Long Term Goals  Time Frame for Long Term Goals: 6 weeks  Patient will demonstrate independence with HEP in order to promote independent management of symptoms. MET 24  Patient will improve QuickDash from 16% to <10% in order to demonstrate improved functional ability. MET 11%  24  Patient will demonstrate gross left shoulder ROM WFL in comparison to right shoulder ROM MET 24  Patient will report <1/10 pain during ADL/iADLs without use of pain medication.MET 24  Patient will demonstrate gross left shoulder strength to 4+/5 to allow him to perform hobbies at [facility].MET 24      Summary of Evaluation:    Patient  is a 74 year old male who presents to physical therapy 3 months post left humerus open reduction internal fixation surgery on 2024. Patient reports difficulties and pain with performing ADLs/iADLs and participating in hobbies (wood work, golf). Upon assessment, patient demonstrates decreased ROM and strength in left shoulder, as expected secondary to the nature

## 2024-12-11 NOTE — PROGRESS NOTES
Date of surgery:   6/12/2024  Surgeon: Dr. Sears    History:  Mr. Tuan Lea is here in follow up regarding his left proximal humerus fracture ORIF.  He has been doing physical therapy and working hard and is almost back to normal motion in the shoulder.  He does have some pain in the shoulder but it is better than it was before at his last visit.  Occasionally he states he does get a sharp pain in the shoulder with certain motion but it is not persistent.    Physical:  Vitals:    12/11/24 0921   Pulse: 62   Resp: 14   SpO2: 97%     Review of Systems   Constitutional: Negative.    HENT: Negative.     Eyes: Negative.    Respiratory: Negative.     Cardiovascular: Negative.    Gastrointestinal: Negative.    Genitourinary: Negative.    Musculoskeletal:  Positive for myalgias.   Skin: Negative.    Neurological: Negative.    Psychiatric/Behavioral: Negative.            He demonstrates appropriate mood and affect.   Surgical incision is well-healed, no erythema.  Full range of motion of the elbow and the wrist.    Forward elevation 170 degrees  Abduction 170 degrees  External rotation 50 degrees.  Strength is 5 -/5.        Impression: Status post above, doing well       Plan:   At this point I believe he has improved to the point where therapy can be done at home on his own.  I feel like he should finish up his last therapy session and get a home exercise program.  Patient Instructions   Continue weight-bearing as tolerated.  Continue range of motion exercises as instructed.  Ice and elevate as needed.  Tylenol or Motrin for pain.  Follow up as needed.    We are committed to providing you the best care possible.  If you receive a survey after visiting one of our offices, please take time to share your experience concerning your physician office visit.  These surveys are confidential and no health information about you is shared.  We are eager to improve for you and we are counting on your feedback to help make

## 2024-12-11 NOTE — DISCHARGE SUMMARY
Outpatient Physical Therapy           [place]           [x] Phone: [phone]   Fax: [phone]  [place]           [] Phone: [phone]   Fax: [phone]      To: Tuan Mendoza PA-C     From: Linda Miller, PT, DPT     Patient: Tuan Lea                    : 1950  Diagnosis:  Closed fracture of proximal end of left humerus with routine healing, unspecified fracture morphology, subsequent encounter [S4.]        Treatment Diagnosis:    decreased left shoulder ROM and strength    Date: 2024  []  Progress Note                [x]  Discharge Note    Evaluation Date:  24   Total Visits to date:   20 Cancels/No-shows to date:  0    Subjective:    \" I am feeling okay, I do have a little cold though which isnt making me feel 100%. I saw the DR and he said my motion is average with how other people are and I am doing well. I do not have another follow up with him.  The exercises at home still feel like a good challenge and I am tired by the time I am tired\"  L shoulder pain: 0/10, just stiff feeling    Plan of Care/Treatment to date:  [x] Therapeutic Exercise    [x] Modalities:  [x] Therapeutic Activity     [] Ultrasound  [] Electrical Stimulation  [] Gait Training      [] Cervical Traction   [] Lumbar Traction  [x] Neuromuscular Re-education  [x] Cold/hotpack [] Iontophoresis  [x] Instruction in HEP      Other:  [x] Manual Therapy       [x]  Vasopneumatic  [] Aquatic Therapy       []   Dry Needle Therapy                      Objective/Significant Findings At Last Visit/Comments:    24   QuickDash:  11%     L shoulder MMT   FLX: 5/5  ABD: 4+/5  ER: 4+/4  IR: 5/5      AROM  Flex:158°  Abd :162 °  IR: 61deg, anterior translation of humeral head noted  ER: 75deg     Assessment:     Today is Tuan's discharge. Since the start of therapy, Tuan has made good progress towards meeting his therapy goals. His ROM and strength have improved to WFL. QuickDash indicated very low level

## 2024-12-11 NOTE — PATIENT INSTRUCTIONS
Continue weight-bearing as tolerated.  Continue range of motion exercises as instructed.  Ice and elevate as needed.  Tylenol or Motrin for pain.  Follow up as needed.    We are committed to providing you the best care possible.  If you receive a survey after visiting one of our offices, please take time to share your experience concerning your physician office visit.  These surveys are confidential and no health information about you is shared.  We are eager to improve for you and we are counting on your feedback to help make that happen.

## 2024-12-17 ENCOUNTER — HOSPITAL ENCOUNTER (OUTPATIENT)
Dept: INFUSION THERAPY | Age: 74
Setting detail: INFUSION SERIES
Discharge: HOME | End: 2024-12-17
Payer: MEDICARE

## 2024-12-17 VITALS
OXYGEN SATURATION: 95 % | SYSTOLIC BLOOD PRESSURE: 126 MMHG | HEART RATE: 70 BPM | DIASTOLIC BLOOD PRESSURE: 70 MMHG | RESPIRATION RATE: 16 BRPM | TEMPERATURE: 97.7 F

## 2024-12-17 DIAGNOSIS — K50.814 CROHN'S DISEASE OF BOTH SMALL AND LARGE INTESTINE WITH ABSCESS (HCC): Primary | ICD-10-CM

## 2024-12-17 PROCEDURE — 6360000002 HC RX W HCPCS: Performed by: SPECIALIST

## 2024-12-17 PROCEDURE — 96365 THER/PROPH/DIAG IV INF INIT: CPT

## 2024-12-17 PROCEDURE — 2580000003 HC RX 258: Performed by: SPECIALIST

## 2024-12-17 RX ORDER — SODIUM CHLORIDE 0.9 % (FLUSH) 0.9 %
5-40 SYRINGE (ML) INJECTION PRN
OUTPATIENT
Start: 2025-02-11

## 2024-12-17 RX ORDER — SODIUM CHLORIDE 0.9 % (FLUSH) 0.9 %
5-40 SYRINGE (ML) INJECTION PRN
Status: DISCONTINUED | OUTPATIENT
Start: 2024-12-17 | End: 2024-12-18 | Stop reason: HOSPADM

## 2024-12-17 RX ADMIN — VEDOLIZUMAB 300 MG: 300 INJECTION, POWDER, LYOPHILIZED, FOR SOLUTION INTRAVENOUS at 10:03

## 2024-12-17 RX ADMIN — SODIUM CHLORIDE, PRESERVATIVE FREE 30 ML: 5 INJECTION INTRAVENOUS at 10:35

## 2024-12-17 RX ADMIN — SODIUM CHLORIDE, PRESERVATIVE FREE 10 ML: 5 INJECTION INTRAVENOUS at 09:55

## 2024-12-17 NOTE — DISCHARGE INSTRUCTIONS
Notify your doctor for:   Rash, hives, itching, or blistered or peeling skin   Signs of infection- fever, chills   Dark urine, light colored stools, yellow skin or eyes.   If your symptoms worsen after you receive treatment,  call your doctor.    Go to the ER for:   Shortness of Breath   Chest pain    Thank you for choosing [facility]fusion [facility].  It is a pleasure to serve you.    Outpatient Infusion Unit  [phone]

## 2025-01-15 ENCOUNTER — OFFICE VISIT (OUTPATIENT)
Dept: GASTROENTEROLOGY | Age: 75
End: 2025-01-15
Payer: MEDICARE

## 2025-01-15 VITALS
SYSTOLIC BLOOD PRESSURE: 120 MMHG | BODY MASS INDEX: 27.48 KG/M2 | HEART RATE: 55 BPM | HEIGHT: 66 IN | OXYGEN SATURATION: 97 % | RESPIRATION RATE: 16 BRPM | DIASTOLIC BLOOD PRESSURE: 66 MMHG | WEIGHT: 171 LBS

## 2025-01-15 DIAGNOSIS — K50.814 CROHN'S DISEASE OF BOTH SMALL AND LARGE INTESTINE WITH ABSCESS (HCC): Primary | ICD-10-CM

## 2025-01-15 DIAGNOSIS — K86.2 PANCREAS CYST: ICD-10-CM

## 2025-01-15 DIAGNOSIS — K50.80 CROHN'S DISEASE OF BOTH SMALL AND LARGE INTESTINE WITHOUT COMPLICATION (HCC): ICD-10-CM

## 2025-01-15 DIAGNOSIS — K80.50 CHOLEDOCHOLITHIASIS: ICD-10-CM

## 2025-01-15 DIAGNOSIS — K21.9 GASTROESOPHAGEAL REFLUX DISEASE, UNSPECIFIED WHETHER ESOPHAGITIS PRESENT: ICD-10-CM

## 2025-01-15 PROCEDURE — G8417 CALC BMI ABV UP PARAM F/U: HCPCS | Performed by: INTERNAL MEDICINE

## 2025-01-15 PROCEDURE — 1036F TOBACCO NON-USER: CPT | Performed by: INTERNAL MEDICINE

## 2025-01-15 PROCEDURE — 1159F MED LIST DOCD IN RCRD: CPT | Performed by: INTERNAL MEDICINE

## 2025-01-15 PROCEDURE — 3078F DIAST BP <80 MM HG: CPT | Performed by: INTERNAL MEDICINE

## 2025-01-15 PROCEDURE — 3074F SYST BP LT 130 MM HG: CPT | Performed by: INTERNAL MEDICINE

## 2025-01-15 PROCEDURE — 1124F ACP DISCUSS-NO DSCNMKR DOCD: CPT | Performed by: INTERNAL MEDICINE

## 2025-01-15 PROCEDURE — G2211 COMPLEX E/M VISIT ADD ON: HCPCS | Performed by: INTERNAL MEDICINE

## 2025-01-15 PROCEDURE — 99214 OFFICE O/P EST MOD 30 MIN: CPT | Performed by: INTERNAL MEDICINE

## 2025-01-15 PROCEDURE — G8427 DOCREV CUR MEDS BY ELIG CLIN: HCPCS | Performed by: INTERNAL MEDICINE

## 2025-01-15 NOTE — PROGRESS NOTES
[facility] Gastroenterology and Hepat[facility]             MD Amelia Henry MD Carol Christensen, APRN-CNP       Radha Ritchie, APRN-CNP             [address] [place] [address]             [phone] fax [phone]        Gastroenterology Clinic Consultation    Prabhakar Petersen MD  Encounter Date: 01/15/25     CC: Follow-up (6 month f/u- Dr. Chaves pt )       No referring provider defined for this encounter.     History obtained from: patient, family, medical records     Subjective:       Tuan Lea is an 74 y.o. male with past medical history of Crohn's disease with extensive ileocolonic resection and known ileocolonic stricture, SBO, choledocholithiasis status post ERCP.  Who presents for Follow-up (6 month f/u- Dr. Chaves pt )    Patient has longstanding Crohn's disease with history of ileocolonic involvement and extensive ileocolonic resection with subtotal colectomy and ileocolonic anastomosis stricture. He was initially diagnosed 1973.  Patient has been followed by Dr. Chaves for long time and is currently on Entyvio infusion.  His last flexible sigmoidoscopy was done by him on 08/28/2024 showing subtotal colectomy with end-to-side ileosigmoid anastomosis.  The anastomosis permitted the passage of pediatric colonoscopy into the neoterminal ileum and the scope was advanced 10 cm into the neoterminal ileum with no active Crohn's to that point.  There was 2 areas of apparent granulation tissue near the anastomosis that were biopsied.  Small grade 1 hemorrhoids.  Biopsies did not show any dysplasia or malignancy and showed benign small bowel mucosa containing mixed inflammation and granulation tissue.     Patient Active Problem List   Diagnosis    Crohn's disease of both small and large intestine with abscess (HCC)    SBO (small bowel obstruction) (HCC)    Crohn's disease of both small and large intestine with intestinal obstruction

## 2025-02-11 ENCOUNTER — HOSPITAL ENCOUNTER (OUTPATIENT)
Dept: INFUSION THERAPY | Age: 75
Setting detail: INFUSION SERIES
Discharge: HOME | End: 2025-02-11
Payer: MEDICARE

## 2025-02-11 VITALS
HEART RATE: 58 BPM | OXYGEN SATURATION: 99 % | TEMPERATURE: 97 F | DIASTOLIC BLOOD PRESSURE: 60 MMHG | SYSTOLIC BLOOD PRESSURE: 105 MMHG | RESPIRATION RATE: 16 BRPM

## 2025-02-11 DIAGNOSIS — K50.814 CROHN'S DISEASE OF BOTH SMALL AND LARGE INTESTINE WITH ABSCESS (HCC): Primary | ICD-10-CM

## 2025-02-11 PROCEDURE — 2500000003 HC RX 250 WO HCPCS: Performed by: SPECIALIST

## 2025-02-11 PROCEDURE — 96365 THER/PROPH/DIAG IV INF INIT: CPT

## 2025-02-11 PROCEDURE — 2580000003 HC RX 258: Performed by: SPECIALIST

## 2025-02-11 PROCEDURE — 6360000002 HC RX W HCPCS: Performed by: SPECIALIST

## 2025-02-11 RX ORDER — SODIUM CHLORIDE 0.9 % (FLUSH) 0.9 %
5-40 SYRINGE (ML) INJECTION PRN
OUTPATIENT
Start: 2025-04-08

## 2025-02-11 RX ORDER — SODIUM CHLORIDE 0.9 % (FLUSH) 0.9 %
5-40 SYRINGE (ML) INJECTION PRN
Status: DISCONTINUED | OUTPATIENT
Start: 2025-02-11 | End: 2025-02-12 | Stop reason: HOSPADM

## 2025-02-11 RX ADMIN — VEDOLIZUMAB 300 MG: 300 INJECTION, POWDER, LYOPHILIZED, FOR SOLUTION INTRAVENOUS at 10:02

## 2025-02-11 RX ADMIN — SODIUM CHLORIDE, PRESERVATIVE FREE 30 ML: 5 INJECTION INTRAVENOUS at 10:35

## 2025-02-11 RX ADMIN — SODIUM CHLORIDE, PRESERVATIVE FREE 10 ML: 5 INJECTION INTRAVENOUS at 09:55

## 2025-02-11 NOTE — PROGRESS NOTES
Ambulatory to unit room 1 for ENTYVIO.Orientated to unit.Procedure and plan of care explained.Questions answered.Understanding verbalized.Tolerated  well.Reviewed discharge instructions, understanding verbalized.Copies of AVS given to take home. Patient discharged home.Down to exit per self.    Orders Placed This Encounter   Medications    vedolizumab (ENTYVIO) 300 mg in sodium chloride 0.9 % 250 mL infusion    sodium chloride flush 0.9 % injection 5-40 mL

## 2025-02-11 NOTE — DISCHARGE INSTRUCTIONS
Notify your doctor for:   Rash, hives, itching, or blistered or peeling skin   Signs of infection- fever, chills   Dark urine, light colored stools, yellow skin or eyes.   If your symptoms worsen after you receive treatment,  call your doctor.    Go to the ER for:   Shortness of Breath   Chest pain    Thank you for choosing [facility]fusion [facility].  It is a pleasure to serve you.    Outpatient Infusion Unit  [phone]

## 2025-02-27 ENCOUNTER — HOSPITAL ENCOUNTER (OUTPATIENT)
Dept: GENERAL RADIOLOGY | Age: 75
Discharge: HOME | End: 2025-02-27
Payer: MEDICARE

## 2025-02-27 ENCOUNTER — HOSPITAL ENCOUNTER (OUTPATIENT)
Age: 75
Discharge: HOME | End: 2025-02-27
Payer: MEDICARE

## 2025-02-27 DIAGNOSIS — S49.91XA INJURY OF UPPER ARM, RIGHT, INITIAL ENCOUNTER: ICD-10-CM

## 2025-02-27 PROCEDURE — 73060 X-RAY EXAM OF HUMERUS: CPT

## 2025-02-27 PROCEDURE — 73030 X-RAY EXAM OF SHOULDER: CPT

## 2025-04-08 ENCOUNTER — HOSPITAL ENCOUNTER (OUTPATIENT)
Dept: INFUSION THERAPY | Age: 75
Setting detail: INFUSION SERIES
Discharge: HOME | End: 2025-04-08
Payer: MEDICARE

## 2025-04-08 VITALS
SYSTOLIC BLOOD PRESSURE: 133 MMHG | HEART RATE: 58 BPM | TEMPERATURE: 97.2 F | OXYGEN SATURATION: 98 % | DIASTOLIC BLOOD PRESSURE: 61 MMHG | RESPIRATION RATE: 16 BRPM

## 2025-04-08 DIAGNOSIS — K50.814 CROHN'S DISEASE OF BOTH SMALL AND LARGE INTESTINE WITH ABSCESS (HCC): Primary | ICD-10-CM

## 2025-04-08 PROCEDURE — 2580000003 HC RX 258: Performed by: SPECIALIST

## 2025-04-08 PROCEDURE — 96365 THER/PROPH/DIAG IV INF INIT: CPT

## 2025-04-08 PROCEDURE — 2500000003 HC RX 250 WO HCPCS: Performed by: SPECIALIST

## 2025-04-08 PROCEDURE — 6360000002 HC RX W HCPCS: Performed by: SPECIALIST

## 2025-04-08 RX ORDER — SODIUM CHLORIDE 0.9 % (FLUSH) 0.9 %
5-40 SYRINGE (ML) INJECTION PRN
OUTPATIENT
Start: 2025-04-08

## 2025-04-08 RX ORDER — SODIUM CHLORIDE 0.9 % (FLUSH) 0.9 %
5-40 SYRINGE (ML) INJECTION PRN
Status: DISCONTINUED | OUTPATIENT
Start: 2025-04-08 | End: 2025-04-09 | Stop reason: HOSPADM

## 2025-04-08 RX ADMIN — SODIUM CHLORIDE, PRESERVATIVE FREE 30 ML: 5 INJECTION INTRAVENOUS at 10:30

## 2025-04-08 RX ADMIN — VEDOLIZUMAB 300 MG: 300 INJECTION, POWDER, LYOPHILIZED, FOR SOLUTION INTRAVENOUS at 09:56

## 2025-04-08 RX ADMIN — SODIUM CHLORIDE, PRESERVATIVE FREE 10 ML: 5 INJECTION INTRAVENOUS at 09:45

## 2025-04-08 NOTE — DISCHARGE INSTRUCTIONS
Notify your doctor for:   Rash, hives, itching, or blistered or peeling skin   Signs of infection- fever, chills   Dark urine, light colored stools, yellow skin or eyes.   If your symptoms worsen after you receive treatment,  call your doctor.    Go to the ER for:   Shortness of Breath   Chest pain    Thank you for choosing [facility]fusion [facility].  It is a pleasure to serve you.    Outpatient Infusion Unit  [phone]

## 2025-04-10 ENCOUNTER — TELEPHONE (OUTPATIENT)
Dept: GASTROENTEROLOGY | Age: 75
End: 2025-04-10

## 2025-04-10 NOTE — TELEPHONE ENCOUNTER
Pt called in stating  used to prescribe medication Vedolizumab (Entyvio) 300 Mg. Pt states the infusion center needs a new order before pt next infusion. I told pt that it looks like this medication is coming from an outside source but pt said ana used to prescribe this and would like Provider to write a new order. I told pt I would send to provider.

## 2025-04-14 NOTE — TELEPHONE ENCOUNTER
Patient needs written order -[facility] the En[facility]vio. Will have Dr. Petersen write up a new order on 4/16/25 and I will fax it to them.

## 2025-05-01 RX ORDER — SODIUM CHLORIDE 0.9 % (FLUSH) 0.9 %
5-40 SYRINGE (ML) INJECTION PRN
OUTPATIENT
Start: 2025-06-02

## 2025-05-06 ENCOUNTER — HOSPITAL ENCOUNTER (OUTPATIENT)
Dept: PHYSICAL THERAPY | Age: 75
Setting detail: THERAPIES SERIES
Discharge: HOME | End: 2025-05-06
Payer: MEDICARE

## 2025-05-06 PROCEDURE — 97161 PT EVAL LOW COMPLEX 20 MIN: CPT

## 2025-05-06 PROCEDURE — 97110 THERAPEUTIC EXERCISES: CPT

## 2025-05-06 NOTE — PLAN OF CARE
Outpatient Physical Therapy           [place]           [x] Phone: [phone]   Fax: [phone]  [place]           [] Phone: [phone]   Fax: [phone]     To:  Valery Nj CNP   From: Aissatou James, PT, DPT     Patient: Tuan Lea       : 1950  Diagnosis:  Diagnosis: R shoulder pain  Treatment Diagnosis: R shoulder pain, RUE weakness  Date: 2025    Physical Therapy Certification/Re-Certification Form  Dear Valery Nj,  The following patient has been evaluated for physical therapy services and for therapy to continue, insurance requires physician review of the treatment plan initially and every 90 days. Please review the attached evaluation and/or summary of the patient's plan of care, and verify that you agree therapy should continue by signing the attached document and sending it back to our office.    Assessment:    Assessment: Pt is a 74-year-old male who presents to therapy with chronic R shoulder and upper arm pain that worsened after a fall in 2024. Upon assessment, pt did have WFL AROM of R shoulder but does present with RUE weakness impairing his OH motion and ADL/ IADL activity. No s/s of impingement or labral involvement but pt does have a positive empty can and speed's test indicating some irritation of both RTC and bicep. Pt would benefit from skilled therapy interventions to address listed impairments, progress toward goal completion, and improve ADL/IADL status. PT also warranted to reduce risk for further injury or decline.  Patient agrees with established plan of care and assisted in the development of their short term and long term goals. Patient had no adverse reaction with initial treatment and there are no barriers to learning.  Learning preferences include demonstration, practice, and handouts.  Patient expressed understanding of HEP and appears to be motivated to participate in an active PT program including compliance with HEP expectations.

## 2025-05-06 NOTE — PROGRESS NOTES
Physical Therapy: Initial Evaluation    Patient: Tuan Lea (74 y.o. male)   Examination Date: 2025  Plan of Care Certification Period: 2025 to        :  1950 ;    Confirmed: Y MRN: [id number]  CSN: [id number]   Insurance: Payor: MEDICARE / Plan: MEDICARE PART A AND B / Product Type: *No Product type* /   Insurance ID: [id number] - (Medicare) Secondary Insurance (if applicable): RODRIGUEZ   Referring Physician: Valery Nj APRN - CNP Laura McNeil, CNP   PCP: Valery Nj APRN - CNP Visits to Date/Visits Approved:   /      No Show/Cancelled Appts:   /       Medical Diagnosis: Shoulder pain, right [M25.511] R shoulder pain  Treatment Diagnosis: R shoulder pain, RUE weakness     PERTINENT MEDICAL HISTORY   Patient Assessed for Rehabilitation Services: Yes       Medical History: Chart Reviewed: Yes   Past Medical History:   Diagnosis Date    Acute renal failure with tubular necrosis 2021    Anxiety, generalized     Crohn's disease of small and large intestines with complication (HCC) 2021    Depression     GERD (gastroesophageal reflux disease)     Hepatitis     hepatitis when a child    High cholesterol     Hx of echocardiogram 2022    No significant valvular disease noted. Dilation of ascending aorta(3.8cm) . Normal diastolic filling pattern for age.    Liver disease     Primary hypertension 2022    Psychiatric problem      Surgical History:   Past Surgical History:   Procedure Laterality Date    ABDOMEN SURGERY      bowel obstruction times three    ARM SURGERY Left 2024    HUMERUS OPEN REDUCTION INTERNAL FIXATION performed by Alexey Sears DO at [facility] OR    COLONOSCOPY      DILATATION, ESOPHAGUS      ENDOSCOPY, COLON, DIAGNOSTIC      ERCP Bilateral 2023    ERCP SPHINCTER/PAPILLOTOMY WITH  A SWEEP OF CBD AND REMOVAL OF  SLUDGE performed by Alexey Higginbotham MD at [facility] ENDOSCOPY    EYE SURGERY      cataract    PROCTOSIGMOIDOSCOPY N/A 2023

## 2025-05-06 NOTE — FLOWSHEET NOTE
Outpatient Physical Therapy  [place]           [x] Phone: [phone]   Fax: [phone]  [place]           [] Phone: [phone]   Fax: [phone]        Physical Therapy Daily Treatment Note  Date:  2025    Patient Name:  Tuan Lea    :  1950  MRN: [id number]  Restrictions/Precautions: n/a  Diagnosis:    Diagnosis: R shoulder pain  Treatment Diagnosis:  R shoulder pain, RUE weakness  Insurance/Certification information: Medicare - [email or web address]  Referring Physician:   Valery Nj CNP   Plan of care signed (Y/N):  sent   Outcome Measure: Quick Dash: 47.7%  Visit# / total visits:   1/10  Pain level: 0/10   Goals:     Patient goals: reduce R shoulder pain and improve function  Short term goals  Time Frame for Short term goals: Refer to [facility]                 Long Term Goals  Time Frame for Long Term Goals: 10 visits  Pt will improve Quick Dash to 37% or less to show MDC and improved subjective report in condition  Pt will improve R shoulder flexion strength to 4/5 to aide in OH ADLs  Pt will improve R shoulder abd strength to 4+/5 to aide in ADLs  Pt will improve R shoulder ER strength to 4+/5 to aide in IADL tasks         Summary of Evaluation:  Assessment: Pt is a 74-year-old male who presents to therapy with chronic R shoulder and upper arm pain that worsened after a fall in 2024. Upon assessment, pt did have WFL AROM of R shoulder but does present with RUE weakness impairing his OH motion and ADL/ IADL activity. No s/s of impingement or labral involvement but pt does have a positive empty can and speed's test indicating some irritation of both RTC and bicep. Pt would benefit from skilled therapy interventions to address listed impairments, progress toward goal completion, and improve ADL/IADL status. PT also warranted to reduce risk for further injury or decline.        Subjective:  See eval         Any changes in Ambulatory Summary Sheet?  None        Objective:  See eval

## 2025-05-08 ENCOUNTER — HOSPITAL ENCOUNTER (OUTPATIENT)
Dept: PHYSICAL THERAPY | Age: 75
Setting detail: THERAPIES SERIES
Discharge: HOME | End: 2025-05-08
Payer: MEDICARE

## 2025-05-08 PROCEDURE — 97140 MANUAL THERAPY 1/> REGIONS: CPT

## 2025-05-08 PROCEDURE — 97110 THERAPEUTIC EXERCISES: CPT

## 2025-05-08 NOTE — FLOWSHEET NOTE
Outpatient Physical Therapy  [place]           [x] Phone: [phone]   Fax: [phone]  [place]           [] Phone: [phone]   Fax: [phone]        Physical Therapy Daily Treatment Note  Date:  2025    Patient Name:  Tuan Lea                         :  1950                     MRN: [id number]  Restrictions/Precautions: n/a  Diagnosis:    Diagnosis: R shoulder pain  Treatment Diagnosis:  R shoulder pain, RUE weakness  Insurance/Certification information: Medicare - [email or web address]  Referring Physician:   Valery Nj CNP   Plan of care signed (Y/N):  sent   Outcome Measure: Quick Dash: 47.7%  Visit# / total visits:   2/10  Pain level:      0/10       Goals:     Patient goals: reduce R shoulder pain and improve function  Short term goals  Time Frame for Short term goals: Refer to [facility]  Long Term Goals  Time Frame for Long Term Goals: 10 visits  Pt will improve Quick Dash to 37% or less to show MDC and improved subjective report in condition  Pt will improve R shoulder flexion strength to 4/5 to aide in OH ADLs  Pt will improve R shoulder abd strength to 4+/5 to aide in ADLs  Pt will improve R shoulder ER strength to 4+/5 to aide in IADL tasks        Summary of Evaluation:  Assessment: Pt is a 74-year-old male who presents to therapy with chronic R shoulder and upper arm pain that worsened after a fall in 2024. Upon assessment, pt did have WFL AROM of R shoulder but does present with RUE weakness impairing his OH motion and ADL/ IADL activity. No s/s of impingement or labral involvement but pt does have a positive empty can and speed's test indicating some irritation of both RTC and bicep. Pt would benefit from skilled therapy interventions to address listed impairments, progress toward goal completion, and improve ADL/IADL status. PT also warranted to reduce risk for further injury or decline.      Subjective:   Pt arrives to tx session reporting 0/10 pain. Reports

## 2025-05-13 ENCOUNTER — HOSPITAL ENCOUNTER (OUTPATIENT)
Dept: PHYSICAL THERAPY | Age: 75
Setting detail: THERAPIES SERIES
Discharge: HOME | End: 2025-05-13
Payer: MEDICARE

## 2025-05-13 PROCEDURE — 97110 THERAPEUTIC EXERCISES: CPT

## 2025-05-13 NOTE — FLOWSHEET NOTE
Outpatient Physical Therapy  [place]           [x] Phone: [phone]   Fax: [phone]  [place]           [] Phone: [phone]   Fax: [phone]        Physical Therapy Daily Treatment Note  Date:  2025    Patient Name:  Tuan Lea                         :  1950                     MRN: [id number]  Restrictions/Precautions: n/a  Diagnosis:    Diagnosis: R shoulder pain  Treatment Diagnosis:  R shoulder pain, RUE weakness  Insurance/Certification information: Medicare - [employer]  Referring Physician:   Valery Nj CNP   Plan of care signed (Y/N):  sent   Outcome Measure: Quick Dash: 47.7%  Visit# / total visits:   2/10  Pain level:      0/10       Goals:     Patient goals: reduce R shoulder pain and improve function  Short term goals  Time Frame for Short term goals: Refer to [facility]  Long Term Goals  Time Frame for Long Term Goals: 10 visits  Pt will improve Quick Dash to 37% or less to show MDC and improved subjective report in condition  Pt will improve R shoulder flexion strength to 4/5 to aide in OH ADLs  Pt will improve R shoulder abd strength to 4+/5 to aide in ADLs  Pt will improve R shoulder ER strength to 4+/5 to aide in IADL tasks        Summary of Evaluation:  Assessment: Pt is a 74-year-old male who presents to therapy with chronic R shoulder and upper arm pain that worsened after a fall in 2024. Upon assessment, pt did have WFL AROM of R shoulder but does present with RUE weakness impairing his OH motion and ADL/ IADL activity. No s/s of impingement or labral involvement but pt does have a positive empty can and speed's test indicating some irritation of both RTC and bicep. Pt would benefit from skilled therapy interventions to address listed impairments, progress toward goal completion, and improve ADL/IADL status. PT also warranted to reduce risk for further injury or decline.      Subjective:   Pt arrives to tx session reporting 0/10 pain. Reports

## 2025-05-15 ENCOUNTER — HOSPITAL ENCOUNTER (OUTPATIENT)
Dept: PHYSICAL THERAPY | Age: 75
Setting detail: THERAPIES SERIES
Discharge: HOME | End: 2025-05-15
Payer: MEDICARE

## 2025-05-15 PROCEDURE — 97110 THERAPEUTIC EXERCISES: CPT

## 2025-05-15 NOTE — FLOWSHEET NOTE
Outpatient Physical Therapy  [place]           [x] Phone: [phone]   Fax: [phone]  [place]           [] Phone: [phone]   Fax: [phone]        Physical Therapy Daily Treatment Note  Date:  5/15/2025    Patient Name:  Tuan Lea                         :  1950                     MRN: [id number]  Restrictions/Precautions: n/a  Diagnosis:    Diagnosis: R shoulder pain  Treatment Diagnosis:  R shoulder pain, RUE weakness  Insurance/Certification information: Medicare - "[employer]  Referring Physician:   Valery Nj CNP   Plan of care signed (Y/N):  sent   Outcome Measure: Quick Dash: 47.7%  Visit# / total visits:   3/10  Pain level:      0/10       Goals:     Patient goals: reduce R shoulder pain and improve function  Short term goals  Time Frame for Short term goals: Refer to [facility]  Long Term Goals  Time Frame for Long Term Goals: 10 visits  Pt will improve Quick Dash to 37% or less to show MDC and improved subjective report in condition  Pt will improve R shoulder flexion strength to 4/5 to aide in OH ADLs  Pt will improve R shoulder abd strength to 4+/5 to aide in ADLs  Pt will improve R shoulder ER strength to 4+/5 to aide in IADL tasks        Summary of Evaluation:  Assessment: Pt is a 74-year-old male who presents to therapy with chronic R shoulder and upper arm pain that worsened after a fall in 2024. Upon assessment, pt did have WFL AROM of R shoulder but does present with RUE weakness impairing his OH motion and ADL/ IADL activity. No s/s of impingement or labral involvement but pt does have a positive empty can and speed's test indicating some irritation of both RTC and bicep. Pt would benefit from skilled therapy interventions to address listed impairments, progress toward goal completion, and improve ADL/IADL status. PT also warranted to reduce risk for further injury or decline.      Subjective:   Pt arrives to tx session reporting 1-2/10 pain soreness.

## 2025-05-20 ENCOUNTER — HOSPITAL ENCOUNTER (OUTPATIENT)
Dept: PHYSICAL THERAPY | Age: 75
Setting detail: THERAPIES SERIES
Discharge: HOME | End: 2025-05-20
Payer: MEDICARE

## 2025-05-20 PROCEDURE — 97110 THERAPEUTIC EXERCISES: CPT

## 2025-05-20 NOTE — FLOWSHEET NOTE
Outpatient Physical Therapy  [place]           [x] Phone: [phone]   Fax: [phone]  [place]           [] Phone: [phone]   Fax: [phone]        Physical Therapy Daily Treatment Note  Date:  2025    Patient Name:  Tuan Lea                         :  1950                     MRN: [id number]  Restrictions/Precautions: n/a  Diagnosis:    Diagnosis: R shoulder pain  Treatment Diagnosis:  R shoulder pain, RUE weakness  Insurance/Certification information: Medicare - [employer]  Referring Physician:   Valery Nj CNP   Plan of care signed (Y/N):  sent   Outcome Measure: Quick Dash: 47.7%  Visit# / total visits:   4/10  Pain level:      1/10 shoulder 6/10 right LB      Goals:     Patient goals: reduce R shoulder pain and improve function  Short term goals  Time Frame for Short term goals: Refer to [facility]  Long Term Goals  Time Frame for Long Term Goals: 10 visits  Pt will improve Quick Dash to 37% or less to show MDC and improved subjective report in condition  Pt will improve R shoulder flexion strength to 4/5 to aide in OH ADLs  Pt will improve R shoulder abd strength to 4+/5 to aide in ADLs  Pt will improve R shoulder ER strength to 4+/5 to aide in IADL tasks        Summary of Evaluation:  Assessment: Pt is a 74-year-old male who presents to therapy with chronic R shoulder and upper arm pain that worsened after a fall in 2024. Upon assessment, pt did have WFL AROM of R shoulder but does present with RUE weakness impairing his OH motion and ADL/ IADL activity. No s/s of impingement or labral involvement but pt does have a positive empty can and speed's test indicating some irritation of both RTC and bicep. Pt would benefit from skilled therapy interventions to address listed impairments, progress toward goal completion, and improve ADL/IADL status. PT also warranted to reduce risk for further injury or decline.      Subjective:   Pt stated his shoulder is fine 1/10

## 2025-05-21 ENCOUNTER — HOSPITAL ENCOUNTER (OUTPATIENT)
Age: 75
Discharge: HOME | End: 2025-05-21
Payer: MEDICARE

## 2025-05-21 DIAGNOSIS — K50.814 CROHN'S DISEASE OF BOTH SMALL AND LARGE INTESTINE WITH ABSCESS (HCC): ICD-10-CM

## 2025-05-21 DIAGNOSIS — K50.80 CROHN'S DISEASE OF BOTH SMALL AND LARGE INTESTINE WITHOUT COMPLICATION (HCC): ICD-10-CM

## 2025-05-21 LAB
25(OH)D3 SERPL-MCNC: 45.6 NG/ML (ref 30–150)
CRP SERPL HS-MCNC: <3 MG/L (ref 0–5)

## 2025-05-21 PROCEDURE — 82306 VITAMIN D 25 HYDROXY: CPT

## 2025-05-21 PROCEDURE — 36415 COLL VENOUS BLD VENIPUNCTURE: CPT

## 2025-05-21 PROCEDURE — 86708 HEPATITIS A ANTIBODY: CPT

## 2025-05-21 PROCEDURE — 86140 C-REACTIVE PROTEIN: CPT

## 2025-05-22 ENCOUNTER — HOSPITAL ENCOUNTER (OUTPATIENT)
Age: 75
Setting detail: SPECIMEN
Discharge: HOME | End: 2025-05-22
Payer: MEDICARE

## 2025-05-22 ENCOUNTER — RESULTS FOLLOW-UP (OUTPATIENT)
Dept: GASTROENTEROLOGY | Age: 75
End: 2025-05-22

## 2025-05-22 PROCEDURE — 83993 ASSAY FOR CALPROTECTIN FECAL: CPT

## 2025-05-23 ENCOUNTER — HOSPITAL ENCOUNTER (OUTPATIENT)
Dept: PHYSICAL THERAPY | Age: 75
Setting detail: THERAPIES SERIES
Discharge: HOME | End: 2025-05-23
Payer: MEDICARE

## 2025-05-23 LAB — HEP A AB: POSITIVE

## 2025-05-23 PROCEDURE — 97110 THERAPEUTIC EXERCISES: CPT

## 2025-05-23 NOTE — FLOWSHEET NOTE
1-3 sets - 10 reps  - Supine Scapular Protraction in Flexion with Dumbbells  - 1 x daily - 7 x weekly - 1-3 sets - 10 reps    Home Exercise Program:  Issued, practiced and pt demo ability to perform 5/6/2025    Manual Treatments: none    Modalities:  none    Communication with other providers:  POC sent    Assessment:  Pt tolerated today's treatment without any adverse reactions or complications this date. Pt did well with added exercises, muscles fatigue and strain but no sharp pain. Pt would continue to benefit from skilled therapy interventions to address remaining impairments, improve mobility and strength and progress toward goal completion while reducing risk for re-injury or further decline.  End pain: same    Plan for Next Session: UBE, strength and stability of both bicep and RTC, body blade    Time In / Time Out: 1115 - 1153    Timed Code/Total Treatment Minutes: 38': 38' TE x3    Next Progress Note due:  10th visit    Plan of Care Interventions:  [x] Therapeutic Exercise  [x] Modalities:  [x] Therapeutic Activity     [] Ultrasound  [] Estim  [] Gait Training      [] Cervical Traction [] Lumbar Traction  [x] Neuromuscular Re-education    [x] Cold/hotpack [] Iontophoresis   [x] Instruction in HEP      [x] Vasopneumatic   [] Dry Needling    [x] Manual Therapy               [] Aquatic Therapy              Electronically signed by:  Aissatou James PT, DPT  5/23/2025, 8:37 AM

## 2025-05-25 LAB — CALPROTECTIN, FECAL: 165 UG/G

## 2025-05-26 ENCOUNTER — RESULTS FOLLOW-UP (OUTPATIENT)
Dept: GASTROENTEROLOGY | Age: 75
End: 2025-05-26

## 2025-05-28 ENCOUNTER — HOSPITAL ENCOUNTER (OUTPATIENT)
Dept: PHYSICAL THERAPY | Age: 75
Setting detail: THERAPIES SERIES
Discharge: HOME | End: 2025-05-28
Payer: MEDICARE

## 2025-05-28 PROCEDURE — 97110 THERAPEUTIC EXERCISES: CPT

## 2025-05-28 NOTE — FLOWSHEET NOTE
Outpatient Physical Therapy  [place]           [x] Phone: [phone]   Fax: [phone]  [place]           [] Phone: [phone]   Fax: [phone]        Physical Therapy Daily Treatment Note  Date:  2025    Patient Name:  Tuan Lea                         :  1950                     MRN: [id number]  Restrictions/Precautions: n/a  Diagnosis:    Diagnosis: R shoulder pain  Treatment Diagnosis:  R shoulder pain, RUE weakness  Insurance/Certification information: Medicare - [employer]  Referring Physician:   Valrey Nj CNP   Plan of care signed (Y/N):  sent   Outcome Measure: Quick Dash: 47.7%  Visit# / total visits:   7/10  Pain level:      1-2/10 shoulder        Goals:     Patient goals: reduce R shoulder pain and improve function  Short term goals  Time Frame for Short term goals: Refer to [facility]  Long Term Goals  Time Frame for Long Term Goals: 10 visits  Pt will improve Quick Dash to 37% or less to show MDC and improved subjective report in condition  Pt will improve R shoulder flexion strength to 4/5 to aide in OH ADLs  Pt will improve R shoulder abd strength to 4+/5 to aide in ADLs  Pt will improve R shoulder ER strength to 4+/5 to aide in IADL tasks        Summary of Evaluation:  Assessment: Pt is a 74-year-old male who presents to therapy with chronic R shoulder and upper arm pain that worsened after a fall in 2024. Upon assessment, pt did have WFL AROM of R shoulder but does present with RUE weakness impairing his OH motion and ADL/ IADL activity. No s/s of impingement or labral involvement but pt does have a positive empty can and speed's test indicating some irritation of both RTC and bicep. Pt would benefit from skilled therapy interventions to address listed impairments, progress toward goal completion, and improve ADL/IADL status. PT also warranted to reduce risk for further injury or decline.      Subjective:  Shadi arrives to therapy reporting that the

## 2025-05-30 ENCOUNTER — HOSPITAL ENCOUNTER (OUTPATIENT)
Dept: PHYSICAL THERAPY | Age: 75
Setting detail: THERAPIES SERIES
Discharge: HOME | End: 2025-05-30
Payer: MEDICARE

## 2025-05-30 PROCEDURE — 97110 THERAPEUTIC EXERCISES: CPT

## 2025-05-30 PROCEDURE — 97016 VASOPNEUMATIC DEVICE THERAPY: CPT

## 2025-05-30 NOTE — FLOWSHEET NOTE
Outpatient Physical Therapy  [place]           [x] Phone: [phone]   Fax: [phone]  [place]           [] Phone: [phone]   Fax: [phone]        Physical Therapy Daily Treatment Note  Date:  2025    Patient Name:  Tuan Lea                         :  1950                     MRN: [id number]  Restrictions/Precautions: n/a  Diagnosis:    Diagnosis: R shoulder pain  Treatment Diagnosis:  R shoulder pain, RUE weakness  Insurance/Certification information: Medicare - [employer]  Referring Physician:   Valery Nj CNP   Plan of care signed (Y/N):  sent   Outcome Measure: Quick Dash: 47.7%  Visit# / total visits:   8/10  Pain level:      1-2/10 shoulder        Goals:     Patient goals: reduce R shoulder pain and improve function  Short term goals  Time Frame for Short term goals: Refer to [facility]  Long Term Goals  Time Frame for Long Term Goals: 10 visits  Pt will improve Quick Dash to 37% or less to show MDC and improved subjective report in condition  Pt will improve R shoulder flexion strength to 4/5 to aide in OH ADLs  Pt will improve R shoulder abd strength to 4+/5 to aide in ADLs  Pt will improve R shoulder ER strength to 4+/5 to aide in IADL tasks        Summary of Evaluation:  Assessment: Pt is a 74-year-old male who presents to therapy with chronic R shoulder and upper arm pain that worsened after a fall in 2024. Upon assessment, pt did have WFL AROM of R shoulder but does present with RUE weakness impairing his OH motion and ADL/ IADL activity. No s/s of impingement or labral involvement but pt does have a positive empty can and speed's test indicating some irritation of both RTC and bicep. Pt would benefit from skilled therapy interventions to address listed impairments, progress toward goal completion, and improve ADL/IADL status. PT also warranted to reduce risk for further injury or decline.      Subjective:   Shadi arrives to therapy stating that the shoulder

## 2025-06-03 ENCOUNTER — HOSPITAL ENCOUNTER (OUTPATIENT)
Dept: INFUSION THERAPY | Age: 75
Setting detail: INFUSION SERIES
Discharge: HOME | End: 2025-06-03
Payer: MEDICARE

## 2025-06-03 VITALS
OXYGEN SATURATION: 97 % | RESPIRATION RATE: 16 BRPM | SYSTOLIC BLOOD PRESSURE: 123 MMHG | TEMPERATURE: 97.5 F | HEART RATE: 58 BPM | DIASTOLIC BLOOD PRESSURE: 66 MMHG

## 2025-06-03 DIAGNOSIS — K50.814 CROHN'S DISEASE OF BOTH SMALL AND LARGE INTESTINE WITH ABSCESS (HCC): Primary | ICD-10-CM

## 2025-06-03 PROCEDURE — 2500000003 HC RX 250 WO HCPCS: Performed by: INTERNAL MEDICINE

## 2025-06-03 PROCEDURE — 96365 THER/PROPH/DIAG IV INF INIT: CPT

## 2025-06-03 PROCEDURE — 6360000002 HC RX W HCPCS: Performed by: INTERNAL MEDICINE

## 2025-06-03 PROCEDURE — 2580000003 HC RX 258: Performed by: INTERNAL MEDICINE

## 2025-06-03 RX ORDER — SODIUM CHLORIDE 0.9 % (FLUSH) 0.9 %
5-40 SYRINGE (ML) INJECTION PRN
Status: DISCONTINUED | OUTPATIENT
Start: 2025-06-03 | End: 2025-06-04 | Stop reason: HOSPADM

## 2025-06-03 RX ORDER — SODIUM CHLORIDE 0.9 % (FLUSH) 0.9 %
5-40 SYRINGE (ML) INJECTION PRN
OUTPATIENT
Start: 2025-07-29

## 2025-06-03 RX ADMIN — SODIUM CHLORIDE, PRESERVATIVE FREE 30 ML: 5 INJECTION INTRAVENOUS at 10:55

## 2025-06-03 RX ADMIN — VEDOLIZUMAB 300 MG: 300 INJECTION, POWDER, LYOPHILIZED, FOR SOLUTION INTRAVENOUS at 10:25

## 2025-06-03 RX ADMIN — SODIUM CHLORIDE, PRESERVATIVE FREE 10 ML: 5 INJECTION INTRAVENOUS at 09:55

## 2025-06-03 NOTE — DISCHARGE INSTRUCTIONS
Continue same medications, diet, and activity.        Seek medical help for signs and symptoms of an allergic reaction; hives, chills, rash,chest pain, difficulty breathing, or swelling of face and/or throat.      Talk to your Doctor before receiving any vaccinations or starting any new medicines.        Thank you for choosing [facility]. It is our pleasure to serve you.        [facility]   [phone]  8AM-5PM

## 2025-06-03 NOTE — PROGRESS NOTES
Ambulatory to unit room 2 for Entyvio.Reorientated to unit.Procedure and plan of care explained.Questions answered.Understanding verbalized.Tolerated  well.Reviewed discharge instructions, understanding verbalized.Copies of AVS given to take home. Patient discharged home.Down to exit per self.    Orders Placed This Encounter   Medications    vedolizumab (ENTYVIO) 300 mg in sodium chloride 0.9 % 250 mL infusion    sodium chloride flush 0.9 % injection 5-40 mL

## 2025-06-04 ENCOUNTER — HOSPITAL ENCOUNTER (OUTPATIENT)
Dept: PHYSICAL THERAPY | Age: 75
Setting detail: INFUSION SERIES
Discharge: HOME | End: 2025-06-04
Payer: MEDICARE

## 2025-06-04 PROCEDURE — 97016 VASOPNEUMATIC DEVICE THERAPY: CPT

## 2025-06-04 PROCEDURE — 97110 THERAPEUTIC EXERCISES: CPT

## 2025-06-04 NOTE — FLOWSHEET NOTE
Outpatient Physical Therapy  [place]           [x] Phone: [phone]   Fax: [phone]  [place]           [] Phone: [phone]   Fax: [phone]        Physical Therapy Daily Treatment Note  Date:  2025    Patient Name:  Tuan Lea                         :  1950                     MRN: [id number]  Restrictions/Precautions: n/a  Diagnosis:    Diagnosis: R shoulder pain  Treatment Diagnosis:  R shoulder pain, RUE weakness  Insurance/Certification information: Medicare - [employer]  Referring Physician:   Valery Nj CNP   Plan of care signed (Y/N):  Yes  Outcome Measure: Quick Dash: 47.7%  Visit# / total visits:   9/10  Pain level:      1-10 shoulder        Goals:     Patient goals: reduce R shoulder pain and improve function  Short term goals  Time Frame for Short term goals: Refer to [facility]  Long Term Goals  Time Frame for Long Term Goals: 10 visits  Pt will improve Quick Dash to 37% or less to show MDC and improved subjective report in condition  Pt will improve R shoulder flexion strength to 4/5 to aide in OH ADLs  Pt will improve R shoulder abd strength to 4+/5 to aide in ADLs  Pt will improve R shoulder ER strength to 4+/5 to aide in IADL tasks        Summary of Evaluation:  Assessment: Pt is a 74-year-old male who presents to therapy with chronic R shoulder and upper arm pain that worsened after a fall in 2024. Upon assessment, pt did have WFL AROM of R shoulder but does present with RUE weakness impairing his OH motion and ADL/ IADL activity. No s/s of impingement or labral involvement but pt does have a positive empty can and speed's test indicating some irritation of both RTC and bicep. Pt would benefit from skilled therapy interventions to address listed impairments, progress toward goal completion, and improve ADL/IADL status. PT also warranted to reduce risk for further injury or decline.      Subjective:   Pt report lifting his arm is still heavy to lift . Some

## 2025-06-06 ENCOUNTER — HOSPITAL ENCOUNTER (OUTPATIENT)
Dept: PHYSICAL THERAPY | Age: 75
Setting detail: INFUSION SERIES
Discharge: HOME | End: 2025-06-06
Payer: MEDICARE

## 2025-06-06 PROCEDURE — 97110 THERAPEUTIC EXERCISES: CPT

## 2025-06-06 NOTE — PROGRESS NOTES
Outpatient Physical Therapy           [place]           [x] Phone: [phone]   Fax: [phone]  [place]           [] Phone: [phone]   Fax: [phone]      To: Valery Nj CNP    From: Aissatou James, PT, DPT     Patient: Tuan Lea                  : 1950  Diagnosis:  R shoulder pain      Treatment Diagnosis:     R shoulder pain, RUE weakness   Date: 2025  [x]  Progress Note                []  Discharge Note    Evaluation Date:  25 Total Visits to date:  10  Cancels/No-shows to date:  0    Subjective:    Pt notes that since starting therapy, the pain has reduced. He feels his muscular control has improved since eval but is not quite where he would like it to be yet. In general, he is sleeping better. He can use the RUE more now than prior.  Quick Dash: 38.6%    Plan of Care/Treatment to date:  [x] Therapeutic Exercise    [x] Modalities:  [x] Therapeutic Activity     [] Ultrasound  [] Electrical Stimulation  [] Gait Training      [] Cervical Traction   [] Lumbar Traction  [x] Neuromuscular Re-education  [x] Cold/hotpack [] Iontophoresis  [x] Instruction in HEP      Other:  [x] Manual Therapy       [x]  Vasopneumatic  [] Aquatic Therapy       []   Dry Needle Therapy                      Objective/Significant Findings At Last Visit/Comments:    R shoulder flexion strength: 4/5  R shoulder abduction strength: 4/5  R shoulder ER strength: 4+/5    Assessment:   Pt has shown good progress since eval with improved RUE strength, reduced pain and improved subjective report. He is showing good goal progression and can do more at home. Pt to switch to 1x/week and supplement with HEP the other days to prepare for dc. Pt would continue to benefit from skilled therapy interventions to address remaining impairments, improve mobility and strength and progress toward goal completion while reducing risk for re-injury or further decline.       Goal Status:  [] Achieved [x] Partially

## 2025-06-06 NOTE — FLOWSHEET NOTE
Outpatient Physical Therapy  [place]           [x] Phone: [phone]   Fax: [phone]  [place]           [] Phone: [phone]   Fax: [phone]        Physical Therapy Daily Treatment Note  Date:  2025    Patient Name:  Tuan Lea             :  1950                     MRN: [id number]  Restrictions/Precautions: n/a  Diagnosis:  R shoulder pain  Treatment Diagnosis:  R shoulder pain, RUE weakness  Insurance/Certification information: Medicare - [employer]  Referring Physician:   Valery Nj CNP   Plan of care signed (Y/N):  Yes  Outcome Measure: Quick Dash: 38.6%  Visit# / total visits:   10/15  Pain level:   2/10 shoulder    Goals:     Patient goals: reduce R shoulder pain and improve function: progressing   Short term goals  Time Frame for Short term goals: Refer to [facility]  Long Term Goals  Time Frame for Long Term Goals: 10 visits  Pt will improve Quick Dash to 37% or less to show MDC and improved subjective report in condition: Almost MET 6/6  Pt will improve R shoulder flexion strength to 4/5 to aide in OH ADLs: met /6  Pt will improve R shoulder abd strength to 4+/5 to aide in ADLs: almost met /6  Pt will improve R shoulder ER strength to 4+/5 to aide in IADL tasks:  met 6/6        Summary of Evaluation:  Assessment: Pt is a 74-year-old male who presents to therapy with chronic R shoulder and upper arm pain that worsened after a fall in 2024. Upon assessment, pt did have WFL AROM of R shoulder but does present with RUE weakness impairing his OH motion and ADL/ IADL activity. No s/s of impingement or labral involvement but pt does have a positive empty can and speed's test indicating some irritation of both RTC and bicep. Pt would benefit from skilled therapy interventions to address listed impairments, progress toward goal completion, and improve ADL/IADL status. PT also warranted to reduce risk for further injury or decline.      Subjective:  Pt notes that since

## 2025-06-06 NOTE — DISCHARGE SUMMARY
Outpatient Physical Therapy           [place]           [x] Phone: [phone]   Fax: [phone]  [place]           [] Phone: [phone]   Fax: [phone]      To: Valery Nj CNP    From: Aissatou James, PT, DPT     Patient: Tuan Lea                  : 1950  Diagnosis:  R shoulder pain      Treatment Diagnosis:     R shoulder pain, RUE weakness   Date: 2025  []  Progress Note                [x]  Discharge Note    Evaluation Date:  25 Total Visits to date:  10  Cancels/No-shows to date:  0    Subjective:    Pt notes that since starting therapy, the pain has reduced. He feels his muscular control has improved since eval but is not quite where he would like it to be yet. In general, he is sleeping better. He can use the RUE more now than prior.  Quick Dash: 38.6%    Plan of Care/Treatment to date:  [x] Therapeutic Exercise    [x] Modalities:  [x] Therapeutic Activity     [] Ultrasound  [] Electrical Stimulation  [] Gait Training      [] Cervical Traction   [] Lumbar Traction  [x] Neuromuscular Re-education  [x] Cold/hotpack [] Iontophoresis  [x] Instruction in HEP      Other:  [x] Manual Therapy       [x]  Vasopneumatic  [] Aquatic Therapy       []   Dry Needle Therapy                      Objective/Significant Findings At Last Visit/Comments:    R shoulder flexion strength: 4/5  R shoulder abduction strength: 4/5  R shoulder ER strength: 4+/5    Assessment:   Pt has shown good progress since eval with improved RUE strength, reduced pain and improved subjective report. He is showing good goal progression and can do more at home. Pt to switch to 1x/week and supplement with HEP the other days to prepare for dc. Pt would continue to benefit from skilled therapy interventions to address remaining impairments, improve mobility and strength and progress toward goal completion while reducing risk for re-injury or further decline.       Goal Status:  [] Achieved [x] Partially

## 2025-06-10 ENCOUNTER — HOSPITAL ENCOUNTER (OUTPATIENT)
Dept: PHYSICAL THERAPY | Age: 75
Setting detail: INFUSION SERIES
Discharge: HOME | End: 2025-06-10
Payer: MEDICARE

## 2025-06-10 PROCEDURE — 97110 THERAPEUTIC EXERCISES: CPT

## 2025-06-10 NOTE — FLOWSHEET NOTE
Outpatient Physical Therapy  [place]           [x] Phone: [phone]   Fax: [phone]  [place]           [] Phone: [phone]   Fax: [phone]        Physical Therapy Daily Treatment Note  Date:  6/10/2025    Patient Name:  Tuan Lea             :  1950                     MRN: [id number]  Restrictions/Precautions: n/a  Diagnosis:  R shoulder pain  Treatment Diagnosis:  R shoulder pain, RUE weakness  Insurance/Certification information: Medicare - [employer]  Referring Physician:   Valery Nj CNP   Plan of care signed (Y/N):  Yes  Outcome Measure: Quick Dash: 38.6%  Visit# / total visits:   10/15  Pain level:   1-2/10 shoulder    Goals:     Patient goals: reduce R shoulder pain and improve function: progressing 6/6  Short term goals  Time Frame for Short term goals: Refer to [facility]  Long Term Goals  Time Frame for Long Term Goals: 10 visits  Pt will improve Quick Dash to 37% or less to show MDC and improved subjective report in condition: Almost MET 6/6  Pt will improve R shoulder flexion strength to 4/5 to aide in [place] ADLs: met 6/6  Pt will improve R shoulder abd strength to 4+/5 to aide in ADLs: almost met 6/6  Pt will improve R shoulder ER strength to 4+/5 to aide in IADL tasks:  met 6/6        Summary of Evaluation:  Assessment: Pt is a 74-year-old male who presents to therapy with chronic R shoulder and upper arm pain that worsened after a fall in 2024. Upon assessment, pt did have WFL AROM of R shoulder but does present with RUE weakness impairing his OH motion and ADL/ IADL activity. No s/s of impingement or labral involvement but pt does have a positive empty can and speed's test indicating some irritation of both RTC and bicep. Pt would benefit from skilled therapy interventions to address listed impairments, progress toward goal completion, and improve ADL/IADL status. PT also warranted to reduce risk for further injury or decline.      Subjective:  Pt reports that

## 2025-06-19 ENCOUNTER — HOSPITAL ENCOUNTER (OUTPATIENT)
Dept: PHYSICAL THERAPY | Age: 75
Setting detail: INFUSION SERIES
Discharge: HOME | End: 2025-06-19
Payer: MEDICARE

## 2025-06-19 PROCEDURE — 97140 MANUAL THERAPY 1/> REGIONS: CPT

## 2025-06-19 PROCEDURE — 97110 THERAPEUTIC EXERCISES: CPT

## 2025-06-19 NOTE — FLOWSHEET NOTE
Outpatient Physical Therapy  [place]           [x] Phone: [phone]   Fax: [phone]  [place]           [] Phone: [phone]   Fax: [phone]        Physical Therapy Daily Treatment Note  Date:  2025    Patient Name:  Tuan Lea             :  1950                     MRN: [id number]  Restrictions/Precautions: n/a  Diagnosis:  R shoulder pain  Treatment Diagnosis:  R shoulder pain, RUE weakness  Insurance/Certification information: Medicare - [employer]  Referring Physician:   Valery Nj CNP   Plan of care signed (Y/N):  Yes  Outcome Measure: Quick Dash: 38.6%    Visit# / total visits:   11 /15    Pain level:   1/10 shoulder      Goals:     Patient goals: reduce R shoulder pain and improve function: progressing 6/  Short term goals  Time Frame for Short term goals: Refer to [facility]  Long Term Goals  Time Frame for Long Term Goals: 10 visits  Pt will improve Quick Dash to 37% or less to show MDC and improved subjective report in condition: Almost MET 6/6  Pt will improve R shoulder flexion strength to 4/5 to aide in OH ADLs: met 6/6  Pt will improve R shoulder abd strength to 4+/5 to aide in ADLs: almost met 6/6  Pt will improve R shoulder ER strength to 4+/5 to aide in IADL tasks:  met 6/6        Summary of Evaluation:  Assessment: Pt is a 74-year-old male who presents to therapy with chronic R shoulder and upper arm pain that worsened after a fall in 2024. Upon assessment, pt did have WFL AROM of R shoulder but does present with RUE weakness impairing his OH motion and ADL/ IADL activity. No s/s of impingement or labral involvement but pt does have a positive empty can and speed's test indicating some irritation of both RTC and bicep. Pt would benefit from skilled therapy interventions to address listed impairments, progress toward goal completion, and improve ADL/IADL status. PT also warranted to reduce risk for further injury or decline.      Subjective:  Pt  states he

## 2025-06-26 ENCOUNTER — HOSPITAL ENCOUNTER (OUTPATIENT)
Dept: PHYSICAL THERAPY | Age: 75
Setting detail: INFUSION SERIES
Discharge: HOME | End: 2025-06-26
Payer: MEDICARE

## 2025-06-26 PROCEDURE — 97110 THERAPEUTIC EXERCISES: CPT

## 2025-06-26 PROCEDURE — 97140 MANUAL THERAPY 1/> REGIONS: CPT

## 2025-06-26 NOTE — FLOWSHEET NOTE
Outpatient Physical Therapy  [place]           [x] Phone: [phone]   Fax: [phone]  [place]           [] Phone: [phone]   Fax: [phone]        Physical Therapy Daily Treatment Note  Date:  2025    Patient Name:  Tuan Lea             :  1950                     MRN: [id number]  Restrictions/Precautions: n/a  Diagnosis:  R shoulder pain  Treatment Diagnosis:  R shoulder pain, RUE weakness  Insurance/Certification information: Medicare - [employer]  Referring Physician:   Valery Nj CNP   Plan of care signed (Y/N):  Yes  Outcome Measure: Quick Dash: 38.6%    Visit# / total visits:   12 /15    Pain level:   1-2 /10 shoulder      Goals:     Patient goals: reduce R shoulder pain and improve function: progressing 6/6  Short term goals  Time Frame for Short term goals: Refer to [facility]  Long Term Goals  Time Frame for Long Term Goals: 10 visits  Pt will improve Quick Dash to 37% or less to show MDC and improved subjective report in condition: Almost MET 6/6  Pt will improve R shoulder flexion strength to 4/5 to aide in OH ADLs: met 6/6  Pt will improve R shoulder abd strength to 4+/5 to aide in ADLs: almost met 6/6  Pt will improve R shoulder ER strength to 4+/5 to aide in IADL tasks:  met 6/6        Summary of Evaluation:  Assessment: Pt is a 74-year-old male who presents to therapy with chronic R shoulder and upper arm pain that worsened after a fall in 2024. Upon assessment, pt did have WFL AROM of R shoulder but does present with RUE weakness impairing his OH motion and ADL/ IADL activity. No s/s of impingement or labral involvement but pt does have a positive empty can and speed's test indicating some irritation of both RTC and bicep. Pt would benefit from skilled therapy interventions to address listed impairments, progress toward goal completion, and improve ADL/IADL status. PT also warranted to reduce risk for further injury or decline.      Subjective:  Pt  states

## 2025-06-30 ENCOUNTER — HOSPITAL ENCOUNTER (OUTPATIENT)
Dept: PHYSICAL THERAPY | Age: 75
Setting detail: INFUSION SERIES
Discharge: HOME | End: 2025-06-30
Payer: MEDICARE

## 2025-06-30 PROCEDURE — 97110 THERAPEUTIC EXERCISES: CPT

## 2025-06-30 NOTE — FLOWSHEET NOTE
Outpatient Physical Therapy  [place]           [x] Phone: [phone]   Fax: [phone]  [place]           [] Phone: [phone]   Fax: [phone]        Physical Therapy Daily Treatment Note  Date:  2025    Patient Name:  Tuan Lea             :  1950                     MRN: [id number]  Restrictions/Precautions: n/a  Diagnosis:  R shoulder pain  Treatment Diagnosis:  R shoulder pain, RUE weakness  Insurance/Certification information: Medicare - [facility]  Referring Physician:   Valery Nj CNP   Plan of care signed (Y/N):  Yes  Outcome Measure: Quick Dash: 25%  Visit# / total visits:   13 /15  Pain level:   1-2 /10 shoulder      Goals:     Patient goals: reduce R shoulder pain and improve function: MET   Short term goals  Time Frame for Short term goals: Refer to [facility]  Long Term Goals  Time Frame for Long Term Goals: 10 visits  Pt will improve Quick Dash to 37% or less to show MDC and improved subjective report in condition: ReMET   Pt will improve R shoulder flexion strength to 4/5 to aide in OH ADLs: Remet   Pt will improve R shoulder abd strength to 4+/5 to aide in ADLs: met   Pt will improve R shoulder ER strength to 4+/5 to aide in IADL tasks:  Remet         Summary of Evaluation:  Assessment: Pt is a 74-year-old male who presents to therapy with chronic R shoulder and upper arm pain that worsened after a fall in 2024. Upon assessment, pt did have WFL AROM of R shoulder but does present with RUE weakness impairing his OH motion and ADL/ IADL activity. No s/s of impingement or labral involvement but pt does have a positive empty can and speed's test indicating some irritation of both RTC and bicep. Pt would benefit from skilled therapy interventions to address listed impairments, progress toward goal completion, and improve ADL/IADL status. PT also warranted to reduce risk for further injury or decline.      Subjective: Pt is doing well this date.

## 2025-06-30 NOTE — DISCHARGE SUMMARY
Outpatient Physical Therapy           [place]           [x] Phone: [phone]   Fax: [phone]  [place]           [] Phone: [phone]   Fax: [phone]      To: Valery Nj CNP    From: Aissatou James, PT, DPT     Patient: Tuan Lea        : 1950  Diagnosis:  R shoulder pain      Treatment Diagnosis:     R shoulder pain, RUE weakness   Date: 2025  []  Progress Note                [x]  Discharge Note    Evaluation Date:  25 Total Visits to date:  14  Cancels/No-shows to date:  0    Subjective:  Pt is doing well this date. Overall, he feels he has improved by 75% since eval. His pain is managed pretty well now other than if he has some mild discomfort if he is carrying something heavy or reaching abnormally. He does not have pain at rest. He does feel his strength has improved. He can do most of his daily activities at home now without much issue. He feels comfortable at this point with his HEP at home.  Quick Dash: 25%      Plan of Care/Treatment to date:  [x] Therapeutic Exercise    [x] Modalities:  [x] Therapeutic Activity     [] Ultrasound  [] Electrical Stimulation  [] Gait Training      [] Cervical Traction   [] Lumbar Traction  [x] Neuromuscular Re-education  [x] Cold/hotpack [] Iontophoresis  [x] Instruction in HEP      Other:  [x] Manual Therapy       [x]  Vasopneumatic  [] Aquatic Therapy       []   Dry Needle Therapy                      Objective/Significant Findings At Last Visit/Comments:    R shoulder flexion strength: 4/5  R shoulder abduction strength: 4+/5  R shoulder ER strength: 4+/5       Assessment:  Pt has shown good progress since therapy start with reduced R shoulder discomfort and improved RUE strength. He has met all of his goal at this time and is not having any major limitations outside of clinic. PT educated and reviewed pt on proper HEP after dc extensively this date to ensure he was doing the proper things after today. PT educated pt on

## 2025-07-16 ENCOUNTER — OFFICE VISIT (OUTPATIENT)
Dept: GASTROENTEROLOGY | Age: 75
End: 2025-07-16
Payer: MEDICARE

## 2025-07-16 VITALS
OXYGEN SATURATION: 98 % | BODY MASS INDEX: 27.48 KG/M2 | SYSTOLIC BLOOD PRESSURE: 122 MMHG | HEIGHT: 66 IN | HEART RATE: 63 BPM | DIASTOLIC BLOOD PRESSURE: 68 MMHG | RESPIRATION RATE: 17 BRPM | WEIGHT: 171 LBS

## 2025-07-16 DIAGNOSIS — K21.9 GASTROESOPHAGEAL REFLUX DISEASE, UNSPECIFIED WHETHER ESOPHAGITIS PRESENT: ICD-10-CM

## 2025-07-16 DIAGNOSIS — K50.814 CROHN'S DISEASE OF BOTH SMALL AND LARGE INTESTINE WITH ABSCESS (HCC): Primary | ICD-10-CM

## 2025-07-16 PROCEDURE — G8417 CALC BMI ABV UP PARAM F/U: HCPCS | Performed by: INTERNAL MEDICINE

## 2025-07-16 PROCEDURE — 1159F MED LIST DOCD IN RCRD: CPT | Performed by: INTERNAL MEDICINE

## 2025-07-16 PROCEDURE — 1036F TOBACCO NON-USER: CPT | Performed by: INTERNAL MEDICINE

## 2025-07-16 PROCEDURE — 3078F DIAST BP <80 MM HG: CPT | Performed by: INTERNAL MEDICINE

## 2025-07-16 PROCEDURE — 1124F ACP DISCUSS-NO DSCNMKR DOCD: CPT | Performed by: INTERNAL MEDICINE

## 2025-07-16 PROCEDURE — 99214 OFFICE O/P EST MOD 30 MIN: CPT | Performed by: INTERNAL MEDICINE

## 2025-07-16 PROCEDURE — G2211 COMPLEX E/M VISIT ADD ON: HCPCS | Performed by: INTERNAL MEDICINE

## 2025-07-16 PROCEDURE — G8427 DOCREV CUR MEDS BY ELIG CLIN: HCPCS | Performed by: INTERNAL MEDICINE

## 2025-07-16 PROCEDURE — 3074F SYST BP LT 130 MM HG: CPT | Performed by: INTERNAL MEDICINE

## 2025-07-16 RX ORDER — DIPHENOXYLATE HYDROCHLORIDE AND ATROPINE SULFATE 2.5; .025 MG/1; MG/1
2 TABLET ORAL 2 TIMES DAILY
Qty: 60 TABLET | Refills: 3 | Status: SHIPPED | OUTPATIENT
Start: 2025-07-16 | End: 2025-09-14

## 2025-07-16 NOTE — PROGRESS NOTES
[facility]             MD Amelia Henry, MD Sowmya Obregon, APRN-CNP       Radha Chau, APRN-CNP             [address] [place] [address]             [phone] fax [phone]        Gastroenterology Clinic Consultation    Prabhakar Petersen MD  Encounter Date: 07/16/25     CC: Follow-up (Pt is here for a 6 mo f/u to discuss med refills, fatigue in afternoon pt mentions not having energy most of the day. )       No referring provider defined for this encounter.     History obtained from: patient, family, medical records     Subjective:       Tuan Lea is an 74 y.o. male with past medical history of Crohn's disease with extensive ileocolonic resection and known ileocolonic stricture, SBO, choledocholithiasis status post ERCP.  Who presents for Follow-up (Pt is here for a 6 mo f/u to discuss med refills, fatigue in afternoon pt mentions not having energy most of the day. )  7/16/2025  Since his last visit the patient had labs done which showed CRP less than 3.  Calprotectin elevated at 165 however down from previous levels of 310.   Vitamin D levels normal  He is also immune to hepatitis A    Patient is having 2-4 loose soft bowel movements with no blood in them.  He mention he has been taking his Lomotil he has been compliant with his Entyvio.    1/15/2025  Patient has longstanding Crohn's disease with history of ileocolonic involvement and extensive ileocolonic resection with subtotal colectomy and ileocolonic anastomosis stricture. He was initially diagnosed 1973.  Patient has been followed by Dr. Chaves for long time and is currently on Entyvio infusion.  His last flexible sigmoidoscopy was done by him on 08/28/2024 showing subtotal colectomy with end-to-side ileosigmoid anastomosis.  The anastomosis permitted the passage of pediatric colonoscopy into the neoterminal ileum and the scope was advanced

## 2025-07-29 ENCOUNTER — HOSPITAL ENCOUNTER (OUTPATIENT)
Dept: INFUSION THERAPY | Age: 75
Setting detail: INFUSION SERIES
Discharge: HOME | End: 2025-07-29
Payer: MEDICARE

## 2025-07-29 VITALS
SYSTOLIC BLOOD PRESSURE: 124 MMHG | HEART RATE: 67 BPM | DIASTOLIC BLOOD PRESSURE: 69 MMHG | OXYGEN SATURATION: 99 % | RESPIRATION RATE: 16 BRPM | TEMPERATURE: 97.2 F

## 2025-07-29 DIAGNOSIS — K50.814 CROHN'S DISEASE OF BOTH SMALL AND LARGE INTESTINE WITH ABSCESS (HCC): Primary | ICD-10-CM

## 2025-07-29 PROCEDURE — 96365 THER/PROPH/DIAG IV INF INIT: CPT

## 2025-07-29 PROCEDURE — 2580000003 HC RX 258: Performed by: INTERNAL MEDICINE

## 2025-07-29 PROCEDURE — 6360000002 HC RX W HCPCS: Performed by: INTERNAL MEDICINE

## 2025-07-29 PROCEDURE — 2500000003 HC RX 250 WO HCPCS: Performed by: INTERNAL MEDICINE

## 2025-07-29 RX ORDER — SODIUM CHLORIDE 0.9 % (FLUSH) 0.9 %
5-40 SYRINGE (ML) INJECTION PRN
OUTPATIENT
Start: 2025-09-23

## 2025-07-29 RX ORDER — SODIUM CHLORIDE 0.9 % (FLUSH) 0.9 %
5-40 SYRINGE (ML) INJECTION PRN
Status: DISCONTINUED | OUTPATIENT
Start: 2025-07-29 | End: 2025-07-30 | Stop reason: HOSPADM

## 2025-07-29 RX ADMIN — SODIUM CHLORIDE, PRESERVATIVE FREE 10 ML: 5 INJECTION INTRAVENOUS at 09:44

## 2025-07-29 RX ADMIN — SODIUM CHLORIDE, PRESERVATIVE FREE 30 ML: 5 INJECTION INTRAVENOUS at 10:17

## 2025-07-29 RX ADMIN — VEDOLIZUMAB 300 MG: 300 INJECTION, POWDER, LYOPHILIZED, FOR SOLUTION INTRAVENOUS at 09:45

## 2025-07-29 NOTE — PROGRESS NOTES
Prior to discharge, the After Visit Summary Discharge Instructions were reviewed with the patient.  He was offered a printed version of the AVS, but declined the offer. Recurrent appointment, pt tolerated infusion well. Pt discharged home. Pt to exit via steady gait.    Orders Placed This Encounter   Medications    vedolizumab (ENTYVIO) 300 mg in sodium chloride 0.9 % 250 mL infusion    sodium chloride flush 0.9 % injection 5-40 mL

## (undated) DEVICE — TOWEL,OR,DSP,ST,BLUE,STD,6/PK,12PK/CS: Brand: MEDLINE

## (undated) DEVICE — LINER,SEMI-RIGID,3000CC,50EA/CS: Brand: MEDLINE

## (undated) DEVICE — Device

## (undated) DEVICE — ELECTRODE ES AD CRDLSS PT RET REM POLYHESIVE

## (undated) DEVICE — PROTECTOR EYE PT SELF ADH NS OPT GRD LF

## (undated) DEVICE — BANDAGE,SELF ADHRNT,COFLEX,4"X5YD,STRL: Brand: COLABEL

## (undated) DEVICE — PENCIL ES CRD L10FT HND SWCHING ROCK SWCH W/ EDGE COAT BLDE

## (undated) DEVICE — 3M™ IOBAN™ 2 ANTIMICROBIAL INCISE DRAPE 6650EZ: Brand: IOBAN™ 2

## (undated) DEVICE — FORCEPS BX L240CM JAW DIA2.8MM L CAP W/ NDL MIC MESH TOOTH

## (undated) DEVICE — DRESSING PETRO W3XL3IN OIL EMUL N ADH GZ KNIT IMPREG CELOS

## (undated) DEVICE — SYRINGE IRRIG 60ML SFT PLIABLE BLB EZ TO GRP 1 HND USE W/

## (undated) DEVICE — SPONGE LAP W18XL18IN WHT COT 4 PLY FLD STRUNG RADPQ DISP ST 2 PER PACK

## (undated) DEVICE — MARKER SURG SKIN UTIL REGULAR/FINE 2 TIP W/ RUL AND 9 LBL

## (undated) DEVICE — GLOVE ORANGE PI 7 1/2   MSG9075

## (undated) DEVICE — ENDOSCOPY KIT: Brand: MEDLINE INDUSTRIES, INC.

## (undated) DEVICE — YANKAUER,FLEXIBLE HANDLE,REGLR CAPACITY: Brand: MEDLINE INDUSTRIES, INC.

## (undated) DEVICE — SPONGE GZ W4XL8IN COT WVN 12 PLY

## (undated) DEVICE — DRESSING TRNSPAR W2XL2.75IN FLM SHT SEMIPERMEABLE WIND

## (undated) DEVICE — Z DISCONTINUED PER MEDLINE USE 2741943 DRESSING AQUACEL 10 IN ALG W9XL25CM SIL CVR WTRPRF VIR BACT BARR ANTIMIC

## (undated) DEVICE — SYRINGE INFL 60ML DISP ALLIANCE II

## (undated) DEVICE — SYRINGE MED 30ML STD CLR PLAS LUERLOCK TIP N CTRL DISP

## (undated) DEVICE — GLOVE ORANGE PI 7   MSG9070

## (undated) DEVICE — SHOULDER PK

## (undated) DEVICE — APPLICATOR MEDICATED 26 CC SOLUTION HI LT ORNG CHLORAPREP

## (undated) DEVICE — COVER,C-ARM,41X74: Brand: MEDLINE

## (undated) DEVICE — Z DISCONTINUED USE 2220295 SUTURE VICRYL SZ 0 L18IN ABSRB UD L36MM CT-1 1/2 CIR J840D

## (undated) DEVICE — DRAPE SHEET ULTRAGARD: Brand: MEDLINE

## (undated) DEVICE — SUTURE VICRYL SZ 2-0 L18IN ABSRB UD CT-1 L36MM 1/2 CIR J839D

## (undated) DEVICE — Z INACTIVE NO ACTIVE SUPPLIER APPLICATOR MEDICATED 26 CC TINT HI-LITE ORNG STRL CHLORAPREP

## (undated) DEVICE — BLADE SCALPEL STERILE NO 10

## (undated) DEVICE — SPHINCTEROTOME: Brand: HYDRATOME RX 44

## (undated) DEVICE — PAD,ABDOMINAL,5"X9",ST,LF,25/BX: Brand: MEDLINE INDUSTRIES, INC.

## (undated) DEVICE — CIRCUIT BREATHING SINGLE LIMB AD 72 IN 3 LT 2 FILTER LIMBO

## (undated) DEVICE — ESOPHAGEAL/PYLORIC/COLONIC/BILIARY WIREGUIDED BALLOON DILATATION CATHETER: Brand: CRE™ PRO

## (undated) DEVICE — SOLUTION IV IRRIG WATER 1000ML POUR BRL 2F7114

## (undated) DEVICE — Z DISCONTINUED NO SUB IDED BOTTLE SOLUTION 8 OZ BDINE

## (undated) DEVICE — INFLATION DEVICE: Brand: ENCORE 26

## (undated) DEVICE — SINGLE USE DISTAL COVER MAJ-2315: Brand: SINGLE USE DISTAL COVER

## (undated) DEVICE — RETRIEVAL BALLOON CATHETER: Brand: EXTRACTOR™ PRO RX

## (undated) DEVICE — DRAPE,EXTREMITY,89X128,STERILE: Brand: MEDLINE

## (undated) DEVICE — SUTURE FIBERWIRE SZ 5 L38IN NONABSORBABLE BLU L48MM 1/2 AR7211

## (undated) DEVICE — COUNTER NDL 30 COUNT FOAM STRP SGL MAG

## (undated) DEVICE — GLOVE SURG SZ 8 L12IN THK75MIL DK GRN LTX FREE

## (undated) DEVICE — 3M™ STERI-DRAPE™ INSTRUMENT POUCH 1018: Brand: STERI-DRAPE™

## (undated) DEVICE — TUBING, SUCTION, 9/32" X 10', STRAIGHT: Brand: MEDLINE